# Patient Record
Sex: MALE | Race: WHITE
[De-identification: names, ages, dates, MRNs, and addresses within clinical notes are randomized per-mention and may not be internally consistent; named-entity substitution may affect disease eponyms.]

---

## 2019-03-12 ENCOUNTER — HOSPITAL ENCOUNTER (OUTPATIENT)
Age: 44
End: 2019-03-12
Payer: MEDICARE

## 2019-03-12 DIAGNOSIS — K74.60: ICD-10-CM

## 2019-03-12 DIAGNOSIS — R76.8: ICD-10-CM

## 2019-03-12 DIAGNOSIS — I10: Primary | ICD-10-CM

## 2019-03-12 DIAGNOSIS — E55.9: ICD-10-CM

## 2019-03-12 DIAGNOSIS — R53.83: ICD-10-CM

## 2019-03-12 DIAGNOSIS — R94.5: ICD-10-CM

## 2019-03-12 LAB
ALBUMIN LEVEL: 3.3 GM/DL (ref 3.4–5)
ALBUMIN/GLOB SERPL: 0.9 {RATIO} (ref 1.1–1.8)
ALP ISO SERPL-ACNC: 172 U/L (ref 46–116)
ALT SERPLBLD-CCNC: 36 U/L (ref 12–78)
ANION GAP SERPL CALC-SCNC: 14.1 MEQ/L (ref 5–15)
AST SERPL QL: 54 U/L (ref 15–37)
BILIRUBIN,TOTAL: 2 MG/DL (ref 0.2–1)
BUN SERPL-MCNC: 9 MG/DL (ref 7–18)
CALCIUM SPEC-MCNC: 8.5 MG/DL (ref 8.5–10.1)
CHLORIDE SPEC-SCNC: 104 MMOL/L (ref 98–107)
CHOLEST SPEC-SCNC: 131 MG/DL (ref 140–200)
CO2 SERPL-SCNC: 25 MMOL/L (ref 21–32)
CREAT BLD-SCNC: 0.67 MG/DL (ref 0.7–1.3)
ESTIMATED GLOMERULAR FILT RATE: 128 ML/MIN (ref 60–?)
GFR (AFRICAN AMERICAN): 155 ML/MIN (ref 60–?)
GLOBULIN SER CALC-MCNC: 3.7 GM/DL (ref 1.3–3.2)
GLUCOSE: 126 MG/DL (ref 74–106)
HCT VFR BLD CALC: 37.8 % (ref 42–52)
HDLC SERPL-MCNC: 89 MG/DL (ref 27–67)
HGB BLD-MCNC: 13.6 G/DL (ref 14.1–18)
MCHC RBC-ENTMCNC: 36.1 G/DL (ref 31.8–35.4)
MCV RBC: 94 FL (ref 80–94)
MEAN CORPUSCULAR HEMOGLOBIN: 33.9 PG (ref 27–31.2)
PLATELET # BLD: 66 K/MM3 (ref 142–424)
POTASSIUM: 3.1 MMOL/L (ref 3.5–5.1)
PROT SERPL-MCNC: 7 GM/DL (ref 6.4–8.2)
RBC # BLD AUTO: 4.02 M/MM3 (ref 4.6–6.2)
SODIUM SPEC-SCNC: 140 MMOL/L (ref 136–145)
T4 (THYROXINE): 6.6 UG/DL (ref 4.7–13.3)
T4 FREE SERPL-MCNC: 0.9 NG/DL (ref 0.76–1.46)
TRIGLYCERIDES: 26 MG/DL (ref 30–200)
TSH SERPL-ACNC: 7.12 UIU/ML (ref 0.36–3.74)
WBC # BLD AUTO: 3.4 K/MM3 (ref 4.8–10.8)

## 2019-03-12 PROCEDURE — 84439 ASSAY OF FREE THYROXINE: CPT

## 2019-03-12 PROCEDURE — 85025 COMPLETE CBC W/AUTO DIFF WBC: CPT

## 2019-03-12 PROCEDURE — 84443 ASSAY THYROID STIM HORMONE: CPT

## 2019-03-12 PROCEDURE — 82652 VIT D 1 25-DIHYDROXY: CPT

## 2019-03-12 PROCEDURE — 80053 COMPREHEN METABOLIC PANEL: CPT

## 2019-03-12 PROCEDURE — 80074 ACUTE HEPATITIS PANEL: CPT

## 2019-03-12 PROCEDURE — 84436 ASSAY OF TOTAL THYROXINE: CPT

## 2019-03-12 PROCEDURE — 87522 HEPATITIS C REVRS TRNSCRPJ: CPT

## 2019-03-12 PROCEDURE — 80061 LIPID PANEL: CPT

## 2019-03-13 LAB — HCV AB SER IA-ACNC: 5.2 S/CO RATIO (ref 0–0.9)

## 2019-03-18 LAB
1,25-DIHYDROXY, VITAMIN D-2: <10 PG/ML
1,25-DIHYDROXY, VITAMIN D-3: 37 PG/ML
VIT D25+D1,25 OH+D1,25 PNL SERPL-MCNC: 37 PG/ML

## 2020-06-05 ENCOUNTER — HOSPITAL ENCOUNTER (INPATIENT)
Dept: HOSPITAL 22 - ER | Age: 45
LOS: 4 days | Discharge: HOME | DRG: 603 | End: 2020-06-09
Payer: MEDICARE

## 2020-06-05 VITALS
BODY MASS INDEX: 39.4 KG/M2 | BODY MASS INDEX: 39.9 KG/M2 | BODY MASS INDEX: 40.1 KG/M2 | BODY MASS INDEX: 40 KG/M2 | BODY MASS INDEX: 41.3 KG/M2

## 2020-06-05 VITALS
DIASTOLIC BLOOD PRESSURE: 104 MMHG | OXYGEN SATURATION: 99 % | RESPIRATION RATE: 20 BRPM | HEART RATE: 93 BPM | TEMPERATURE: 98.24 F | SYSTOLIC BLOOD PRESSURE: 163 MMHG

## 2020-06-05 VITALS
RESPIRATION RATE: 18 BRPM | SYSTOLIC BLOOD PRESSURE: 144 MMHG | OXYGEN SATURATION: 98 % | HEART RATE: 100 BPM | DIASTOLIC BLOOD PRESSURE: 77 MMHG | TEMPERATURE: 100 F

## 2020-06-05 VITALS — DIASTOLIC BLOOD PRESSURE: 88 MMHG | SYSTOLIC BLOOD PRESSURE: 144 MMHG | HEART RATE: 88 BPM

## 2020-06-05 VITALS
SYSTOLIC BLOOD PRESSURE: 137 MMHG | OXYGEN SATURATION: 98 % | DIASTOLIC BLOOD PRESSURE: 64 MMHG | RESPIRATION RATE: 20 BRPM | HEART RATE: 79 BPM | TEMPERATURE: 99.14 F

## 2020-06-05 VITALS
HEART RATE: 85 BPM | DIASTOLIC BLOOD PRESSURE: 86 MMHG | OXYGEN SATURATION: 97 % | RESPIRATION RATE: 20 BRPM | SYSTOLIC BLOOD PRESSURE: 152 MMHG

## 2020-06-05 VITALS
RESPIRATION RATE: 16 BRPM | TEMPERATURE: 97.88 F | SYSTOLIC BLOOD PRESSURE: 155 MMHG | HEART RATE: 78 BPM | DIASTOLIC BLOOD PRESSURE: 74 MMHG | OXYGEN SATURATION: 98 %

## 2020-06-05 VITALS — HEART RATE: 89 BPM | SYSTOLIC BLOOD PRESSURE: 130 MMHG | DIASTOLIC BLOOD PRESSURE: 80 MMHG

## 2020-06-05 DIAGNOSIS — Z72.0: ICD-10-CM

## 2020-06-05 DIAGNOSIS — L03.115: Primary | ICD-10-CM

## 2020-06-05 DIAGNOSIS — E87.1: ICD-10-CM

## 2020-06-05 DIAGNOSIS — I10: ICD-10-CM

## 2020-06-05 DIAGNOSIS — Z88.8: ICD-10-CM

## 2020-06-05 DIAGNOSIS — D61.818: ICD-10-CM

## 2020-06-05 LAB
ALBUMIN LEVEL: 3.1 G/DL (ref 3.5–5)
ALBUMIN/GLOB SERPL: 0.6 {RATIO} (ref 1.1–1.8)
ALP ISO SERPL-ACNC: 225 U/L (ref 38–126)
ALT SERPLBLD-CCNC: 47 U/L (ref 12–78)
ANION GAP SERPL CALC-SCNC: -13.4 MEQ/L (ref 5–15)
AST SERPL QL: 103 U/L (ref 17–59)
BILIRUBIN,TOTAL: 2.6 MG/DL (ref 0.2–1.3)
BUN SERPL-MCNC: 7 MG/DL (ref 9–20)
CALCIUM SPEC-MCNC: 8 MG/DL (ref 8.4–10.2)
CHLORIDE SPEC-SCNC: 108 MMOL/L (ref 98–107)
CO2 SERPL-SCNC: 27 MMOL/L (ref 22–30)
CREAT BLD-SCNC: 0.6 MG/DL (ref 0.66–1.25)
CREATININE CLEARANCE ESTIMATED: 154 ML/MIN (ref 50–200)
CRP SERPL HS-MCNC: 40.9 MG/L (ref 0–4)
ESTIMATED GLOMERULAR FILT RATE: 145 ML/MIN (ref 60–?)
GFR (AFRICAN AMERICAN): 176 ML/MIN (ref 60–?)
GLOBULIN SER CALC-MCNC: 5.2 G/DL (ref 1.3–3.2)
GLUCOSE: 92 MG/DL (ref 74–100)
HCT VFR BLD CALC: 34.3 % (ref 42–52)
HGB BLD-MCNC: 11.2 G/DL (ref 14.1–18)
MCHC RBC-ENTMCNC: 32.6 G/DL (ref 31.8–35.4)
MCV RBC: 101.7 FL (ref 80–94)
MEAN CORPUSCULAR HEMOGLOBIN: 33.1 PG (ref 27–31.2)
PLATELET # BLD: 87 K/MM3 (ref 142–424)
POTASSIUM: 3.6 MMOL/L (ref 3.5–5.1)
PROT SERPL-MCNC: 8.3 G/DL (ref 6.3–8.2)
RBC # BLD AUTO: 3.37 M/MM3 (ref 4.6–6.2)
SODIUM SPEC-SCNC: 118 MMOL/L (ref 136–145)
WBC # BLD AUTO: 2.9 K/MM3 (ref 4.8–10.8)

## 2020-06-05 PROCEDURE — 83605 ASSAY OF LACTIC ACID: CPT

## 2020-06-05 PROCEDURE — 80202 ASSAY OF VANCOMYCIN: CPT

## 2020-06-05 PROCEDURE — 80076 HEPATIC FUNCTION PANEL: CPT

## 2020-06-05 PROCEDURE — 96367 TX/PROPH/DG ADDL SEQ IV INF: CPT

## 2020-06-05 PROCEDURE — 80048 BASIC METABOLIC PNL TOTAL CA: CPT

## 2020-06-05 PROCEDURE — 96375 TX/PRO/DX INJ NEW DRUG ADDON: CPT

## 2020-06-05 PROCEDURE — 99284 EMERGENCY DEPT VISIT MOD MDM: CPT

## 2020-06-05 PROCEDURE — 73590 X-RAY EXAM OF LOWER LEG: CPT

## 2020-06-05 PROCEDURE — 87040 BLOOD CULTURE FOR BACTERIA: CPT

## 2020-06-05 PROCEDURE — 96365 THER/PROPH/DIAG IV INF INIT: CPT

## 2020-06-05 PROCEDURE — 73701 CT LOWER EXTREMITY W/DYE: CPT

## 2020-06-05 PROCEDURE — 86140 C-REACTIVE PROTEIN: CPT

## 2020-06-05 PROCEDURE — 82962 GLUCOSE BLOOD TEST: CPT

## 2020-06-05 PROCEDURE — 85025 COMPLETE CBC W/AUTO DIFF WBC: CPT

## 2020-06-05 PROCEDURE — 80053 COMPREHEN METABOLIC PANEL: CPT

## 2020-06-05 PROCEDURE — 36415 COLL VENOUS BLD VENIPUNCTURE: CPT

## 2020-06-05 PROCEDURE — 93970 EXTREMITY STUDY: CPT

## 2020-06-06 VITALS
HEART RATE: 78 BPM | SYSTOLIC BLOOD PRESSURE: 146 MMHG | RESPIRATION RATE: 18 BRPM | DIASTOLIC BLOOD PRESSURE: 74 MMHG | TEMPERATURE: 98.3 F | OXYGEN SATURATION: 97 %

## 2020-06-06 VITALS
HEART RATE: 83 BPM | DIASTOLIC BLOOD PRESSURE: 84 MMHG | SYSTOLIC BLOOD PRESSURE: 155 MMHG | OXYGEN SATURATION: 95 % | TEMPERATURE: 98.42 F | RESPIRATION RATE: 20 BRPM

## 2020-06-06 VITALS
HEART RATE: 76 BPM | RESPIRATION RATE: 20 BRPM | OXYGEN SATURATION: 99 % | DIASTOLIC BLOOD PRESSURE: 79 MMHG | TEMPERATURE: 97.88 F | SYSTOLIC BLOOD PRESSURE: 112 MMHG

## 2020-06-06 VITALS
TEMPERATURE: 98.7 F | DIASTOLIC BLOOD PRESSURE: 73 MMHG | OXYGEN SATURATION: 96 % | SYSTOLIC BLOOD PRESSURE: 140 MMHG | RESPIRATION RATE: 20 BRPM | HEART RATE: 78 BPM

## 2020-06-06 VITALS — OXYGEN SATURATION: 99 %

## 2020-06-06 LAB
ANION GAP SERPL CALC-SCNC: 5.1 MEQ/L (ref 5–15)
BUN SERPL-MCNC: 8 MG/DL (ref 9–20)
CALCIUM SPEC-MCNC: 7.8 MG/DL (ref 8.4–10.2)
CHLORIDE SPEC-SCNC: 106 MMOL/L (ref 98–107)
CO2 SERPL-SCNC: 29 MMOL/L (ref 22–30)
CREAT BLD-SCNC: 0.7 MG/DL (ref 0.66–1.25)
CREATININE CLEARANCE ESTIMATED: 128 ML/MIN (ref 50–200)
ESTIMATED GLOMERULAR FILT RATE: 121 ML/MIN (ref 60–?)
GFR (AFRICAN AMERICAN): 147 ML/MIN (ref 60–?)
GLUCOSE: 150 MG/DL (ref 74–100)
HCT VFR BLD CALC: 31.9 % (ref 42–52)
HGB BLD-MCNC: 10.4 G/DL (ref 14.1–18)
MCHC RBC-ENTMCNC: 32.5 G/DL (ref 31.8–35.4)
MCV RBC: 104.7 FL (ref 80–94)
MEAN CORPUSCULAR HEMOGLOBIN: 34.1 PG (ref 27–31.2)
PLATELET # BLD: 93 K/MM3 (ref 142–424)
POTASSIUM: 4.1 MMOL/L (ref 3.5–5.1)
RBC # BLD AUTO: 3.05 M/MM3 (ref 4.6–6.2)
SODIUM SPEC-SCNC: 136 MMOL/L (ref 136–145)
VANCOMYCIN TROUGH SERPL-MCNC: 12.9 UG/ML (ref 5–10)
WBC # BLD AUTO: 2.4 K/MM3 (ref 4.8–10.8)

## 2020-06-07 VITALS
HEART RATE: 81 BPM | TEMPERATURE: 98.42 F | OXYGEN SATURATION: 95 % | DIASTOLIC BLOOD PRESSURE: 58 MMHG | SYSTOLIC BLOOD PRESSURE: 133 MMHG | RESPIRATION RATE: 17 BRPM

## 2020-06-07 VITALS
TEMPERATURE: 98.1 F | HEART RATE: 71 BPM | DIASTOLIC BLOOD PRESSURE: 62 MMHG | RESPIRATION RATE: 17 BRPM | SYSTOLIC BLOOD PRESSURE: 130 MMHG | OXYGEN SATURATION: 99 %

## 2020-06-07 VITALS
HEART RATE: 82 BPM | TEMPERATURE: 98 F | SYSTOLIC BLOOD PRESSURE: 163 MMHG | RESPIRATION RATE: 18 BRPM | OXYGEN SATURATION: 96 % | DIASTOLIC BLOOD PRESSURE: 85 MMHG

## 2020-06-07 VITALS
TEMPERATURE: 98.42 F | OXYGEN SATURATION: 99 % | DIASTOLIC BLOOD PRESSURE: 76 MMHG | HEART RATE: 78 BPM | SYSTOLIC BLOOD PRESSURE: 161 MMHG | RESPIRATION RATE: 17 BRPM

## 2020-06-07 VITALS — OXYGEN SATURATION: 95 %

## 2020-06-07 LAB
ALBUMIN LEVEL: 2.5 G/DL (ref 3.5–5)
ALP ISO SERPL-ACNC: 208 U/L (ref 38–126)
ALT SERPLBLD-CCNC: 35 U/L (ref 12–78)
AST SERPL QL: 75 U/L (ref 17–59)
BILIRUB DIRECT SERPL-MCNC: 0.5 MG/DL (ref 0–0.4)
BILIRUB INDIRECT SERPL-MCNC: 0.7 MG/DL (ref 0–0.9)
BILIRUB INDIRECT SERPL-MCNC: 0.7 MG/DL (ref 0–1.1)
BILIRUBIN,TOTAL: 1.2 MG/DL (ref 0.2–1.3)
HCT VFR BLD CALC: 31.6 % (ref 42–52)
HGB BLD-MCNC: 10.5 G/DL (ref 14.1–18)
MCHC RBC-ENTMCNC: 33.3 G/DL (ref 31.8–35.4)
MCV RBC: 102 FL (ref 80–94)
MEAN CORPUSCULAR HEMOGLOBIN: 34 PG (ref 27–31.2)
PLATELET # BLD: 98 K/MM3 (ref 142–424)
PROT SERPL-MCNC: 7.1 G/DL (ref 6.3–8.2)
RBC # BLD AUTO: 3.1 M/MM3 (ref 4.6–6.2)
WBC # BLD AUTO: 2.2 K/MM3 (ref 4.8–10.8)

## 2020-06-08 VITALS
TEMPERATURE: 98.3 F | OXYGEN SATURATION: 95 % | RESPIRATION RATE: 18 BRPM | SYSTOLIC BLOOD PRESSURE: 157 MMHG | HEART RATE: 81 BPM | DIASTOLIC BLOOD PRESSURE: 92 MMHG

## 2020-06-08 VITALS
TEMPERATURE: 98.42 F | HEART RATE: 78 BPM | RESPIRATION RATE: 14 BRPM | OXYGEN SATURATION: 96 % | SYSTOLIC BLOOD PRESSURE: 143 MMHG | DIASTOLIC BLOOD PRESSURE: 74 MMHG

## 2020-06-08 VITALS
TEMPERATURE: 97.8 F | HEART RATE: 103 BPM | OXYGEN SATURATION: 95 % | SYSTOLIC BLOOD PRESSURE: 153 MMHG | RESPIRATION RATE: 18 BRPM | DIASTOLIC BLOOD PRESSURE: 83 MMHG

## 2020-06-08 VITALS
OXYGEN SATURATION: 97 % | SYSTOLIC BLOOD PRESSURE: 163 MMHG | HEART RATE: 87 BPM | TEMPERATURE: 98.1 F | RESPIRATION RATE: 18 BRPM | DIASTOLIC BLOOD PRESSURE: 96 MMHG

## 2020-06-08 LAB
ANION GAP SERPL CALC-SCNC: 8.6 MEQ/L (ref 5–15)
BUN SERPL-MCNC: 7 MG/DL (ref 9–20)
CALCIUM SPEC-MCNC: 8.4 MG/DL (ref 8.4–10.2)
CHLORIDE SPEC-SCNC: 98 MMOL/L (ref 98–107)
CO2 SERPL-SCNC: 33 MMOL/L (ref 22–30)
CREAT BLD-SCNC: 0.6 MG/DL (ref 0.66–1.25)
CREATININE CLEARANCE ESTIMATED: 263 ML/MIN (ref 50–200)
ESTIMATED GLOMERULAR FILT RATE: 145 ML/MIN (ref 60–?)
GFR (AFRICAN AMERICAN): 176 ML/MIN (ref 60–?)
GLUCOSE BLDC GLUCOMTR-MCNC: 101 MG/DL (ref 70–110)
GLUCOSE: 190 MG/DL (ref 74–100)
HCT VFR BLD CALC: 32.7 % (ref 42–52)
HGB BLD-MCNC: 11.1 G/DL (ref 14.1–18)
MCHC RBC-ENTMCNC: 34 G/DL (ref 31.8–35.4)
MCV RBC: 101.7 FL (ref 80–94)
MEAN CORPUSCULAR HEMOGLOBIN: 34.6 PG (ref 27–31.2)
PLATELET # BLD: 124 K/MM3 (ref 142–424)
POTASSIUM: 3.6 MMOL/L (ref 3.5–5.1)
RBC # BLD AUTO: 3.22 M/MM3 (ref 4.6–6.2)
SODIUM SPEC-SCNC: 136 MMOL/L (ref 136–145)
WBC # BLD AUTO: 2.6 K/MM3 (ref 4.8–10.8)

## 2020-06-09 VITALS
SYSTOLIC BLOOD PRESSURE: 168 MMHG | DIASTOLIC BLOOD PRESSURE: 87 MMHG | OXYGEN SATURATION: 99 % | RESPIRATION RATE: 16 BRPM | HEART RATE: 100 BPM | TEMPERATURE: 98.06 F

## 2020-06-09 VITALS
RESPIRATION RATE: 19 BRPM | OXYGEN SATURATION: 96 % | SYSTOLIC BLOOD PRESSURE: 150 MMHG | TEMPERATURE: 97.8 F | DIASTOLIC BLOOD PRESSURE: 82 MMHG | HEART RATE: 98 BPM

## 2020-06-09 VITALS — RESPIRATION RATE: 18 BRPM

## 2020-06-09 VITALS — RESPIRATION RATE: 20 BRPM

## 2020-06-09 LAB
ANION GAP SERPL CALC-SCNC: 2.6 MEQ/L (ref 5–15)
BUN SERPL-MCNC: 8 MG/DL (ref 9–20)
CALCIUM SPEC-MCNC: 7.9 MG/DL (ref 8.4–10.2)
CHLORIDE SPEC-SCNC: 104 MMOL/L (ref 98–107)
CO2 SERPL-SCNC: 32 MMOL/L (ref 22–30)
CREAT BLD-SCNC: 0.6 MG/DL (ref 0.66–1.25)
CREATININE CLEARANCE ESTIMATED: 263 ML/MIN (ref 50–200)
ESTIMATED GLOMERULAR FILT RATE: 145 ML/MIN (ref 60–?)
GFR (AFRICAN AMERICAN): 176 ML/MIN (ref 60–?)
GLUCOSE: 194 MG/DL (ref 74–100)
HCT VFR BLD CALC: 32.6 % (ref 42–52)
HGB BLD-MCNC: 10.8 G/DL (ref 14.1–18)
MCHC RBC-ENTMCNC: 33.1 G/DL (ref 31.8–35.4)
MCV RBC: 101.7 FL (ref 80–94)
MEAN CORPUSCULAR HEMOGLOBIN: 33.6 PG (ref 27–31.2)
PLATELET # BLD: 109 K/MM3 (ref 142–424)
POTASSIUM: 3.6 MMOL/L (ref 3.5–5.1)
RBC # BLD AUTO: 3.2 M/MM3 (ref 4.6–6.2)
SODIUM SPEC-SCNC: 135 MMOL/L (ref 136–145)
WBC # BLD AUTO: 2.5 K/MM3 (ref 4.8–10.8)

## 2020-07-22 ENCOUNTER — HOSPITAL ENCOUNTER (INPATIENT)
Dept: HOSPITAL 22 - ER | Age: 45
LOS: 3 days | Discharge: HOME | DRG: 571 | End: 2020-07-25
Payer: MEDICARE

## 2020-07-22 VITALS
HEART RATE: 77 BPM | DIASTOLIC BLOOD PRESSURE: 61 MMHG | RESPIRATION RATE: 18 BRPM | OXYGEN SATURATION: 100 % | SYSTOLIC BLOOD PRESSURE: 136 MMHG

## 2020-07-22 VITALS
RESPIRATION RATE: 18 BRPM | SYSTOLIC BLOOD PRESSURE: 172 MMHG | OXYGEN SATURATION: 99 % | DIASTOLIC BLOOD PRESSURE: 90 MMHG | HEART RATE: 75 BPM

## 2020-07-22 VITALS
DIASTOLIC BLOOD PRESSURE: 64 MMHG | SYSTOLIC BLOOD PRESSURE: 123 MMHG | HEART RATE: 84 BPM | OXYGEN SATURATION: 99 % | RESPIRATION RATE: 16 BRPM

## 2020-07-22 VITALS — SYSTOLIC BLOOD PRESSURE: 133 MMHG | DIASTOLIC BLOOD PRESSURE: 59 MMHG | HEART RATE: 85 BPM | OXYGEN SATURATION: 98 %

## 2020-07-22 VITALS
RESPIRATION RATE: 16 BRPM | OXYGEN SATURATION: 98 % | DIASTOLIC BLOOD PRESSURE: 64 MMHG | HEART RATE: 84 BPM | SYSTOLIC BLOOD PRESSURE: 127 MMHG

## 2020-07-22 VITALS
OXYGEN SATURATION: 99 % | TEMPERATURE: 98.78 F | SYSTOLIC BLOOD PRESSURE: 159 MMHG | HEART RATE: 87 BPM | RESPIRATION RATE: 18 BRPM | DIASTOLIC BLOOD PRESSURE: 87 MMHG

## 2020-07-22 VITALS
HEART RATE: 84 BPM | DIASTOLIC BLOOD PRESSURE: 91 MMHG | RESPIRATION RATE: 18 BRPM | SYSTOLIC BLOOD PRESSURE: 131 MMHG | OXYGEN SATURATION: 98 %

## 2020-07-22 VITALS
RESPIRATION RATE: 20 BRPM | OXYGEN SATURATION: 100 % | SYSTOLIC BLOOD PRESSURE: 171 MMHG | TEMPERATURE: 98.06 F | HEART RATE: 79 BPM | DIASTOLIC BLOOD PRESSURE: 89 MMHG

## 2020-07-22 VITALS
DIASTOLIC BLOOD PRESSURE: 81 MMHG | OXYGEN SATURATION: 100 % | RESPIRATION RATE: 16 BRPM | SYSTOLIC BLOOD PRESSURE: 137 MMHG | TEMPERATURE: 98.7 F | HEART RATE: 72 BPM

## 2020-07-22 VITALS
BODY MASS INDEX: 40.1 KG/M2 | BODY MASS INDEX: 41.8 KG/M2 | BODY MASS INDEX: 40.7 KG/M2 | BODY MASS INDEX: 40.8 KG/M2 | BODY MASS INDEX: 38.4 KG/M2

## 2020-07-22 DIAGNOSIS — L03.115: Primary | ICD-10-CM

## 2020-07-22 DIAGNOSIS — Z72.0: ICD-10-CM

## 2020-07-22 DIAGNOSIS — Z88.8: ICD-10-CM

## 2020-07-22 DIAGNOSIS — L02.611: ICD-10-CM

## 2020-07-22 DIAGNOSIS — L03.116: ICD-10-CM

## 2020-07-22 DIAGNOSIS — R65.10: ICD-10-CM

## 2020-07-22 DIAGNOSIS — Z16.24: ICD-10-CM

## 2020-07-22 DIAGNOSIS — E66.01: ICD-10-CM

## 2020-07-22 DIAGNOSIS — I89.0: ICD-10-CM

## 2020-07-22 DIAGNOSIS — L60.3: ICD-10-CM

## 2020-07-22 DIAGNOSIS — L97.511: ICD-10-CM

## 2020-07-22 LAB
ALBUMIN LEVEL: 3.1 G/DL (ref 3.5–5)
ALBUMIN/GLOB SERPL: 0.7 {RATIO} (ref 1.1–1.8)
ALP ISO SERPL-ACNC: 186 U/L (ref 38–126)
ALT SERPLBLD-CCNC: 43 U/L (ref 12–78)
ANION GAP SERPL CALC-SCNC: 4.5 MEQ/L (ref 5–15)
AST SERPL QL: 75 U/L (ref 17–59)
BILIRUBIN,TOTAL: 2.7 MG/DL (ref 0.2–1.3)
BUN SERPL-MCNC: 9 MG/DL (ref 9–20)
CALCIUM SPEC-MCNC: 8.5 MG/DL (ref 8.4–10.2)
CHLORIDE SPEC-SCNC: 99 MMOL/L (ref 98–107)
CO2 SERPL-SCNC: 33 MMOL/L (ref 22–30)
CREAT BLD-SCNC: 0.5 MG/DL (ref 0.66–1.25)
CREATININE CLEARANCE ESTIMATED: 308 ML/MIN (ref 50–200)
CRP SERPL HS-MCNC: 53.6 MG/L (ref 0–4)
ESTIMATED GLOMERULAR FILT RATE: 179 ML/MIN (ref 60–?)
GFR (AFRICAN AMERICAN): 217 ML/MIN (ref 60–?)
GLOBULIN SER CALC-MCNC: 4.3 G/DL (ref 1.3–3.2)
GLUCOSE: 95 MG/DL (ref 74–100)
HCT VFR BLD CALC: 36.5 % (ref 42–52)
HGB BLD-MCNC: 12.3 G/DL (ref 14.1–18)
MCHC RBC-ENTMCNC: 33.8 G/DL (ref 31.8–35.4)
MCV RBC: 100.1 FL (ref 80–94)
MEAN CORPUSCULAR HEMOGLOBIN: 33.8 PG (ref 27–31.2)
PLATELET # BLD: 96 K/MM3 (ref 142–424)
POTASSIUM: 3.5 MMOL/L (ref 3.5–5.1)
PROT SERPL-MCNC: 7.4 G/DL (ref 6.3–8.2)
RBC # BLD AUTO: 3.64 M/MM3 (ref 4.6–6.2)
SODIUM SPEC-SCNC: 133 MMOL/L (ref 136–145)
WBC # BLD AUTO: 4.8 K/MM3 (ref 4.8–10.8)

## 2020-07-22 PROCEDURE — 80074 ACUTE HEPATITIS PANEL: CPT

## 2020-07-22 PROCEDURE — 87070 CULTURE OTHR SPECIMN AEROBIC: CPT

## 2020-07-22 PROCEDURE — 94640 AIRWAY INHALATION TREATMENT: CPT

## 2020-07-22 PROCEDURE — 97162 PT EVAL MOD COMPLEX 30 MIN: CPT

## 2020-07-22 PROCEDURE — 76705 ECHO EXAM OF ABDOMEN: CPT

## 2020-07-22 PROCEDURE — 87205 SMEAR GRAM STAIN: CPT

## 2020-07-22 PROCEDURE — 73590 X-RAY EXAM OF LOWER LEG: CPT

## 2020-07-22 PROCEDURE — 73630 X-RAY EXAM OF FOOT: CPT

## 2020-07-22 PROCEDURE — 93005 ELECTROCARDIOGRAM TRACING: CPT

## 2020-07-22 PROCEDURE — 86328 IA NFCT AB SARSCOV2 COVID19: CPT

## 2020-07-22 PROCEDURE — 87040 BLOOD CULTURE FOR BACTERIA: CPT

## 2020-07-22 PROCEDURE — 87186 SC STD MICRODIL/AGAR DIL: CPT

## 2020-07-22 PROCEDURE — 87077 CULTURE AEROBIC IDENTIFY: CPT

## 2020-07-22 PROCEDURE — 83605 ASSAY OF LACTIC ACID: CPT

## 2020-07-22 PROCEDURE — 85025 COMPLETE CBC W/AUTO DIFF WBC: CPT

## 2020-07-22 PROCEDURE — 96365 THER/PROPH/DIAG IV INF INIT: CPT

## 2020-07-22 PROCEDURE — 80076 HEPATIC FUNCTION PANEL: CPT

## 2020-07-22 PROCEDURE — 83036 HEMOGLOBIN GLYCOSYLATED A1C: CPT

## 2020-07-22 PROCEDURE — 80053 COMPREHEN METABOLIC PANEL: CPT

## 2020-07-22 PROCEDURE — 85651 RBC SED RATE NONAUTOMATED: CPT

## 2020-07-22 PROCEDURE — 94761 N-INVAS EAR/PLS OXIMETRY MLT: CPT

## 2020-07-22 PROCEDURE — 11042 DBRDMT SUBQ TIS 1ST 20SQCM/<: CPT

## 2020-07-22 PROCEDURE — 96367 TX/PROPH/DG ADDL SEQ IV INF: CPT

## 2020-07-22 PROCEDURE — 86140 C-REACTIVE PROTEIN: CPT

## 2020-07-22 PROCEDURE — 93306 TTE W/DOPPLER COMPLETE: CPT

## 2020-07-22 PROCEDURE — 71046 X-RAY EXAM CHEST 2 VIEWS: CPT

## 2020-07-22 PROCEDURE — 87075 CULTR BACTERIA EXCEPT BLOOD: CPT

## 2020-07-22 PROCEDURE — 36415 COLL VENOUS BLD VENIPUNCTURE: CPT

## 2020-07-22 PROCEDURE — 99284 EMERGENCY DEPT VISIT MOD MDM: CPT

## 2020-07-22 PROCEDURE — 93923 UPR/LXTR ART STDY 3+ LVLS: CPT

## 2020-07-22 PROCEDURE — 85007 BL SMEAR W/DIFF WBC COUNT: CPT

## 2020-07-23 VITALS
HEART RATE: 78 BPM | OXYGEN SATURATION: 96 % | SYSTOLIC BLOOD PRESSURE: 133 MMHG | RESPIRATION RATE: 18 BRPM | DIASTOLIC BLOOD PRESSURE: 81 MMHG | TEMPERATURE: 98.2 F

## 2020-07-23 VITALS
TEMPERATURE: 98.3 F | SYSTOLIC BLOOD PRESSURE: 132 MMHG | RESPIRATION RATE: 18 BRPM | OXYGEN SATURATION: 99 % | HEART RATE: 85 BPM | DIASTOLIC BLOOD PRESSURE: 71 MMHG

## 2020-07-23 VITALS
SYSTOLIC BLOOD PRESSURE: 128 MMHG | TEMPERATURE: 97.88 F | RESPIRATION RATE: 18 BRPM | DIASTOLIC BLOOD PRESSURE: 73 MMHG | HEART RATE: 79 BPM | OXYGEN SATURATION: 93 %

## 2020-07-23 VITALS
OXYGEN SATURATION: 96 % | HEART RATE: 84 BPM | SYSTOLIC BLOOD PRESSURE: 147 MMHG | TEMPERATURE: 97.88 F | DIASTOLIC BLOOD PRESSURE: 80 MMHG | RESPIRATION RATE: 16 BRPM

## 2020-07-23 VITALS
OXYGEN SATURATION: 92 % | DIASTOLIC BLOOD PRESSURE: 88 MMHG | SYSTOLIC BLOOD PRESSURE: 148 MMHG | RESPIRATION RATE: 16 BRPM | HEART RATE: 78 BPM

## 2020-07-23 VITALS
HEART RATE: 78 BPM | SYSTOLIC BLOOD PRESSURE: 146 MMHG | OXYGEN SATURATION: 97 % | TEMPERATURE: 98 F | RESPIRATION RATE: 16 BRPM | DIASTOLIC BLOOD PRESSURE: 77 MMHG

## 2020-07-23 VITALS
SYSTOLIC BLOOD PRESSURE: 162 MMHG | DIASTOLIC BLOOD PRESSURE: 89 MMHG | OXYGEN SATURATION: 97 % | TEMPERATURE: 98.06 F | HEART RATE: 96 BPM | RESPIRATION RATE: 16 BRPM

## 2020-07-23 VITALS
HEART RATE: 85 BPM | DIASTOLIC BLOOD PRESSURE: 82 MMHG | OXYGEN SATURATION: 95 % | SYSTOLIC BLOOD PRESSURE: 143 MMHG | RESPIRATION RATE: 16 BRPM | TEMPERATURE: 97.7 F

## 2020-07-23 VITALS
HEART RATE: 77 BPM | SYSTOLIC BLOOD PRESSURE: 146 MMHG | RESPIRATION RATE: 18 BRPM | DIASTOLIC BLOOD PRESSURE: 76 MMHG | OXYGEN SATURATION: 96 % | TEMPERATURE: 98.24 F

## 2020-07-23 VITALS
HEART RATE: 82 BPM | SYSTOLIC BLOOD PRESSURE: 155 MMHG | TEMPERATURE: 98.1 F | RESPIRATION RATE: 16 BRPM | OXYGEN SATURATION: 96 % | DIASTOLIC BLOOD PRESSURE: 76 MMHG

## 2020-07-23 VITALS — OXYGEN SATURATION: 97 % | HEART RATE: 83 BPM

## 2020-07-23 VITALS
DIASTOLIC BLOOD PRESSURE: 77 MMHG | TEMPERATURE: 97.88 F | SYSTOLIC BLOOD PRESSURE: 128 MMHG | RESPIRATION RATE: 16 BRPM | OXYGEN SATURATION: 96 % | HEART RATE: 83 BPM

## 2020-07-23 VITALS
DIASTOLIC BLOOD PRESSURE: 95 MMHG | OXYGEN SATURATION: 94 % | RESPIRATION RATE: 16 BRPM | HEART RATE: 76 BPM | SYSTOLIC BLOOD PRESSURE: 144 MMHG

## 2020-07-23 VITALS — RESPIRATION RATE: 16 BRPM | DIASTOLIC BLOOD PRESSURE: 82 MMHG | HEART RATE: 76 BPM | SYSTOLIC BLOOD PRESSURE: 156 MMHG

## 2020-07-23 VITALS
TEMPERATURE: 97.7 F | RESPIRATION RATE: 16 BRPM | DIASTOLIC BLOOD PRESSURE: 82 MMHG | HEART RATE: 85 BPM | SYSTOLIC BLOOD PRESSURE: 143 MMHG | OXYGEN SATURATION: 95 %

## 2020-07-23 VITALS
RESPIRATION RATE: 16 BRPM | OXYGEN SATURATION: 95 % | DIASTOLIC BLOOD PRESSURE: 83 MMHG | HEART RATE: 84 BPM | SYSTOLIC BLOOD PRESSURE: 154 MMHG | TEMPERATURE: 98.06 F

## 2020-07-23 VITALS
RESPIRATION RATE: 16 BRPM | HEART RATE: 77 BPM | TEMPERATURE: 97.88 F | SYSTOLIC BLOOD PRESSURE: 136 MMHG | OXYGEN SATURATION: 99 % | DIASTOLIC BLOOD PRESSURE: 68 MMHG

## 2020-07-23 VITALS
TEMPERATURE: 98.24 F | OXYGEN SATURATION: 97 % | DIASTOLIC BLOOD PRESSURE: 69 MMHG | HEART RATE: 77 BPM | SYSTOLIC BLOOD PRESSURE: 136 MMHG | RESPIRATION RATE: 16 BRPM

## 2020-07-23 VITALS
OXYGEN SATURATION: 96 % | SYSTOLIC BLOOD PRESSURE: 164 MMHG | DIASTOLIC BLOOD PRESSURE: 81 MMHG | HEART RATE: 94 BPM | TEMPERATURE: 98.2 F | RESPIRATION RATE: 16 BRPM

## 2020-07-23 VITALS — SYSTOLIC BLOOD PRESSURE: 148 MMHG | DIASTOLIC BLOOD PRESSURE: 88 MMHG | TEMPERATURE: 97.7 F | HEART RATE: 78 BPM

## 2020-07-23 VITALS — HEART RATE: 77 BPM

## 2020-07-23 VITALS
DIASTOLIC BLOOD PRESSURE: 88 MMHG | OXYGEN SATURATION: 97 % | HEART RATE: 81 BPM | RESPIRATION RATE: 16 BRPM | SYSTOLIC BLOOD PRESSURE: 127 MMHG | TEMPERATURE: 98.06 F

## 2020-07-23 LAB — HBA1C MFR BLD: 4.3 % (ref 4–6)

## 2020-07-23 PROCEDURE — 0JBQ0ZZ EXCISION OF RIGHT FOOT SUBCUTANEOUS TISSUE AND FASCIA, OPEN APPROACH: ICD-10-PCS | Performed by: PODIATRIST

## 2020-07-24 VITALS
SYSTOLIC BLOOD PRESSURE: 154 MMHG | TEMPERATURE: 98.6 F | RESPIRATION RATE: 18 BRPM | HEART RATE: 77 BPM | DIASTOLIC BLOOD PRESSURE: 77 MMHG | OXYGEN SATURATION: 96 %

## 2020-07-24 VITALS
RESPIRATION RATE: 18 BRPM | OXYGEN SATURATION: 95 % | SYSTOLIC BLOOD PRESSURE: 130 MMHG | HEART RATE: 92 BPM | TEMPERATURE: 99 F | DIASTOLIC BLOOD PRESSURE: 64 MMHG

## 2020-07-24 VITALS
RESPIRATION RATE: 14 BRPM | TEMPERATURE: 98.5 F | HEART RATE: 91 BPM | DIASTOLIC BLOOD PRESSURE: 95 MMHG | SYSTOLIC BLOOD PRESSURE: 157 MMHG | OXYGEN SATURATION: 98 %

## 2020-07-24 VITALS — OXYGEN SATURATION: 97 %

## 2020-07-24 VITALS
DIASTOLIC BLOOD PRESSURE: 86 MMHG | RESPIRATION RATE: 18 BRPM | HEART RATE: 92 BPM | SYSTOLIC BLOOD PRESSURE: 161 MMHG | TEMPERATURE: 98.5 F | OXYGEN SATURATION: 96 %

## 2020-07-24 VITALS — HEART RATE: 86 BPM | OXYGEN SATURATION: 96 %

## 2020-07-24 VITALS
OXYGEN SATURATION: 97 % | TEMPERATURE: 97.9 F | DIASTOLIC BLOOD PRESSURE: 70 MMHG | SYSTOLIC BLOOD PRESSURE: 118 MMHG | HEART RATE: 81 BPM | RESPIRATION RATE: 14 BRPM

## 2020-07-24 VITALS
HEART RATE: 93 BPM | OXYGEN SATURATION: 96 % | SYSTOLIC BLOOD PRESSURE: 144 MMHG | RESPIRATION RATE: 18 BRPM | DIASTOLIC BLOOD PRESSURE: 94 MMHG | TEMPERATURE: 98.6 F

## 2020-07-24 VITALS — HEART RATE: 101 BPM | OXYGEN SATURATION: 96 %

## 2020-07-24 LAB
ALBUMIN LEVEL: 2.8 G/DL (ref 3.5–5)
ALBUMIN LEVEL: 2.8 G/DL (ref 3.5–5)
ALBUMIN/GLOB SERPL: 0.7 {RATIO} (ref 1.1–1.8)
ALP ISO SERPL-ACNC: 147 U/L (ref 38–126)
ALP ISO SERPL-ACNC: 148 U/L (ref 38–126)
ALT SERPLBLD-CCNC: 38 U/L (ref 12–78)
ALT SERPLBLD-CCNC: 39 U/L (ref 12–78)
ANION GAP SERPL CALC-SCNC: 8.3 MEQ/L (ref 5–15)
AST SERPL QL: 67 U/L (ref 17–59)
AST SERPL QL: 68 U/L (ref 17–59)
BILIRUB DIRECT SERPL-MCNC: 0.4 MG/DL (ref 0–0.4)
BILIRUB INDIRECT SERPL-MCNC: 1.4 MG/DL (ref 0–0.9)
BILIRUB INDIRECT SERPL-MCNC: 1.5 MG/DL (ref 0–1.1)
BILIRUBIN,TOTAL: 1.8 MG/DL (ref 0.2–1.3)
BILIRUBIN,TOTAL: 1.9 MG/DL (ref 0.2–1.3)
BUN SERPL-MCNC: 10 MG/DL (ref 9–20)
CALCIUM SPEC-MCNC: 8.1 MG/DL (ref 8.4–10.2)
CELLS COUNTED: 100
CHLORIDE SPEC-SCNC: 99 MMOL/L (ref 98–107)
CO2 SERPL-SCNC: 32 MMOL/L (ref 22–30)
CREAT BLD-SCNC: 0.6 MG/DL (ref 0.66–1.25)
CREATININE CLEARANCE ESTIMATED: 149 ML/MIN (ref 50–200)
CRP SERPL HS-MCNC: 37.8 MG/L (ref 0–4)
ESTIMATED GLOMERULAR FILT RATE: 145 ML/MIN (ref 60–?)
GFR (AFRICAN AMERICAN): 176 ML/MIN (ref 60–?)
GLOBULIN SER CALC-MCNC: 3.9 G/DL (ref 1.3–3.2)
GLUCOSE: 124 MG/DL (ref 74–100)
HCT VFR BLD CALC: 32.7 % (ref 42–52)
HGB BLD-MCNC: 11.1 G/DL (ref 14.1–18)
MANUAL DIFFERENTIAL: (no result)
MCHC RBC-ENTMCNC: 33.9 G/DL (ref 31.8–35.4)
MCV RBC: 98.7 FL (ref 80–94)
MEAN CORPUSCULAR HEMOGLOBIN: 33.4 PG (ref 27–31.2)
PLATELET # BLD: 94 K/MM3 (ref 142–424)
POTASSIUM: 4.3 MMOL/L (ref 3.5–5.1)
PROT SERPL-MCNC: 6.7 G/DL (ref 6.3–8.2)
PROT SERPL-MCNC: 6.7 G/DL (ref 6.3–8.2)
RBC # BLD AUTO: 3.31 M/MM3 (ref 4.6–6.2)
SODIUM SPEC-SCNC: 135 MMOL/L (ref 136–145)
WBC # BLD AUTO: 7.2 K/MM3 (ref 4.8–10.8)

## 2020-07-25 VITALS — HEART RATE: 74 BPM | OXYGEN SATURATION: 97 %

## 2020-07-25 VITALS — HEART RATE: 76 BPM | OXYGEN SATURATION: 99 % | RESPIRATION RATE: 18 BRPM

## 2020-07-25 VITALS
DIASTOLIC BLOOD PRESSURE: 68 MMHG | RESPIRATION RATE: 18 BRPM | SYSTOLIC BLOOD PRESSURE: 98 MMHG | TEMPERATURE: 98.6 F | HEART RATE: 76 BPM | OXYGEN SATURATION: 99 %

## 2020-07-25 VITALS
SYSTOLIC BLOOD PRESSURE: 135 MMHG | DIASTOLIC BLOOD PRESSURE: 68 MMHG | HEART RATE: 71 BPM | OXYGEN SATURATION: 96 % | TEMPERATURE: 98.4 F | RESPIRATION RATE: 18 BRPM

## 2020-07-25 LAB — HCV AB SER IA-ACNC: >11 S/CO RATIO (ref 0–0.9)

## 2020-07-30 ENCOUNTER — HOSPITAL ENCOUNTER (INPATIENT)
Dept: HOSPITAL 22 - ER | Age: 45
LOS: 2 days | Discharge: ANOTHER HOSPITAL | DRG: 602 | End: 2020-08-01
Payer: MEDICARE

## 2020-07-30 VITALS
HEART RATE: 83 BPM | DIASTOLIC BLOOD PRESSURE: 116 MMHG | SYSTOLIC BLOOD PRESSURE: 202 MMHG | OXYGEN SATURATION: 98 % | RESPIRATION RATE: 18 BRPM | TEMPERATURE: 97.8 F

## 2020-07-30 VITALS
DIASTOLIC BLOOD PRESSURE: 114 MMHG | SYSTOLIC BLOOD PRESSURE: 186 MMHG | OXYGEN SATURATION: 98 % | HEART RATE: 81 BPM | RESPIRATION RATE: 18 BRPM

## 2020-07-30 VITALS
OXYGEN SATURATION: 100 % | TEMPERATURE: 98.78 F | RESPIRATION RATE: 20 BRPM | DIASTOLIC BLOOD PRESSURE: 99 MMHG | HEART RATE: 81 BPM | SYSTOLIC BLOOD PRESSURE: 165 MMHG

## 2020-07-30 VITALS
RESPIRATION RATE: 18 BRPM | SYSTOLIC BLOOD PRESSURE: 189 MMHG | DIASTOLIC BLOOD PRESSURE: 109 MMHG | OXYGEN SATURATION: 99 % | HEART RATE: 88 BPM

## 2020-07-30 VITALS
DIASTOLIC BLOOD PRESSURE: 111 MMHG | RESPIRATION RATE: 18 BRPM | TEMPERATURE: 97.8 F | OXYGEN SATURATION: 99 % | SYSTOLIC BLOOD PRESSURE: 202 MMHG | HEART RATE: 93 BPM

## 2020-07-30 VITALS — BODY MASS INDEX: 37.8 KG/M2 | BODY MASS INDEX: 38.4 KG/M2

## 2020-07-30 DIAGNOSIS — L03.116: ICD-10-CM

## 2020-07-30 DIAGNOSIS — M72.6: ICD-10-CM

## 2020-07-30 DIAGNOSIS — I89.0: ICD-10-CM

## 2020-07-30 DIAGNOSIS — B96.89: ICD-10-CM

## 2020-07-30 DIAGNOSIS — L03.115: ICD-10-CM

## 2020-07-30 DIAGNOSIS — B95.7: ICD-10-CM

## 2020-07-30 DIAGNOSIS — J44.9: ICD-10-CM

## 2020-07-30 DIAGNOSIS — Z16.24: ICD-10-CM

## 2020-07-30 DIAGNOSIS — L02.611: Primary | ICD-10-CM

## 2020-07-30 DIAGNOSIS — Z72.0: ICD-10-CM

## 2020-07-30 DIAGNOSIS — I10: ICD-10-CM

## 2020-07-30 LAB
ALBUMIN LEVEL: 2.8 G/DL (ref 3.5–5)
ALBUMIN/GLOB SERPL: 0.7 {RATIO} (ref 1.1–1.8)
ALP ISO SERPL-ACNC: 278 U/L (ref 38–126)
ALT SERPLBLD-CCNC: 36 U/L (ref 12–78)
ANION GAP SERPL CALC-SCNC: 9.2 MEQ/L (ref 5–15)
AST SERPL QL: 73 U/L (ref 17–59)
BILIRUBIN,TOTAL: 2 MG/DL (ref 0.2–1.3)
BUN SERPL-MCNC: 10 MG/DL (ref 9–20)
CALCIUM SPEC-MCNC: 8.4 MG/DL (ref 8.4–10.2)
CHLORIDE SPEC-SCNC: 104 MMOL/L (ref 98–107)
CO2 SERPL-SCNC: 28 MMOL/L (ref 22–30)
CREAT BLD-SCNC: 0.6 MG/DL (ref 0.66–1.25)
CREATININE CLEARANCE ESTIMATED: 257 ML/MIN (ref 50–200)
CRP SERPL HS-MCNC: 19.6 MG/L (ref 0–4)
ESTIMATED GLOMERULAR FILT RATE: 145 ML/MIN (ref 60–?)
GFR (AFRICAN AMERICAN): 176 ML/MIN (ref 60–?)
GLOBULIN SER CALC-MCNC: 4.1 G/DL (ref 1.3–3.2)
GLUCOSE: 108 MG/DL (ref 74–100)
HCT VFR BLD CALC: 33.5 % (ref 42–52)
HGB BLD-MCNC: 11.1 G/DL (ref 14.1–18)
MCHC RBC-ENTMCNC: 33.3 G/DL (ref 31.8–35.4)
MCV RBC: 100.2 FL (ref 80–94)
MEAN CORPUSCULAR HEMOGLOBIN: 33.3 PG (ref 27–31.2)
PLATELET # BLD: 116 K/MM3 (ref 142–424)
POTASSIUM: 3.2 MMOL/L (ref 3.5–5.1)
PROT SERPL-MCNC: 6.9 G/DL (ref 6.3–8.2)
RBC # BLD AUTO: 3.34 M/MM3 (ref 4.6–6.2)
SODIUM SPEC-SCNC: 138 MMOL/L (ref 136–145)
WBC # BLD AUTO: 3.6 K/MM3 (ref 4.8–10.8)

## 2020-07-30 PROCEDURE — 86140 C-REACTIVE PROTEIN: CPT

## 2020-07-30 PROCEDURE — 73630 X-RAY EXAM OF FOOT: CPT

## 2020-07-30 PROCEDURE — 85025 COMPLETE CBC W/AUTO DIFF WBC: CPT

## 2020-07-30 PROCEDURE — 94640 AIRWAY INHALATION TREATMENT: CPT

## 2020-07-30 PROCEDURE — 87186 SC STD MICRODIL/AGAR DIL: CPT

## 2020-07-30 PROCEDURE — 36415 COLL VENOUS BLD VENIPUNCTURE: CPT

## 2020-07-30 PROCEDURE — 80053 COMPREHEN METABOLIC PANEL: CPT

## 2020-07-30 PROCEDURE — 87077 CULTURE AEROBIC IDENTIFY: CPT

## 2020-07-30 PROCEDURE — 96365 THER/PROPH/DIAG IV INF INIT: CPT

## 2020-07-30 PROCEDURE — 87040 BLOOD CULTURE FOR BACTERIA: CPT

## 2020-07-30 PROCEDURE — 82746 ASSAY OF FOLIC ACID SERUM: CPT

## 2020-07-30 PROCEDURE — 99284 EMERGENCY DEPT VISIT MOD MDM: CPT

## 2020-07-30 PROCEDURE — 83605 ASSAY OF LACTIC ACID: CPT

## 2020-07-30 PROCEDURE — 96375 TX/PRO/DX INJ NEW DRUG ADDON: CPT

## 2020-07-30 PROCEDURE — 83735 ASSAY OF MAGNESIUM: CPT

## 2020-07-30 PROCEDURE — 96376 TX/PRO/DX INJ SAME DRUG ADON: CPT

## 2020-07-30 PROCEDURE — 80048 BASIC METABOLIC PNL TOTAL CA: CPT

## 2020-07-30 PROCEDURE — 82607 VITAMIN B-12: CPT

## 2020-07-31 VITALS
OXYGEN SATURATION: 100 % | SYSTOLIC BLOOD PRESSURE: 162 MMHG | HEART RATE: 88 BPM | RESPIRATION RATE: 20 BRPM | DIASTOLIC BLOOD PRESSURE: 98 MMHG | TEMPERATURE: 97.9 F

## 2020-07-31 VITALS
DIASTOLIC BLOOD PRESSURE: 72 MMHG | HEART RATE: 86 BPM | RESPIRATION RATE: 18 BRPM | OXYGEN SATURATION: 97 % | TEMPERATURE: 97.9 F | SYSTOLIC BLOOD PRESSURE: 145 MMHG

## 2020-07-31 VITALS
RESPIRATION RATE: 20 BRPM | TEMPERATURE: 97.7 F | SYSTOLIC BLOOD PRESSURE: 176 MMHG | DIASTOLIC BLOOD PRESSURE: 104 MMHG | HEART RATE: 90 BPM | OXYGEN SATURATION: 100 %

## 2020-07-31 VITALS
DIASTOLIC BLOOD PRESSURE: 72 MMHG | RESPIRATION RATE: 16 BRPM | OXYGEN SATURATION: 100 % | TEMPERATURE: 98.78 F | HEART RATE: 83 BPM | SYSTOLIC BLOOD PRESSURE: 141 MMHG

## 2020-07-31 VITALS — OXYGEN SATURATION: 100 %

## 2020-07-31 VITALS — DIASTOLIC BLOOD PRESSURE: 90 MMHG | SYSTOLIC BLOOD PRESSURE: 155 MMHG

## 2020-07-31 LAB
ANION GAP SERPL CALC-SCNC: 3.2 MEQ/L (ref 5–15)
BUN SERPL-MCNC: 9 MG/DL (ref 9–20)
CALCIUM SPEC-MCNC: 7.6 MG/DL (ref 8.4–10.2)
CHLORIDE SPEC-SCNC: 103 MMOL/L (ref 98–107)
CO2 SERPL-SCNC: 32 MMOL/L (ref 22–30)
CREAT BLD-SCNC: 0.6 MG/DL (ref 0.66–1.25)
CREATININE CLEARANCE ESTIMATED: 252 ML/MIN (ref 50–200)
ESTIMATED GLOMERULAR FILT RATE: 145 ML/MIN (ref 60–?)
GFR (AFRICAN AMERICAN): 176 ML/MIN (ref 60–?)
GLUCOSE: 109 MG/DL (ref 74–100)
HCT VFR BLD CALC: 31.6 % (ref 42–52)
HGB BLD-MCNC: 10.6 G/DL (ref 14.1–18)
MAGNESIUM: 1.6 MG/DL (ref 1.6–2.3)
MCHC RBC-ENTMCNC: 33.7 G/DL (ref 31.8–35.4)
MCV RBC: 103 FL (ref 80–94)
MEAN CORPUSCULAR HEMOGLOBIN: 34.7 PG (ref 27–31.2)
PLATELET # BLD: 96 K/MM3 (ref 142–424)
POTASSIUM: 3.2 MMOL/L (ref 3.5–5.1)
RBC # BLD AUTO: 3.06 M/MM3 (ref 4.6–6.2)
SODIUM SPEC-SCNC: 135 MMOL/L (ref 136–145)
WBC # BLD AUTO: 2.6 K/MM3 (ref 4.8–10.8)

## 2020-08-01 VITALS
RESPIRATION RATE: 19 BRPM | OXYGEN SATURATION: 99 % | HEART RATE: 82 BPM | SYSTOLIC BLOOD PRESSURE: 130 MMHG | DIASTOLIC BLOOD PRESSURE: 78 MMHG | TEMPERATURE: 98.42 F

## 2020-08-01 VITALS
DIASTOLIC BLOOD PRESSURE: 65 MMHG | RESPIRATION RATE: 17 BRPM | HEART RATE: 64 BPM | OXYGEN SATURATION: 98 % | TEMPERATURE: 98 F | SYSTOLIC BLOOD PRESSURE: 122 MMHG

## 2020-08-01 LAB
FOLATE: 12.5 NG/ML (ref 3–?)
VITAMIN B12: 634 PG/ML (ref 232–1245)

## 2020-08-11 ENCOUNTER — HOSPITAL ENCOUNTER (INPATIENT)
Dept: HOSPITAL 22 - ER | Age: 45
LOS: 7 days | Discharge: NURSING, REHAB OR LONG-TERM CARE | DRG: 571 | End: 2020-08-18
Payer: MEDICARE

## 2020-08-11 VITALS
OXYGEN SATURATION: 99 % | TEMPERATURE: 98.06 F | DIASTOLIC BLOOD PRESSURE: 116 MMHG | HEART RATE: 99 BPM | RESPIRATION RATE: 16 BRPM | SYSTOLIC BLOOD PRESSURE: 171 MMHG

## 2020-08-11 VITALS
DIASTOLIC BLOOD PRESSURE: 112 MMHG | SYSTOLIC BLOOD PRESSURE: 136 MMHG | OXYGEN SATURATION: 98 % | RESPIRATION RATE: 16 BRPM | HEART RATE: 75 BPM

## 2020-08-11 VITALS
OXYGEN SATURATION: 95 % | DIASTOLIC BLOOD PRESSURE: 69 MMHG | RESPIRATION RATE: 18 BRPM | SYSTOLIC BLOOD PRESSURE: 121 MMHG | HEART RATE: 79 BPM

## 2020-08-11 VITALS
RESPIRATION RATE: 16 BRPM | HEART RATE: 91 BPM | DIASTOLIC BLOOD PRESSURE: 127 MMHG | SYSTOLIC BLOOD PRESSURE: 163 MMHG | OXYGEN SATURATION: 97 %

## 2020-08-11 VITALS
SYSTOLIC BLOOD PRESSURE: 158 MMHG | DIASTOLIC BLOOD PRESSURE: 75 MMHG | RESPIRATION RATE: 17 BRPM | OXYGEN SATURATION: 97 % | HEART RATE: 90 BPM

## 2020-08-11 VITALS
TEMPERATURE: 98.06 F | HEART RATE: 77 BPM | SYSTOLIC BLOOD PRESSURE: 141 MMHG | RESPIRATION RATE: 15 BRPM | OXYGEN SATURATION: 90 % | DIASTOLIC BLOOD PRESSURE: 75 MMHG

## 2020-08-11 VITALS
BODY MASS INDEX: 40.4 KG/M2 | BODY MASS INDEX: 40.2 KG/M2 | BODY MASS INDEX: 41.1 KG/M2 | BODY MASS INDEX: 39.2 KG/M2 | BODY MASS INDEX: 40.1 KG/M2 | BODY MASS INDEX: 37.8 KG/M2 | BODY MASS INDEX: 36.9 KG/M2 | BODY MASS INDEX: 37.6 KG/M2

## 2020-08-11 VITALS
RESPIRATION RATE: 15 BRPM | SYSTOLIC BLOOD PRESSURE: 145 MMHG | HEART RATE: 85 BPM | OXYGEN SATURATION: 95 % | DIASTOLIC BLOOD PRESSURE: 126 MMHG

## 2020-08-11 VITALS
DIASTOLIC BLOOD PRESSURE: 116 MMHG | HEART RATE: 94 BPM | OXYGEN SATURATION: 97 % | RESPIRATION RATE: 16 BRPM | SYSTOLIC BLOOD PRESSURE: 150 MMHG

## 2020-08-11 DIAGNOSIS — Z88.8: ICD-10-CM

## 2020-08-11 DIAGNOSIS — L97.821: ICD-10-CM

## 2020-08-11 DIAGNOSIS — L03.115: ICD-10-CM

## 2020-08-11 DIAGNOSIS — L97.513: Primary | ICD-10-CM

## 2020-08-11 DIAGNOSIS — B96.20: ICD-10-CM

## 2020-08-11 DIAGNOSIS — E66.01: ICD-10-CM

## 2020-08-11 DIAGNOSIS — Z16.24: ICD-10-CM

## 2020-08-11 DIAGNOSIS — Z79.899: ICD-10-CM

## 2020-08-11 DIAGNOSIS — L03.116: ICD-10-CM

## 2020-08-11 LAB
ALBUMIN LEVEL: 2.8 G/DL (ref 3.5–5)
ALBUMIN/GLOB SERPL: 0.7 {RATIO} (ref 1.1–1.8)
ALP ISO SERPL-ACNC: 210 U/L (ref 38–126)
ALT SERPLBLD-CCNC: 36 U/L (ref 12–78)
ANION GAP SERPL CALC-SCNC: 6.6 MEQ/L (ref 5–15)
AST SERPL QL: 71 U/L (ref 17–59)
BILIRUBIN,TOTAL: 2.5 MG/DL (ref 0.2–1.3)
BUN SERPL-MCNC: 13 MG/DL (ref 9–20)
CALCIUM SPEC-MCNC: 8.1 MG/DL (ref 8.4–10.2)
CHLORIDE SPEC-SCNC: 100 MMOL/L (ref 98–107)
CO2 SERPL-SCNC: 33 MMOL/L (ref 22–30)
CREAT BLD-SCNC: 0.8 MG/DL (ref 0.66–1.25)
CREATININE CLEARANCE ESTIMATED: 185 ML/MIN (ref 50–200)
CRP SERPL HS-MCNC: 15.4 MG/L (ref 0–4)
ESTIMATED GLOMERULAR FILT RATE: 104 ML/MIN (ref 60–?)
GFR (AFRICAN AMERICAN): 126 ML/MIN (ref 60–?)
GLOBULIN SER CALC-MCNC: 4.1 G/DL (ref 1.3–3.2)
GLUCOSE: 97 MG/DL (ref 74–100)
HCT VFR BLD CALC: 24.6 % (ref 42–52)
HGB BLD-MCNC: 8.3 G/DL (ref 14.1–18)
MCHC RBC-ENTMCNC: 33.9 G/DL (ref 31.8–35.4)
MCV RBC: 98 FL (ref 80–94)
MEAN CORPUSCULAR HEMOGLOBIN: 33.3 PG (ref 27–31.2)
PLATELET # BLD: 102 K/MM3 (ref 142–424)
POTASSIUM: 2.6 MMOL/L (ref 3.5–5.1)
PROT SERPL-MCNC: 6.9 G/DL (ref 6.3–8.2)
RBC # BLD AUTO: 2.51 M/MM3 (ref 4.6–6.2)
SODIUM SPEC-SCNC: 137 MMOL/L (ref 136–145)
WBC # BLD AUTO: 4.1 K/MM3 (ref 4.8–10.8)

## 2020-08-11 PROCEDURE — 86850 RBC ANTIBODY SCREEN: CPT

## 2020-08-11 PROCEDURE — 96375 TX/PRO/DX INJ NEW DRUG ADDON: CPT

## 2020-08-11 PROCEDURE — 87040 BLOOD CULTURE FOR BACTERIA: CPT

## 2020-08-11 PROCEDURE — 73630 X-RAY EXAM OF FOOT: CPT

## 2020-08-11 PROCEDURE — 96365 THER/PROPH/DIAG IV INF INIT: CPT

## 2020-08-11 PROCEDURE — 87077 CULTURE AEROBIC IDENTIFY: CPT

## 2020-08-11 PROCEDURE — 87205 SMEAR GRAM STAIN: CPT

## 2020-08-11 PROCEDURE — 96376 TX/PRO/DX INJ SAME DRUG ADON: CPT

## 2020-08-11 PROCEDURE — 99285 EMERGENCY DEPT VISIT HI MDM: CPT

## 2020-08-11 PROCEDURE — 87581 M.PNEUMON DNA AMP PROBE: CPT

## 2020-08-11 PROCEDURE — C1751 CATH, INF, PER/CENT/MIDLINE: HCPCS

## 2020-08-11 PROCEDURE — 85014 HEMATOCRIT: CPT

## 2020-08-11 PROCEDURE — 97165 OT EVAL LOW COMPLEX 30 MIN: CPT

## 2020-08-11 PROCEDURE — 85025 COMPLETE CBC W/AUTO DIFF WBC: CPT

## 2020-08-11 PROCEDURE — 96366 THER/PROPH/DIAG IV INF ADDON: CPT

## 2020-08-11 PROCEDURE — 87070 CULTURE OTHR SPECIMN AEROBIC: CPT

## 2020-08-11 PROCEDURE — 85651 RBC SED RATE NONAUTOMATED: CPT

## 2020-08-11 PROCEDURE — 80048 BASIC METABOLIC PNL TOTAL CA: CPT

## 2020-08-11 PROCEDURE — 88311 DECALCIFY TISSUE: CPT

## 2020-08-11 PROCEDURE — 94640 AIRWAY INHALATION TREATMENT: CPT

## 2020-08-11 PROCEDURE — 87081 CULTURE SCREEN ONLY: CPT

## 2020-08-11 PROCEDURE — 87798 DETECT AGENT NOS DNA AMP: CPT

## 2020-08-11 PROCEDURE — 97535 SELF CARE MNGMENT TRAINING: CPT

## 2020-08-11 PROCEDURE — 80202 ASSAY OF VANCOMYCIN: CPT

## 2020-08-11 PROCEDURE — 73700 CT LOWER EXTREMITY W/O DYE: CPT

## 2020-08-11 PROCEDURE — 71045 X-RAY EXAM CHEST 1 VIEW: CPT

## 2020-08-11 PROCEDURE — P9016 RBC LEUKOCYTES REDUCED: HCPCS

## 2020-08-11 PROCEDURE — 80053 COMPREHEN METABOLIC PANEL: CPT

## 2020-08-11 PROCEDURE — 87186 SC STD MICRODIL/AGAR DIL: CPT

## 2020-08-11 PROCEDURE — 83605 ASSAY OF LACTIC ACID: CPT

## 2020-08-11 PROCEDURE — 36569 INSJ PICC 5 YR+ W/O IMAGING: CPT

## 2020-08-11 PROCEDURE — 11046 DBRDMT MUSC&/FSCA EA ADDL: CPT

## 2020-08-11 PROCEDURE — 88307 TISSUE EXAM BY PATHOLOGIST: CPT

## 2020-08-11 PROCEDURE — 97161 PT EVAL LOW COMPLEX 20 MIN: CPT

## 2020-08-11 PROCEDURE — 36415 COLL VENOUS BLD VENIPUNCTURE: CPT

## 2020-08-11 PROCEDURE — 86140 C-REACTIVE PROTEIN: CPT

## 2020-08-11 PROCEDURE — 85018 HEMOGLOBIN: CPT

## 2020-08-11 PROCEDURE — 93005 ELECTROCARDIOGRAM TRACING: CPT

## 2020-08-11 PROCEDURE — 97110 THERAPEUTIC EXERCISES: CPT

## 2020-08-11 PROCEDURE — 97530 THERAPEUTIC ACTIVITIES: CPT

## 2020-08-11 PROCEDURE — 87633 RESP VIRUS 12-25 TARGETS: CPT

## 2020-08-11 PROCEDURE — 11043 DBRDMT MUSC&/FSCA 1ST 20/<: CPT

## 2020-08-12 VITALS
SYSTOLIC BLOOD PRESSURE: 128 MMHG | OXYGEN SATURATION: 99 % | DIASTOLIC BLOOD PRESSURE: 72 MMHG | RESPIRATION RATE: 14 BRPM | TEMPERATURE: 97.6 F | HEART RATE: 72 BPM

## 2020-08-12 VITALS
SYSTOLIC BLOOD PRESSURE: 117 MMHG | RESPIRATION RATE: 18 BRPM | DIASTOLIC BLOOD PRESSURE: 76 MMHG | OXYGEN SATURATION: 98 % | HEART RATE: 73 BPM

## 2020-08-12 VITALS
SYSTOLIC BLOOD PRESSURE: 138 MMHG | TEMPERATURE: 97.88 F | OXYGEN SATURATION: 100 % | DIASTOLIC BLOOD PRESSURE: 92 MMHG | RESPIRATION RATE: 18 BRPM | HEART RATE: 71 BPM

## 2020-08-12 VITALS
OXYGEN SATURATION: 98 % | DIASTOLIC BLOOD PRESSURE: 65 MMHG | SYSTOLIC BLOOD PRESSURE: 116 MMHG | HEART RATE: 65 BPM | RESPIRATION RATE: 18 BRPM | TEMPERATURE: 97.3 F

## 2020-08-12 VITALS
DIASTOLIC BLOOD PRESSURE: 57 MMHG | RESPIRATION RATE: 16 BRPM | HEART RATE: 66 BPM | OXYGEN SATURATION: 99 % | TEMPERATURE: 97.88 F | SYSTOLIC BLOOD PRESSURE: 110 MMHG

## 2020-08-12 VITALS
HEART RATE: 63 BPM | TEMPERATURE: 97.5 F | DIASTOLIC BLOOD PRESSURE: 73 MMHG | SYSTOLIC BLOOD PRESSURE: 124 MMHG | RESPIRATION RATE: 16 BRPM | OXYGEN SATURATION: 98 %

## 2020-08-12 VITALS
RESPIRATION RATE: 18 BRPM | HEART RATE: 65 BPM | SYSTOLIC BLOOD PRESSURE: 136 MMHG | OXYGEN SATURATION: 97 % | DIASTOLIC BLOOD PRESSURE: 77 MMHG

## 2020-08-12 VITALS
SYSTOLIC BLOOD PRESSURE: 137 MMHG | RESPIRATION RATE: 18 BRPM | HEART RATE: 64 BPM | OXYGEN SATURATION: 98 % | DIASTOLIC BLOOD PRESSURE: 64 MMHG

## 2020-08-12 VITALS
HEART RATE: 67 BPM | RESPIRATION RATE: 18 BRPM | OXYGEN SATURATION: 99 % | SYSTOLIC BLOOD PRESSURE: 143 MMHG | DIASTOLIC BLOOD PRESSURE: 68 MMHG

## 2020-08-12 VITALS
RESPIRATION RATE: 16 BRPM | DIASTOLIC BLOOD PRESSURE: 71 MMHG | OXYGEN SATURATION: 100 % | HEART RATE: 73 BPM | SYSTOLIC BLOOD PRESSURE: 129 MMHG

## 2020-08-12 VITALS
RESPIRATION RATE: 18 BRPM | TEMPERATURE: 97.8 F | HEART RATE: 64 BPM | SYSTOLIC BLOOD PRESSURE: 129 MMHG | DIASTOLIC BLOOD PRESSURE: 71 MMHG | OXYGEN SATURATION: 96 %

## 2020-08-12 VITALS
HEART RATE: 76 BPM | SYSTOLIC BLOOD PRESSURE: 118 MMHG | DIASTOLIC BLOOD PRESSURE: 67 MMHG | OXYGEN SATURATION: 97 % | RESPIRATION RATE: 16 BRPM

## 2020-08-12 VITALS
OXYGEN SATURATION: 99 % | SYSTOLIC BLOOD PRESSURE: 132 MMHG | HEART RATE: 72 BPM | TEMPERATURE: 97.5 F | DIASTOLIC BLOOD PRESSURE: 78 MMHG | RESPIRATION RATE: 18 BRPM

## 2020-08-12 VITALS
RESPIRATION RATE: 20 BRPM | HEART RATE: 74 BPM | DIASTOLIC BLOOD PRESSURE: 54 MMHG | TEMPERATURE: 98.24 F | OXYGEN SATURATION: 98 % | SYSTOLIC BLOOD PRESSURE: 120 MMHG

## 2020-08-12 VITALS
DIASTOLIC BLOOD PRESSURE: 68 MMHG | HEART RATE: 70 BPM | OXYGEN SATURATION: 99 % | TEMPERATURE: 97.8 F | SYSTOLIC BLOOD PRESSURE: 141 MMHG | RESPIRATION RATE: 18 BRPM

## 2020-08-12 VITALS
HEART RATE: 61 BPM | SYSTOLIC BLOOD PRESSURE: 124 MMHG | OXYGEN SATURATION: 99 % | TEMPERATURE: 97.52 F | RESPIRATION RATE: 18 BRPM | DIASTOLIC BLOOD PRESSURE: 73 MMHG

## 2020-08-12 VITALS
OXYGEN SATURATION: 98 % | DIASTOLIC BLOOD PRESSURE: 112 MMHG | SYSTOLIC BLOOD PRESSURE: 136 MMHG | RESPIRATION RATE: 16 BRPM | TEMPERATURE: 98.06 F | HEART RATE: 75 BPM

## 2020-08-12 VITALS — OXYGEN SATURATION: 100 %

## 2020-08-12 VITALS — SYSTOLIC BLOOD PRESSURE: 129 MMHG | DIASTOLIC BLOOD PRESSURE: 71 MMHG | HEART RATE: 64 BPM | TEMPERATURE: 97.88 F

## 2020-08-12 VITALS
SYSTOLIC BLOOD PRESSURE: 124 MMHG | DIASTOLIC BLOOD PRESSURE: 76 MMHG | OXYGEN SATURATION: 100 % | RESPIRATION RATE: 16 BRPM | HEART RATE: 72 BPM

## 2020-08-12 VITALS — RESPIRATION RATE: 18 BRPM

## 2020-08-12 VITALS
RESPIRATION RATE: 16 BRPM | DIASTOLIC BLOOD PRESSURE: 88 MMHG | SYSTOLIC BLOOD PRESSURE: 129 MMHG | HEART RATE: 83 BPM | OXYGEN SATURATION: 98 %

## 2020-08-12 VITALS
HEART RATE: 65 BPM | RESPIRATION RATE: 18 BRPM | OXYGEN SATURATION: 99 % | SYSTOLIC BLOOD PRESSURE: 125 MMHG | DIASTOLIC BLOOD PRESSURE: 77 MMHG

## 2020-08-12 VITALS
RESPIRATION RATE: 16 BRPM | TEMPERATURE: 97.7 F | DIASTOLIC BLOOD PRESSURE: 57 MMHG | SYSTOLIC BLOOD PRESSURE: 116 MMHG | HEART RATE: 66 BPM | OXYGEN SATURATION: 97 %

## 2020-08-12 VITALS
OXYGEN SATURATION: 100 % | HEART RATE: 76 BPM | RESPIRATION RATE: 18 BRPM | DIASTOLIC BLOOD PRESSURE: 75 MMHG | SYSTOLIC BLOOD PRESSURE: 145 MMHG | TEMPERATURE: 97.88 F

## 2020-08-12 LAB
ALBUMIN LEVEL: 2.2 G/DL (ref 3.5–5)
ALBUMIN/GLOB SERPL: 0.6 {RATIO} (ref 1.1–1.8)
ALP ISO SERPL-ACNC: 145 U/L (ref 38–126)
ALT SERPLBLD-CCNC: 29 U/L (ref 12–78)
ANION GAP SERPL CALC-SCNC: 5.3 MEQ/L (ref 5–15)
AST SERPL QL: 57 U/L (ref 17–59)
BILIRUBIN,TOTAL: 2.4 MG/DL (ref 0.2–1.3)
BUN SERPL-MCNC: 11 MG/DL (ref 9–20)
CALCIUM SPEC-MCNC: 8 MG/DL (ref 8.4–10.2)
CHLORIDE SPEC-SCNC: 104 MMOL/L (ref 98–107)
CO2 SERPL-SCNC: 31 MMOL/L (ref 22–30)
CREAT BLD-SCNC: 0.7 MG/DL (ref 0.66–1.25)
CREATININE CLEARANCE ESTIMATED: 215 ML/MIN (ref 50–200)
ESTIMATED GLOMERULAR FILT RATE: 121 ML/MIN (ref 60–?)
GFR (AFRICAN AMERICAN): 147 ML/MIN (ref 60–?)
GLOBULIN SER CALC-MCNC: 3.7 G/DL (ref 1.3–3.2)
GLUCOSE: 98 MG/DL (ref 74–100)
HCT VFR BLD CALC: 25.1 % (ref 42–52)
HGB BLD-MCNC: 8.2 G/DL (ref 14.1–18)
MCHC RBC-ENTMCNC: 32.4 G/DL (ref 31.8–35.4)
MCV RBC: 100.3 FL (ref 80–94)
MEAN CORPUSCULAR HEMOGLOBIN: 32.6 PG (ref 27–31.2)
PLATELET # BLD: 79 K/MM3 (ref 142–424)
POTASSIUM: 3.3 MMOL/L (ref 3.5–5.1)
PROT SERPL-MCNC: 5.9 G/DL (ref 6.3–8.2)
RBC # BLD AUTO: 2.51 M/MM3 (ref 4.6–6.2)
SODIUM SPEC-SCNC: 137 MMOL/L (ref 136–145)
WBC # BLD AUTO: 2.8 K/MM3 (ref 4.8–10.8)

## 2020-08-12 PROCEDURE — 0JBQ0ZZ EXCISION OF RIGHT FOOT SUBCUTANEOUS TISSUE AND FASCIA, OPEN APPROACH: ICD-10-PCS | Performed by: PODIATRIST

## 2020-08-13 VITALS
TEMPERATURE: 98.5 F | HEART RATE: 66 BPM | RESPIRATION RATE: 16 BRPM | OXYGEN SATURATION: 99 % | SYSTOLIC BLOOD PRESSURE: 131 MMHG | DIASTOLIC BLOOD PRESSURE: 67 MMHG

## 2020-08-13 VITALS
RESPIRATION RATE: 18 BRPM | DIASTOLIC BLOOD PRESSURE: 61 MMHG | TEMPERATURE: 98.06 F | HEART RATE: 65 BPM | SYSTOLIC BLOOD PRESSURE: 134 MMHG | OXYGEN SATURATION: 98 %

## 2020-08-13 VITALS
HEART RATE: 68 BPM | DIASTOLIC BLOOD PRESSURE: 55 MMHG | RESPIRATION RATE: 17 BRPM | SYSTOLIC BLOOD PRESSURE: 130 MMHG | TEMPERATURE: 97.6 F | OXYGEN SATURATION: 99 %

## 2020-08-13 VITALS
HEART RATE: 91 BPM | SYSTOLIC BLOOD PRESSURE: 111 MMHG | DIASTOLIC BLOOD PRESSURE: 68 MMHG | OXYGEN SATURATION: 99 % | RESPIRATION RATE: 18 BRPM | TEMPERATURE: 98.06 F

## 2020-08-13 VITALS
OXYGEN SATURATION: 99 % | SYSTOLIC BLOOD PRESSURE: 143 MMHG | HEART RATE: 71 BPM | DIASTOLIC BLOOD PRESSURE: 80 MMHG | RESPIRATION RATE: 18 BRPM | TEMPERATURE: 98.3 F

## 2020-08-13 VITALS
HEART RATE: 68 BPM | TEMPERATURE: 97.6 F | OXYGEN SATURATION: 98 % | DIASTOLIC BLOOD PRESSURE: 55 MMHG | RESPIRATION RATE: 17 BRPM | SYSTOLIC BLOOD PRESSURE: 130 MMHG

## 2020-08-13 VITALS
DIASTOLIC BLOOD PRESSURE: 64 MMHG | TEMPERATURE: 98.4 F | HEART RATE: 67 BPM | RESPIRATION RATE: 20 BRPM | OXYGEN SATURATION: 97 % | SYSTOLIC BLOOD PRESSURE: 126 MMHG

## 2020-08-13 VITALS
SYSTOLIC BLOOD PRESSURE: 142 MMHG | RESPIRATION RATE: 20 BRPM | OXYGEN SATURATION: 99 % | HEART RATE: 71 BPM | TEMPERATURE: 97.5 F | DIASTOLIC BLOOD PRESSURE: 77 MMHG

## 2020-08-13 VITALS
SYSTOLIC BLOOD PRESSURE: 115 MMHG | DIASTOLIC BLOOD PRESSURE: 57 MMHG | OXYGEN SATURATION: 99 % | TEMPERATURE: 97.7 F | RESPIRATION RATE: 20 BRPM | HEART RATE: 69 BPM

## 2020-08-13 VITALS
RESPIRATION RATE: 18 BRPM | OXYGEN SATURATION: 100 % | TEMPERATURE: 97.7 F | SYSTOLIC BLOOD PRESSURE: 137 MMHG | DIASTOLIC BLOOD PRESSURE: 70 MMHG | HEART RATE: 67 BPM

## 2020-08-13 VITALS
RESPIRATION RATE: 18 BRPM | DIASTOLIC BLOOD PRESSURE: 58 MMHG | HEART RATE: 60 BPM | SYSTOLIC BLOOD PRESSURE: 128 MMHG | OXYGEN SATURATION: 98 % | TEMPERATURE: 97.7 F

## 2020-08-13 VITALS
TEMPERATURE: 97.6 F | RESPIRATION RATE: 20 BRPM | HEART RATE: 68 BPM | OXYGEN SATURATION: 99 % | SYSTOLIC BLOOD PRESSURE: 121 MMHG | DIASTOLIC BLOOD PRESSURE: 67 MMHG

## 2020-08-13 VITALS
TEMPERATURE: 98.06 F | OXYGEN SATURATION: 94 % | RESPIRATION RATE: 20 BRPM | HEART RATE: 67 BPM | SYSTOLIC BLOOD PRESSURE: 129 MMHG | DIASTOLIC BLOOD PRESSURE: 70 MMHG

## 2020-08-13 VITALS
SYSTOLIC BLOOD PRESSURE: 138 MMHG | RESPIRATION RATE: 20 BRPM | DIASTOLIC BLOOD PRESSURE: 48 MMHG | TEMPERATURE: 97.7 F | HEART RATE: 70 BPM | OXYGEN SATURATION: 99 %

## 2020-08-13 VITALS — RESPIRATION RATE: 20 BRPM

## 2020-08-13 VITALS
SYSTOLIC BLOOD PRESSURE: 101 MMHG | DIASTOLIC BLOOD PRESSURE: 61 MMHG | RESPIRATION RATE: 17 BRPM | TEMPERATURE: 98.06 F | HEART RATE: 62 BPM | OXYGEN SATURATION: 93 %

## 2020-08-13 VITALS
TEMPERATURE: 98.1 F | SYSTOLIC BLOOD PRESSURE: 132 MMHG | RESPIRATION RATE: 20 BRPM | OXYGEN SATURATION: 100 % | HEART RATE: 68 BPM | DIASTOLIC BLOOD PRESSURE: 75 MMHG

## 2020-08-13 VITALS — OXYGEN SATURATION: 100 %

## 2020-08-13 LAB
ALBUMIN LEVEL: 2.2 G/DL (ref 3.5–5)
ALBUMIN/GLOB SERPL: 0.6 {RATIO} (ref 1.1–1.8)
ALP ISO SERPL-ACNC: 169 U/L (ref 38–126)
ALT SERPLBLD-CCNC: 27 U/L (ref 12–78)
ANION GAP SERPL CALC-SCNC: 5.9 MEQ/L (ref 5–15)
AST SERPL QL: 49 U/L (ref 17–59)
BILIRUBIN,TOTAL: 1 MG/DL (ref 0.2–1.3)
BUN SERPL-MCNC: 16 MG/DL (ref 9–20)
CALCIUM SPEC-MCNC: 8.2 MG/DL (ref 8.4–10.2)
CHLORIDE SPEC-SCNC: 105 MMOL/L (ref 98–107)
CO2 SERPL-SCNC: 29 MMOL/L (ref 22–30)
CREAT BLD-SCNC: 0.8 MG/DL (ref 0.66–1.25)
CREATININE CLEARANCE ESTIMATED: 188 ML/MIN (ref 50–200)
ESTIMATED GLOMERULAR FILT RATE: 104 ML/MIN (ref 60–?)
GFR (AFRICAN AMERICAN): 126 ML/MIN (ref 60–?)
GLOBULIN SER CALC-MCNC: 3.5 G/DL (ref 1.3–3.2)
GLUCOSE: 113 MG/DL (ref 74–100)
HCT VFR BLD CALC: 22.9 % (ref 42–52)
HCT VFR BLD CALC: 26.3 % (ref 42–52)
HGB BLD-MCNC: 8 G/DL (ref 14.1–18)
HGB BLD-MCNC: 9 G/DL (ref 14.1–18)
MCHC RBC-ENTMCNC: 35.1 G/DL (ref 31.8–35.4)
MCV RBC: 96 FL (ref 80–94)
MEAN CORPUSCULAR HEMOGLOBIN: 33.7 PG (ref 27–31.2)
PLATELET # BLD: 92 K/MM3 (ref 142–424)
POTASSIUM: 3.9 MMOL/L (ref 3.5–5.1)
PROT SERPL-MCNC: 5.7 G/DL (ref 6.3–8.2)
RBC # BLD AUTO: 2.38 M/MM3 (ref 4.6–6.2)
SODIUM SPEC-SCNC: 136 MMOL/L (ref 136–145)
VANCOMYCIN TROUGH SERPL-MCNC: 23.9 UG/ML (ref 5–10)
WBC # BLD AUTO: 5.8 K/MM3 (ref 4.8–10.8)

## 2020-08-14 VITALS
DIASTOLIC BLOOD PRESSURE: 60 MMHG | TEMPERATURE: 98 F | OXYGEN SATURATION: 98 % | SYSTOLIC BLOOD PRESSURE: 116 MMHG | HEART RATE: 106 BPM | RESPIRATION RATE: 17 BRPM

## 2020-08-14 VITALS
HEART RATE: 70 BPM | RESPIRATION RATE: 17 BRPM | DIASTOLIC BLOOD PRESSURE: 69 MMHG | TEMPERATURE: 98.42 F | OXYGEN SATURATION: 98 % | SYSTOLIC BLOOD PRESSURE: 126 MMHG

## 2020-08-14 VITALS
RESPIRATION RATE: 18 BRPM | HEART RATE: 71 BPM | OXYGEN SATURATION: 99 % | TEMPERATURE: 97.9 F | SYSTOLIC BLOOD PRESSURE: 116 MMHG | DIASTOLIC BLOOD PRESSURE: 67 MMHG

## 2020-08-14 VITALS
RESPIRATION RATE: 18 BRPM | OXYGEN SATURATION: 99 % | SYSTOLIC BLOOD PRESSURE: 149 MMHG | DIASTOLIC BLOOD PRESSURE: 56 MMHG | TEMPERATURE: 98.1 F | HEART RATE: 65 BPM

## 2020-08-14 VITALS
DIASTOLIC BLOOD PRESSURE: 76 MMHG | SYSTOLIC BLOOD PRESSURE: 110 MMHG | TEMPERATURE: 98.42 F | OXYGEN SATURATION: 99 % | HEART RATE: 70 BPM | RESPIRATION RATE: 16 BRPM

## 2020-08-15 VITALS
HEART RATE: 67 BPM | DIASTOLIC BLOOD PRESSURE: 77 MMHG | SYSTOLIC BLOOD PRESSURE: 137 MMHG | RESPIRATION RATE: 16 BRPM | TEMPERATURE: 97.7 F | OXYGEN SATURATION: 99 %

## 2020-08-15 VITALS — OXYGEN SATURATION: 98 %

## 2020-08-15 VITALS
RESPIRATION RATE: 14 BRPM | OXYGEN SATURATION: 98 % | DIASTOLIC BLOOD PRESSURE: 67 MMHG | SYSTOLIC BLOOD PRESSURE: 135 MMHG | HEART RATE: 63 BPM

## 2020-08-15 VITALS
HEART RATE: 89 BPM | SYSTOLIC BLOOD PRESSURE: 120 MMHG | OXYGEN SATURATION: 98 % | RESPIRATION RATE: 18 BRPM | TEMPERATURE: 98.1 F | DIASTOLIC BLOOD PRESSURE: 67 MMHG

## 2020-08-15 VITALS
RESPIRATION RATE: 19 BRPM | OXYGEN SATURATION: 97 % | SYSTOLIC BLOOD PRESSURE: 113 MMHG | HEART RATE: 68 BPM | DIASTOLIC BLOOD PRESSURE: 62 MMHG | TEMPERATURE: 98 F

## 2020-08-15 VITALS
TEMPERATURE: 98.4 F | OXYGEN SATURATION: 97 % | DIASTOLIC BLOOD PRESSURE: 50 MMHG | RESPIRATION RATE: 14 BRPM | SYSTOLIC BLOOD PRESSURE: 128 MMHG | HEART RATE: 67 BPM

## 2020-08-15 VITALS
SYSTOLIC BLOOD PRESSURE: 118 MMHG | HEART RATE: 66 BPM | RESPIRATION RATE: 17 BRPM | DIASTOLIC BLOOD PRESSURE: 61 MMHG | OXYGEN SATURATION: 99 % | TEMPERATURE: 98.06 F

## 2020-08-15 VITALS — HEART RATE: 66 BPM | OXYGEN SATURATION: 96 %

## 2020-08-15 LAB
ANION GAP SERPL CALC-SCNC: 4.7 MEQ/L (ref 5–15)
BUN SERPL-MCNC: 15 MG/DL (ref 9–20)
CALCIUM SPEC-MCNC: 8.3 MG/DL (ref 8.4–10.2)
CHLORIDE SPEC-SCNC: 106 MMOL/L (ref 98–107)
CO2 SERPL-SCNC: 30 MMOL/L (ref 22–30)
CREAT BLD-SCNC: 0.7 MG/DL (ref 0.66–1.25)
CREATININE CLEARANCE ESTIMATED: 128 ML/MIN (ref 50–200)
ESTIMATED GLOMERULAR FILT RATE: 121 ML/MIN (ref 60–?)
GFR (AFRICAN AMERICAN): 147 ML/MIN (ref 60–?)
GLUCOSE: 126 MG/DL (ref 74–100)
HCT VFR BLD CALC: 29.1 % (ref 42–52)
HGB BLD-MCNC: 9.4 G/DL (ref 14.1–18)
MCHC RBC-ENTMCNC: 32.3 G/DL (ref 31.8–35.4)
MCV RBC: 100.9 FL (ref 80–94)
MEAN CORPUSCULAR HEMOGLOBIN: 32.6 PG (ref 27–31.2)
PLATELET # BLD: 130 K/MM3 (ref 142–424)
POTASSIUM: 3.7 MMOL/L (ref 3.5–5.1)
RBC # BLD AUTO: 2.88 M/MM3 (ref 4.6–6.2)
SODIUM SPEC-SCNC: 137 MMOL/L (ref 136–145)
VANCOMYCIN TROUGH SERPL-MCNC: 24.8 UG/ML (ref 5–10)
WBC # BLD AUTO: 4.7 K/MM3 (ref 4.8–10.8)

## 2020-08-16 VITALS
RESPIRATION RATE: 19 BRPM | OXYGEN SATURATION: 94 % | TEMPERATURE: 98.24 F | SYSTOLIC BLOOD PRESSURE: 130 MMHG | HEART RATE: 70 BPM | DIASTOLIC BLOOD PRESSURE: 70 MMHG

## 2020-08-16 VITALS
RESPIRATION RATE: 14 BRPM | HEART RATE: 78 BPM | SYSTOLIC BLOOD PRESSURE: 123 MMHG | DIASTOLIC BLOOD PRESSURE: 73 MMHG | OXYGEN SATURATION: 95 % | TEMPERATURE: 98.96 F

## 2020-08-16 VITALS
TEMPERATURE: 98.06 F | OXYGEN SATURATION: 96 % | HEART RATE: 75 BPM | RESPIRATION RATE: 17 BRPM | SYSTOLIC BLOOD PRESSURE: 127 MMHG | DIASTOLIC BLOOD PRESSURE: 65 MMHG

## 2020-08-16 VITALS
SYSTOLIC BLOOD PRESSURE: 114 MMHG | HEART RATE: 72 BPM | RESPIRATION RATE: 16 BRPM | TEMPERATURE: 98.24 F | DIASTOLIC BLOOD PRESSURE: 56 MMHG | OXYGEN SATURATION: 97 %

## 2020-08-16 VITALS
TEMPERATURE: 98.1 F | DIASTOLIC BLOOD PRESSURE: 68 MMHG | RESPIRATION RATE: 19 BRPM | HEART RATE: 65 BPM | OXYGEN SATURATION: 93 % | SYSTOLIC BLOOD PRESSURE: 124 MMHG

## 2020-08-17 VITALS
OXYGEN SATURATION: 95 % | TEMPERATURE: 98.2 F | RESPIRATION RATE: 18 BRPM | DIASTOLIC BLOOD PRESSURE: 77 MMHG | HEART RATE: 80 BPM | SYSTOLIC BLOOD PRESSURE: 142 MMHG

## 2020-08-17 VITALS
DIASTOLIC BLOOD PRESSURE: 75 MMHG | TEMPERATURE: 98.6 F | RESPIRATION RATE: 18 BRPM | SYSTOLIC BLOOD PRESSURE: 150 MMHG | HEART RATE: 60 BPM | OXYGEN SATURATION: 92 %

## 2020-08-17 VITALS
RESPIRATION RATE: 16 BRPM | DIASTOLIC BLOOD PRESSURE: 82 MMHG | TEMPERATURE: 98.7 F | HEART RATE: 75 BPM | SYSTOLIC BLOOD PRESSURE: 118 MMHG | OXYGEN SATURATION: 94 %

## 2020-08-17 VITALS
TEMPERATURE: 98.2 F | RESPIRATION RATE: 18 BRPM | HEART RATE: 76 BPM | DIASTOLIC BLOOD PRESSURE: 90 MMHG | OXYGEN SATURATION: 97 % | SYSTOLIC BLOOD PRESSURE: 152 MMHG

## 2020-08-17 VITALS
SYSTOLIC BLOOD PRESSURE: 121 MMHG | HEART RATE: 70 BPM | RESPIRATION RATE: 16 BRPM | TEMPERATURE: 98.42 F | OXYGEN SATURATION: 95 % | DIASTOLIC BLOOD PRESSURE: 86 MMHG

## 2020-08-17 VITALS
TEMPERATURE: 97.52 F | SYSTOLIC BLOOD PRESSURE: 139 MMHG | OXYGEN SATURATION: 96 % | HEART RATE: 76 BPM | DIASTOLIC BLOOD PRESSURE: 76 MMHG | RESPIRATION RATE: 18 BRPM

## 2020-08-17 VITALS — OXYGEN SATURATION: 95 % | HEART RATE: 83 BPM

## 2020-08-17 VITALS
DIASTOLIC BLOOD PRESSURE: 83 MMHG | RESPIRATION RATE: 18 BRPM | TEMPERATURE: 98.06 F | HEART RATE: 74 BPM | SYSTOLIC BLOOD PRESSURE: 157 MMHG | OXYGEN SATURATION: 96 %

## 2020-08-18 VITALS
OXYGEN SATURATION: 95 % | HEART RATE: 75 BPM | RESPIRATION RATE: 19 BRPM | DIASTOLIC BLOOD PRESSURE: 75 MMHG | TEMPERATURE: 97.7 F | SYSTOLIC BLOOD PRESSURE: 133 MMHG

## 2020-08-18 VITALS
DIASTOLIC BLOOD PRESSURE: 74 MMHG | OXYGEN SATURATION: 96 % | TEMPERATURE: 98.24 F | HEART RATE: 67 BPM | SYSTOLIC BLOOD PRESSURE: 135 MMHG | RESPIRATION RATE: 16 BRPM

## 2020-08-18 LAB
ANION GAP SERPL CALC-SCNC: 3.7 MEQ/L (ref 5–15)
BUN SERPL-MCNC: 13 MG/DL (ref 9–20)
CALCIUM SPEC-MCNC: 8.1 MG/DL (ref 8.4–10.2)
CHLORIDE SPEC-SCNC: 104 MMOL/L (ref 98–107)
CO2 SERPL-SCNC: 31 MMOL/L (ref 22–30)
CREAT BLD-SCNC: 0.7 MG/DL (ref 0.66–1.25)
CREATININE CLEARANCE ESTIMATED: 128 ML/MIN (ref 50–200)
ESTIMATED GLOMERULAR FILT RATE: 121 ML/MIN (ref 60–?)
GFR (AFRICAN AMERICAN): 147 ML/MIN (ref 60–?)
GLUCOSE: 161 MG/DL (ref 74–100)
HCT VFR BLD CALC: 25 % (ref 42–52)
HGB BLD-MCNC: 8.3 G/DL (ref 14.1–18)
MCHC RBC-ENTMCNC: 33.2 G/DL (ref 31.8–35.4)
MCV RBC: 96.6 FL (ref 80–94)
MEAN CORPUSCULAR HEMOGLOBIN: 32.1 PG (ref 27–31.2)
PLATELET # BLD: 65 K/MM3 (ref 142–424)
POTASSIUM: 3.7 MMOL/L (ref 3.5–5.1)
RBC # BLD AUTO: 2.58 M/MM3 (ref 4.6–6.2)
SODIUM SPEC-SCNC: 135 MMOL/L (ref 136–145)
WBC # BLD AUTO: 1.8 K/MM3 (ref 4.8–10.8)

## 2020-08-31 ENCOUNTER — HOSPITAL ENCOUNTER (OUTPATIENT)
Age: 45
End: 2020-08-31
Payer: MEDICARE

## 2020-08-31 DIAGNOSIS — Z51.89: Primary | ICD-10-CM

## 2020-08-31 DIAGNOSIS — L97.513: ICD-10-CM

## 2020-08-31 PROCEDURE — 87077 CULTURE AEROBIC IDENTIFY: CPT

## 2020-08-31 PROCEDURE — 87205 SMEAR GRAM STAIN: CPT

## 2020-08-31 PROCEDURE — 87186 SC STD MICRODIL/AGAR DIL: CPT

## 2020-08-31 PROCEDURE — 87070 CULTURE OTHR SPECIMN AEROBIC: CPT

## 2020-09-20 ENCOUNTER — HOSPITAL ENCOUNTER (INPATIENT)
Dept: HOSPITAL 22 - ER | Age: 45
LOS: 3 days | Discharge: HOME | DRG: 571 | End: 2020-09-23
Payer: MEDICARE

## 2020-09-20 VITALS
SYSTOLIC BLOOD PRESSURE: 159 MMHG | HEART RATE: 112 BPM | OXYGEN SATURATION: 99 % | TEMPERATURE: 100.4 F | DIASTOLIC BLOOD PRESSURE: 81 MMHG | RESPIRATION RATE: 20 BRPM

## 2020-09-20 VITALS
DIASTOLIC BLOOD PRESSURE: 72 MMHG | OXYGEN SATURATION: 100 % | SYSTOLIC BLOOD PRESSURE: 144 MMHG | RESPIRATION RATE: 20 BRPM | HEART RATE: 117 BPM

## 2020-09-20 VITALS
SYSTOLIC BLOOD PRESSURE: 134 MMHG | OXYGEN SATURATION: 98 % | DIASTOLIC BLOOD PRESSURE: 74 MMHG | RESPIRATION RATE: 18 BRPM | HEART RATE: 105 BPM

## 2020-09-20 VITALS
DIASTOLIC BLOOD PRESSURE: 85 MMHG | RESPIRATION RATE: 18 BRPM | SYSTOLIC BLOOD PRESSURE: 135 MMHG | OXYGEN SATURATION: 100 % | HEART RATE: 110 BPM

## 2020-09-20 VITALS
HEART RATE: 113 BPM | RESPIRATION RATE: 20 BRPM | TEMPERATURE: 100.4 F | OXYGEN SATURATION: 99 % | SYSTOLIC BLOOD PRESSURE: 159 MMHG | DIASTOLIC BLOOD PRESSURE: 81 MMHG

## 2020-09-20 VITALS — HEART RATE: 113 BPM | RESPIRATION RATE: 20 BRPM | OXYGEN SATURATION: 99 %

## 2020-09-20 VITALS — BODY MASS INDEX: 40.3 KG/M2 | BODY MASS INDEX: 36.9 KG/M2 | BODY MASS INDEX: 40 KG/M2 | BODY MASS INDEX: 38.8 KG/M2

## 2020-09-20 VITALS
DIASTOLIC BLOOD PRESSURE: 70 MMHG | SYSTOLIC BLOOD PRESSURE: 147 MMHG | HEART RATE: 106 BPM | OXYGEN SATURATION: 97 % | RESPIRATION RATE: 19 BRPM

## 2020-09-20 VITALS
SYSTOLIC BLOOD PRESSURE: 144 MMHG | TEMPERATURE: 102.7 F | OXYGEN SATURATION: 100 % | HEART RATE: 116 BPM | RESPIRATION RATE: 19 BRPM | DIASTOLIC BLOOD PRESSURE: 72 MMHG

## 2020-09-20 VITALS — OXYGEN SATURATION: 99 % | DIASTOLIC BLOOD PRESSURE: 74 MMHG | SYSTOLIC BLOOD PRESSURE: 137 MMHG | HEART RATE: 119 BPM

## 2020-09-20 VITALS
DIASTOLIC BLOOD PRESSURE: 85 MMHG | SYSTOLIC BLOOD PRESSURE: 134 MMHG | RESPIRATION RATE: 18 BRPM | OXYGEN SATURATION: 99 % | HEART RATE: 113 BPM

## 2020-09-20 DIAGNOSIS — L02.415: ICD-10-CM

## 2020-09-20 DIAGNOSIS — B96.5: ICD-10-CM

## 2020-09-20 DIAGNOSIS — Z79.899: ICD-10-CM

## 2020-09-20 DIAGNOSIS — Z79.52: ICD-10-CM

## 2020-09-20 DIAGNOSIS — L97.525: ICD-10-CM

## 2020-09-20 DIAGNOSIS — L02.416: ICD-10-CM

## 2020-09-20 DIAGNOSIS — L03.115: ICD-10-CM

## 2020-09-20 DIAGNOSIS — L03.116: ICD-10-CM

## 2020-09-20 DIAGNOSIS — B96.89: ICD-10-CM

## 2020-09-20 DIAGNOSIS — L97.515: Primary | ICD-10-CM

## 2020-09-20 DIAGNOSIS — L97.326: ICD-10-CM

## 2020-09-20 DIAGNOSIS — I83.023: ICD-10-CM

## 2020-09-20 DIAGNOSIS — B95.2: ICD-10-CM

## 2020-09-20 LAB
ALBUMIN LEVEL: 2.7 G/DL (ref 3.5–5)
ALBUMIN/GLOB SERPL: 0.7 {RATIO} (ref 1.1–1.8)
ALP ISO SERPL-ACNC: 193 U/L (ref 38–126)
ALT SERPLBLD-CCNC: 33 U/L (ref 12–78)
ANION GAP SERPL CALC-SCNC: 2.8 MEQ/L (ref 5–15)
AST SERPL QL: 49 U/L (ref 17–59)
BILIRUBIN,TOTAL: 2 MG/DL (ref 0.2–1.3)
BUN SERPL-MCNC: 9 MG/DL (ref 9–20)
CALCIUM SPEC-MCNC: 8.3 MG/DL (ref 8.4–10.2)
CELLS COUNTED: 100
CHLORIDE SPEC-SCNC: 104 MMOL/L (ref 98–107)
CO2 SERPL-SCNC: 29 MMOL/L (ref 22–30)
CREAT BLD-SCNC: 0.6 MG/DL (ref 0.66–1.25)
CREATININE CLEARANCE ESTIMATED: 244 ML/MIN (ref 50–200)
CRP SERPL HS-MCNC: 71.7 MG/L (ref 0–4)
ESTIMATED GLOMERULAR FILT RATE: 144 ML/MIN (ref 60–?)
GFR (AFRICAN AMERICAN): 175 ML/MIN (ref 60–?)
GLOBULIN SER CALC-MCNC: 3.9 G/DL (ref 1.3–3.2)
GLUCOSE: 140 MG/DL (ref 74–100)
HCT VFR BLD CALC: 28.8 % (ref 42–52)
HGB BLD-MCNC: 9.5 G/DL (ref 14.1–18)
LACTATE SERPL-MCNC: 1.1 MMOL/L (ref 0.7–2.1)
MANUAL DIFFERENTIAL: (no result)
MCHC RBC-ENTMCNC: 33.2 G/DL (ref 31.8–35.4)
MCV RBC: 94.9 FL (ref 80–94)
MEAN CORPUSCULAR HEMOGLOBIN: 31.5 PG (ref 27–31.2)
PLATELET # BLD: 87 K/MM3 (ref 142–424)
POTASSIUM: 3.8 MMOL/L (ref 3.5–5.1)
PROT SERPL-MCNC: 6.6 G/DL (ref 6.3–8.2)
RBC # BLD AUTO: 3.03 M/MM3 (ref 4.6–6.2)
REFLEX LACTIC: (no result)
SODIUM SPEC-SCNC: 132 MMOL/L (ref 136–145)
WBC # BLD AUTO: 7 K/MM3 (ref 4.8–10.8)

## 2020-09-20 PROCEDURE — 80202 ASSAY OF VANCOMYCIN: CPT

## 2020-09-20 PROCEDURE — 97162 PT EVAL MOD COMPLEX 30 MIN: CPT

## 2020-09-20 PROCEDURE — 87040 BLOOD CULTURE FOR BACTERIA: CPT

## 2020-09-20 PROCEDURE — 71045 X-RAY EXAM CHEST 1 VIEW: CPT

## 2020-09-20 PROCEDURE — 84443 ASSAY THYROID STIM HORMONE: CPT

## 2020-09-20 PROCEDURE — 73610 X-RAY EXAM OF ANKLE: CPT

## 2020-09-20 PROCEDURE — 11042 DBRDMT SUBQ TIS 1ST 20SQCM/<: CPT

## 2020-09-20 PROCEDURE — 87070 CULTURE OTHR SPECIMN AEROBIC: CPT

## 2020-09-20 PROCEDURE — 85651 RBC SED RATE NONAUTOMATED: CPT

## 2020-09-20 PROCEDURE — 87077 CULTURE AEROBIC IDENTIFY: CPT

## 2020-09-20 PROCEDURE — 87205 SMEAR GRAM STAIN: CPT

## 2020-09-20 PROCEDURE — 87075 CULTR BACTERIA EXCEPT BLOOD: CPT

## 2020-09-20 PROCEDURE — 86328 IA NFCT AB SARSCOV2 COVID19: CPT

## 2020-09-20 PROCEDURE — 20245 BONE BIOPSY OPEN DEEP: CPT

## 2020-09-20 PROCEDURE — 99284 EMERGENCY DEPT VISIT MOD MDM: CPT

## 2020-09-20 PROCEDURE — 97530 THERAPEUTIC ACTIVITIES: CPT

## 2020-09-20 PROCEDURE — 80053 COMPREHEN METABOLIC PANEL: CPT

## 2020-09-20 PROCEDURE — 83605 ASSAY OF LACTIC ACID: CPT

## 2020-09-20 PROCEDURE — 87081 CULTURE SCREEN ONLY: CPT

## 2020-09-20 PROCEDURE — 85025 COMPLETE CBC W/AUTO DIFF WBC: CPT

## 2020-09-20 PROCEDURE — 96367 TX/PROPH/DG ADDL SEQ IV INF: CPT

## 2020-09-20 PROCEDURE — 73630 X-RAY EXAM OF FOOT: CPT

## 2020-09-20 PROCEDURE — 96365 THER/PROPH/DIAG IV INF INIT: CPT

## 2020-09-20 PROCEDURE — 83036 HEMOGLOBIN GLYCOSYLATED A1C: CPT

## 2020-09-20 PROCEDURE — 93005 ELECTROCARDIOGRAM TRACING: CPT

## 2020-09-20 PROCEDURE — 87186 SC STD MICRODIL/AGAR DIL: CPT

## 2020-09-20 PROCEDURE — 85007 BL SMEAR W/DIFF WBC COUNT: CPT

## 2020-09-20 PROCEDURE — 11719 TRIM NAIL(S) ANY NUMBER: CPT

## 2020-09-20 PROCEDURE — 97165 OT EVAL LOW COMPLEX 30 MIN: CPT

## 2020-09-20 PROCEDURE — 86140 C-REACTIVE PROTEIN: CPT

## 2020-09-20 PROCEDURE — 88311 DECALCIFY TISSUE: CPT

## 2020-09-20 PROCEDURE — 88307 TISSUE EXAM BY PATHOLOGIST: CPT

## 2020-09-20 PROCEDURE — 36415 COLL VENOUS BLD VENIPUNCTURE: CPT

## 2020-09-20 PROCEDURE — 11045 DBRDMT SUBQ TISS EACH ADDL: CPT

## 2020-09-20 PROCEDURE — 96375 TX/PRO/DX INJ NEW DRUG ADDON: CPT

## 2020-09-21 VITALS
HEART RATE: 92 BPM | TEMPERATURE: 98 F | RESPIRATION RATE: 18 BRPM | SYSTOLIC BLOOD PRESSURE: 143 MMHG | OXYGEN SATURATION: 99 % | DIASTOLIC BLOOD PRESSURE: 47 MMHG

## 2020-09-21 VITALS
OXYGEN SATURATION: 98 % | HEART RATE: 80 BPM | TEMPERATURE: 98.3 F | SYSTOLIC BLOOD PRESSURE: 134 MMHG | RESPIRATION RATE: 20 BRPM | DIASTOLIC BLOOD PRESSURE: 74 MMHG

## 2020-09-21 VITALS
DIASTOLIC BLOOD PRESSURE: 86 MMHG | RESPIRATION RATE: 18 BRPM | SYSTOLIC BLOOD PRESSURE: 158 MMHG | OXYGEN SATURATION: 99 % | HEART RATE: 79 BPM

## 2020-09-21 VITALS
SYSTOLIC BLOOD PRESSURE: 146 MMHG | HEART RATE: 83 BPM | DIASTOLIC BLOOD PRESSURE: 83 MMHG | RESPIRATION RATE: 18 BRPM | TEMPERATURE: 98.24 F | OXYGEN SATURATION: 99 %

## 2020-09-21 VITALS
DIASTOLIC BLOOD PRESSURE: 65 MMHG | RESPIRATION RATE: 20 BRPM | HEART RATE: 82 BPM | OXYGEN SATURATION: 98 % | SYSTOLIC BLOOD PRESSURE: 140 MMHG | TEMPERATURE: 98.1 F

## 2020-09-21 VITALS
HEART RATE: 81 BPM | DIASTOLIC BLOOD PRESSURE: 85 MMHG | OXYGEN SATURATION: 100 % | SYSTOLIC BLOOD PRESSURE: 149 MMHG | RESPIRATION RATE: 18 BRPM

## 2020-09-21 VITALS — DIASTOLIC BLOOD PRESSURE: 83 MMHG | HEART RATE: 83 BPM | TEMPERATURE: 98.24 F | SYSTOLIC BLOOD PRESSURE: 146 MMHG

## 2020-09-21 VITALS
HEART RATE: 93 BPM | OXYGEN SATURATION: 98 % | TEMPERATURE: 98.3 F | RESPIRATION RATE: 16 BRPM | DIASTOLIC BLOOD PRESSURE: 83 MMHG | SYSTOLIC BLOOD PRESSURE: 153 MMHG

## 2020-09-21 VITALS
RESPIRATION RATE: 18 BRPM | HEART RATE: 81 BPM | TEMPERATURE: 98.24 F | OXYGEN SATURATION: 99 % | DIASTOLIC BLOOD PRESSURE: 80 MMHG | SYSTOLIC BLOOD PRESSURE: 144 MMHG

## 2020-09-21 VITALS
HEART RATE: 84 BPM | RESPIRATION RATE: 18 BRPM | DIASTOLIC BLOOD PRESSURE: 72 MMHG | SYSTOLIC BLOOD PRESSURE: 142 MMHG | OXYGEN SATURATION: 99 %

## 2020-09-21 VITALS
HEART RATE: 70 BPM | SYSTOLIC BLOOD PRESSURE: 122 MMHG | RESPIRATION RATE: 18 BRPM | TEMPERATURE: 98.7 F | OXYGEN SATURATION: 98 % | DIASTOLIC BLOOD PRESSURE: 62 MMHG

## 2020-09-21 VITALS
SYSTOLIC BLOOD PRESSURE: 143 MMHG | TEMPERATURE: 98.06 F | OXYGEN SATURATION: 98 % | DIASTOLIC BLOOD PRESSURE: 47 MMHG | HEART RATE: 87 BPM | RESPIRATION RATE: 20 BRPM

## 2020-09-21 VITALS
HEART RATE: 75 BPM | SYSTOLIC BLOOD PRESSURE: 130 MMHG | TEMPERATURE: 97.9 F | RESPIRATION RATE: 18 BRPM | DIASTOLIC BLOOD PRESSURE: 60 MMHG | OXYGEN SATURATION: 99 %

## 2020-09-21 VITALS — RESPIRATION RATE: 18 BRPM

## 2020-09-21 VITALS
OXYGEN SATURATION: 98 % | TEMPERATURE: 98.3 F | DIASTOLIC BLOOD PRESSURE: 81 MMHG | RESPIRATION RATE: 18 BRPM | SYSTOLIC BLOOD PRESSURE: 140 MMHG | HEART RATE: 101 BPM

## 2020-09-21 VITALS
RESPIRATION RATE: 16 BRPM | DIASTOLIC BLOOD PRESSURE: 67 MMHG | SYSTOLIC BLOOD PRESSURE: 120 MMHG | HEART RATE: 76 BPM | TEMPERATURE: 97.7 F | OXYGEN SATURATION: 98 %

## 2020-09-21 VITALS
HEART RATE: 77 BPM | DIASTOLIC BLOOD PRESSURE: 74 MMHG | SYSTOLIC BLOOD PRESSURE: 131 MMHG | OXYGEN SATURATION: 99 % | RESPIRATION RATE: 18 BRPM | TEMPERATURE: 97.9 F

## 2020-09-21 VITALS
OXYGEN SATURATION: 97 % | DIASTOLIC BLOOD PRESSURE: 69 MMHG | RESPIRATION RATE: 18 BRPM | HEART RATE: 74 BPM | SYSTOLIC BLOOD PRESSURE: 128 MMHG | TEMPERATURE: 98.2 F

## 2020-09-21 VITALS
TEMPERATURE: 98.24 F | HEART RATE: 79 BPM | RESPIRATION RATE: 16 BRPM | SYSTOLIC BLOOD PRESSURE: 140 MMHG | DIASTOLIC BLOOD PRESSURE: 82 MMHG | OXYGEN SATURATION: 98 %

## 2020-09-21 VITALS
RESPIRATION RATE: 20 BRPM | OXYGEN SATURATION: 98 % | HEART RATE: 78 BPM | SYSTOLIC BLOOD PRESSURE: 136 MMHG | TEMPERATURE: 98.2 F | DIASTOLIC BLOOD PRESSURE: 70 MMHG

## 2020-09-21 VITALS
SYSTOLIC BLOOD PRESSURE: 146 MMHG | TEMPERATURE: 98.1 F | DIASTOLIC BLOOD PRESSURE: 80 MMHG | RESPIRATION RATE: 20 BRPM | OXYGEN SATURATION: 97 % | HEART RATE: 83 BPM

## 2020-09-21 LAB
HBA1C MFR BLD: 4.6 % (ref 4–6)
TSH SERPL-ACNC: 0.63 UIU/ML (ref 0.47–4.68)
VANCOMYCIN TROUGH SERPL-MCNC: 17.7 UG/ML (ref 5–10)

## 2020-09-21 PROCEDURE — 0JBQ0ZZ EXCISION OF RIGHT FOOT SUBCUTANEOUS TISSUE AND FASCIA, OPEN APPROACH: ICD-10-PCS | Performed by: PODIATRIST

## 2020-09-22 VITALS
SYSTOLIC BLOOD PRESSURE: 137 MMHG | OXYGEN SATURATION: 99 % | DIASTOLIC BLOOD PRESSURE: 78 MMHG | HEART RATE: 74 BPM | TEMPERATURE: 97.8 F | RESPIRATION RATE: 18 BRPM

## 2020-09-22 VITALS
HEART RATE: 75 BPM | OXYGEN SATURATION: 99 % | SYSTOLIC BLOOD PRESSURE: 136 MMHG | RESPIRATION RATE: 18 BRPM | DIASTOLIC BLOOD PRESSURE: 76 MMHG | TEMPERATURE: 98 F

## 2020-09-22 VITALS
DIASTOLIC BLOOD PRESSURE: 58 MMHG | RESPIRATION RATE: 20 BRPM | TEMPERATURE: 97.88 F | HEART RATE: 72 BPM | SYSTOLIC BLOOD PRESSURE: 130 MMHG | OXYGEN SATURATION: 99 %

## 2020-09-22 VITALS
HEART RATE: 70 BPM | RESPIRATION RATE: 17 BRPM | DIASTOLIC BLOOD PRESSURE: 68 MMHG | TEMPERATURE: 98.1 F | OXYGEN SATURATION: 99 % | SYSTOLIC BLOOD PRESSURE: 134 MMHG

## 2020-09-22 VITALS
TEMPERATURE: 97.88 F | HEART RATE: 69 BPM | DIASTOLIC BLOOD PRESSURE: 62 MMHG | RESPIRATION RATE: 19 BRPM | OXYGEN SATURATION: 99 % | SYSTOLIC BLOOD PRESSURE: 127 MMHG

## 2020-09-22 VITALS
DIASTOLIC BLOOD PRESSURE: 73 MMHG | TEMPERATURE: 98 F | RESPIRATION RATE: 20 BRPM | SYSTOLIC BLOOD PRESSURE: 124 MMHG | HEART RATE: 80 BPM | OXYGEN SATURATION: 98 %

## 2020-09-22 LAB
ALBUMIN LEVEL: 2.1 G/DL (ref 3.5–5)
ALBUMIN/GLOB SERPL: 0.6 {RATIO} (ref 1.1–1.8)
ALP ISO SERPL-ACNC: 137 U/L (ref 38–126)
ALT SERPLBLD-CCNC: 22 U/L (ref 12–78)
ANION GAP SERPL CALC-SCNC: 6.4 MEQ/L (ref 5–15)
AST SERPL QL: 28 U/L (ref 17–59)
BILIRUBIN,TOTAL: 0.9 MG/DL (ref 0.2–1.3)
BUN SERPL-MCNC: 14 MG/DL (ref 9–20)
CALCIUM SPEC-MCNC: 7.6 MG/DL (ref 8.4–10.2)
CELLS COUNTED: 100
CHLORIDE SPEC-SCNC: 105 MMOL/L (ref 98–107)
CO2 SERPL-SCNC: 26 MMOL/L (ref 22–30)
CREAT BLD-SCNC: 0.6 MG/DL (ref 0.66–1.25)
CREATININE CLEARANCE ESTIMATED: 147 ML/MIN (ref 50–200)
CRP SERPL HS-MCNC: 100.9 MG/L (ref 0–4)
ESTIMATED GLOMERULAR FILT RATE: 144 ML/MIN (ref 60–?)
GFR (AFRICAN AMERICAN): 175 ML/MIN (ref 60–?)
GLOBULIN SER CALC-MCNC: 3.5 G/DL (ref 1.3–3.2)
GLUCOSE: 160 MG/DL (ref 74–100)
HCT VFR BLD CALC: 27.1 % (ref 42–52)
HGB BLD-MCNC: 9.2 G/DL (ref 14.1–18)
MANUAL DIFFERENTIAL: (no result)
MCHC RBC-ENTMCNC: 33.8 G/DL (ref 31.8–35.4)
MCV RBC: 93.6 FL (ref 80–94)
MEAN CORPUSCULAR HEMOGLOBIN: 31.6 PG (ref 27–31.2)
PLATELET # BLD: 97 K/MM3 (ref 142–424)
POTASSIUM: 4.4 MMOL/L (ref 3.5–5.1)
PROT SERPL-MCNC: 5.6 G/DL (ref 6.3–8.2)
RBC # BLD AUTO: 2.9 M/MM3 (ref 4.6–6.2)
SODIUM SPEC-SCNC: 133 MMOL/L (ref 136–145)
WBC # BLD AUTO: 6.9 K/MM3 (ref 4.8–10.8)

## 2020-09-23 VITALS
OXYGEN SATURATION: 100 % | RESPIRATION RATE: 18 BRPM | DIASTOLIC BLOOD PRESSURE: 73 MMHG | HEART RATE: 74 BPM | SYSTOLIC BLOOD PRESSURE: 155 MMHG | TEMPERATURE: 97.8 F

## 2020-09-23 VITALS
HEART RATE: 69 BPM | OXYGEN SATURATION: 99 % | RESPIRATION RATE: 18 BRPM | SYSTOLIC BLOOD PRESSURE: 139 MMHG | TEMPERATURE: 98.06 F | DIASTOLIC BLOOD PRESSURE: 83 MMHG

## 2020-09-23 LAB
ALBUMIN LEVEL: 2.4 G/DL (ref 3.5–5)
ALBUMIN/GLOB SERPL: 0.7 {RATIO} (ref 1.1–1.8)
ALP ISO SERPL-ACNC: 159 U/L (ref 38–126)
ALT SERPLBLD-CCNC: 22 U/L (ref 12–78)
ANION GAP SERPL CALC-SCNC: 6.8 MEQ/L (ref 5–15)
AST SERPL QL: 29 U/L (ref 17–59)
BILIRUBIN,TOTAL: 0.7 MG/DL (ref 0.2–1.3)
BUN SERPL-MCNC: 14 MG/DL (ref 9–20)
CALCIUM SPEC-MCNC: 7.9 MG/DL (ref 8.4–10.2)
CHLORIDE SPEC-SCNC: 107 MMOL/L (ref 98–107)
CO2 SERPL-SCNC: 26 MMOL/L (ref 22–30)
CREAT BLD-SCNC: 0.6 MG/DL (ref 0.66–1.25)
CREATININE CLEARANCE ESTIMATED: 264 ML/MIN (ref 50–200)
ESTIMATED GLOMERULAR FILT RATE: 144 ML/MIN (ref 60–?)
GFR (AFRICAN AMERICAN): 175 ML/MIN (ref 60–?)
GLOBULIN SER CALC-MCNC: 3.5 G/DL (ref 1.3–3.2)
GLUCOSE: 123 MG/DL (ref 74–100)
HCT VFR BLD CALC: 30.3 % (ref 42–52)
HGB BLD-MCNC: 9.8 G/DL (ref 14.1–18)
MCHC RBC-ENTMCNC: 32.2 G/DL (ref 31.8–35.4)
MCV RBC: 96.9 FL (ref 80–94)
MEAN CORPUSCULAR HEMOGLOBIN: 31.2 PG (ref 27–31.2)
PLATELET # BLD: 142 K/MM3 (ref 142–424)
POTASSIUM: 3.8 MMOL/L (ref 3.5–5.1)
PROT SERPL-MCNC: 5.9 G/DL (ref 6.3–8.2)
RBC # BLD AUTO: 3.13 M/MM3 (ref 4.6–6.2)
SODIUM SPEC-SCNC: 136 MMOL/L (ref 136–145)
VANCOMYCIN TROUGH SERPL-MCNC: 24 UG/ML (ref 5–10)
WBC # BLD AUTO: 7.5 K/MM3 (ref 4.8–10.8)

## 2020-10-26 ENCOUNTER — HOSPITAL ENCOUNTER (OUTPATIENT)
Age: 45
End: 2020-10-26
Payer: MEDICARE

## 2020-10-26 DIAGNOSIS — Z16.24: ICD-10-CM

## 2020-10-26 DIAGNOSIS — L02.611: ICD-10-CM

## 2020-10-26 DIAGNOSIS — A49.8: Primary | ICD-10-CM

## 2020-10-26 PROCEDURE — 87070 CULTURE OTHR SPECIMN AEROBIC: CPT

## 2020-10-26 PROCEDURE — 87077 CULTURE AEROBIC IDENTIFY: CPT

## 2020-10-26 PROCEDURE — 87186 SC STD MICRODIL/AGAR DIL: CPT

## 2020-10-26 PROCEDURE — 87205 SMEAR GRAM STAIN: CPT

## 2020-10-29 ENCOUNTER — HOSPITAL ENCOUNTER (EMERGENCY)
Age: 45
Discharge: HOME | End: 2020-10-29
Payer: MEDICARE

## 2020-10-29 VITALS
OXYGEN SATURATION: 98 % | SYSTOLIC BLOOD PRESSURE: 154 MMHG | RESPIRATION RATE: 16 BRPM | HEART RATE: 74 BPM | TEMPERATURE: 98 F | DIASTOLIC BLOOD PRESSURE: 74 MMHG

## 2020-10-29 VITALS — BODY MASS INDEX: 34 KG/M2

## 2020-10-29 VITALS
TEMPERATURE: 98.2 F | OXYGEN SATURATION: 98 % | HEART RATE: 101 BPM | DIASTOLIC BLOOD PRESSURE: 92 MMHG | SYSTOLIC BLOOD PRESSURE: 151 MMHG | RESPIRATION RATE: 16 BRPM

## 2020-10-29 DIAGNOSIS — L97.901: Primary | ICD-10-CM

## 2020-10-29 DIAGNOSIS — J44.9: ICD-10-CM

## 2020-10-29 DIAGNOSIS — F17.210: ICD-10-CM

## 2020-10-29 DIAGNOSIS — I87.2: ICD-10-CM

## 2020-10-29 DIAGNOSIS — I10: ICD-10-CM

## 2020-10-29 DIAGNOSIS — G89.4: ICD-10-CM

## 2020-10-29 LAB
ANION GAP SERPL CALC-SCNC: 8.5 MEQ/L (ref 5–15)
BUN SERPL-MCNC: 10 MG/DL (ref 9–20)
CALCIUM SPEC-MCNC: 8.4 MG/DL (ref 8.4–10.2)
CHLORIDE SPEC-SCNC: 107 MMOL/L (ref 98–107)
CO2 SERPL-SCNC: 26 MMOL/L (ref 22–30)
CREAT BLD-SCNC: 0.6 MG/DL (ref 0.66–1.25)
CREATININE CLEARANCE ESTIMATED: 225 ML/MIN (ref 50–200)
ESTIMATED GLOMERULAR FILT RATE: 144 ML/MIN (ref 60–?)
GFR (AFRICAN AMERICAN): 175 ML/MIN (ref 60–?)
GLUCOSE: 124 MG/DL (ref 74–100)
HCT VFR BLD CALC: 31 % (ref 42–52)
HGB BLD-MCNC: 10.3 G/DL (ref 14.1–18)
MCHC RBC-ENTMCNC: 33.2 G/DL (ref 31.8–35.4)
MCV RBC: 92.9 FL (ref 80–94)
MEAN CORPUSCULAR HEMOGLOBIN: 30.9 PG (ref 27–31.2)
PLATELET # BLD: 87 K/MM3 (ref 142–424)
POTASSIUM: 3.5 MMOL/L (ref 3.5–5.1)
RBC # BLD AUTO: 3.34 M/MM3 (ref 4.6–6.2)
SODIUM SPEC-SCNC: 138 MMOL/L (ref 136–145)
WBC # BLD AUTO: 3 K/MM3 (ref 4.8–10.8)

## 2020-10-29 PROCEDURE — 85025 COMPLETE CBC W/AUTO DIFF WBC: CPT

## 2020-10-29 PROCEDURE — 99282 EMERGENCY DEPT VISIT SF MDM: CPT

## 2020-10-29 PROCEDURE — 80048 BASIC METABOLIC PNL TOTAL CA: CPT

## 2020-11-09 ENCOUNTER — HOSPITAL ENCOUNTER (OUTPATIENT)
Age: 45
End: 2020-11-09
Payer: MEDICARE

## 2020-11-09 VITALS
OXYGEN SATURATION: 100 % | SYSTOLIC BLOOD PRESSURE: 158 MMHG | DIASTOLIC BLOOD PRESSURE: 94 MMHG | RESPIRATION RATE: 20 BRPM | TEMPERATURE: 98.06 F | HEART RATE: 97 BPM

## 2020-11-09 VITALS — BODY MASS INDEX: 36.9 KG/M2

## 2020-11-09 DIAGNOSIS — G89.4: Primary | ICD-10-CM

## 2020-11-09 PROCEDURE — 99202 OFFICE O/P NEW SF 15 MIN: CPT

## 2020-11-17 ENCOUNTER — HOSPITAL ENCOUNTER (OUTPATIENT)
Age: 45
End: 2020-11-17
Payer: MEDICARE

## 2020-11-17 DIAGNOSIS — R89.5: Primary | ICD-10-CM

## 2020-11-17 PROCEDURE — 87205 SMEAR GRAM STAIN: CPT

## 2020-11-17 PROCEDURE — 87077 CULTURE AEROBIC IDENTIFY: CPT

## 2020-11-17 PROCEDURE — 87070 CULTURE OTHR SPECIMN AEROBIC: CPT

## 2020-11-17 PROCEDURE — 87186 SC STD MICRODIL/AGAR DIL: CPT

## 2021-01-26 ENCOUNTER — HOSPITAL ENCOUNTER (OUTPATIENT)
Age: 46
End: 2021-01-26
Payer: MEDICARE

## 2021-01-26 DIAGNOSIS — L97.922: ICD-10-CM

## 2021-01-26 DIAGNOSIS — L97.513: Primary | ICD-10-CM

## 2021-01-26 LAB
ALBUMIN LEVEL: 3.3 G/DL (ref 3.5–5)
ALBUMIN/GLOB SERPL: 0.8 {RATIO} (ref 1.1–1.8)
ALP ISO SERPL-ACNC: 270 U/L (ref 38–126)
ALT SERPLBLD-CCNC: 43 U/L (ref 12–78)
ANION GAP SERPL CALC-SCNC: 8.8 MEQ/L (ref 5–15)
AST SERPL QL: 69 U/L (ref 17–59)
BILIRUBIN,TOTAL: 1.4 MG/DL (ref 0.2–1.3)
BUN SERPL-MCNC: 16 MG/DL (ref 9–20)
CALCIUM SPEC-MCNC: 9.2 MG/DL (ref 8.4–10.2)
CHLORIDE SPEC-SCNC: 109 MMOL/L (ref 98–107)
CO2 SERPL-SCNC: 25 MMOL/L (ref 22–30)
CREAT BLD-SCNC: 0.7 MG/DL (ref 0.66–1.25)
CRP SERPL HS-MCNC: 2.6 MG/L (ref 0–4)
ESTIMATED GLOMERULAR FILT RATE: 121 ML/MIN (ref 60–?)
GFR (AFRICAN AMERICAN): 146 ML/MIN (ref 60–?)
GLOBULIN SER CALC-MCNC: 3.9 G/DL (ref 1.3–3.2)
GLUCOSE: 89 MG/DL (ref 74–100)
HCT VFR BLD CALC: 36.8 % (ref 42–52)
HGB BLD-MCNC: 12.2 G/DL (ref 14.1–18)
MCHC RBC-ENTMCNC: 33.2 G/DL (ref 31.8–35.4)
MCV RBC: 94.8 FL (ref 80–94)
MEAN CORPUSCULAR HEMOGLOBIN: 31.5 PG (ref 27–31.2)
PLATELET # BLD: 94 K/MM3 (ref 142–424)
POTASSIUM: 3.8 MMOL/L (ref 3.5–5.1)
PROT SERPL-MCNC: 7.2 G/DL (ref 6.3–8.2)
RBC # BLD AUTO: 3.88 M/MM3 (ref 4.6–6.2)
SODIUM SPEC-SCNC: 139 MMOL/L (ref 136–145)
WBC # BLD AUTO: 4.3 K/MM3 (ref 4.8–10.8)

## 2021-01-26 PROCEDURE — 85651 RBC SED RATE NONAUTOMATED: CPT

## 2021-01-26 PROCEDURE — 36415 COLL VENOUS BLD VENIPUNCTURE: CPT

## 2021-01-26 PROCEDURE — 80053 COMPREHEN METABOLIC PANEL: CPT

## 2021-01-26 PROCEDURE — 85025 COMPLETE CBC W/AUTO DIFF WBC: CPT

## 2021-01-26 PROCEDURE — 86140 C-REACTIVE PROTEIN: CPT

## 2021-02-09 ENCOUNTER — HOSPITAL ENCOUNTER (OUTPATIENT)
Age: 46
End: 2021-02-09
Payer: MEDICARE

## 2021-02-09 DIAGNOSIS — L97.929: Primary | ICD-10-CM

## 2021-02-09 PROCEDURE — 87070 CULTURE OTHR SPECIMN AEROBIC: CPT

## 2021-02-09 PROCEDURE — 87205 SMEAR GRAM STAIN: CPT

## 2021-02-09 PROCEDURE — 87186 SC STD MICRODIL/AGAR DIL: CPT

## 2021-02-09 PROCEDURE — 87077 CULTURE AEROBIC IDENTIFY: CPT

## 2021-03-01 ENCOUNTER — HOSPITAL ENCOUNTER (OUTPATIENT)
Age: 46
End: 2021-03-01
Payer: MEDICARE

## 2021-03-01 DIAGNOSIS — Z22.322: ICD-10-CM

## 2021-03-01 DIAGNOSIS — L97.925: ICD-10-CM

## 2021-03-01 DIAGNOSIS — Z51.89: Primary | ICD-10-CM

## 2021-03-01 DIAGNOSIS — L97.513: ICD-10-CM

## 2021-03-01 PROCEDURE — 87077 CULTURE AEROBIC IDENTIFY: CPT

## 2021-03-01 PROCEDURE — 87186 SC STD MICRODIL/AGAR DIL: CPT

## 2021-03-01 PROCEDURE — 87205 SMEAR GRAM STAIN: CPT

## 2021-03-01 PROCEDURE — 87070 CULTURE OTHR SPECIMN AEROBIC: CPT

## 2021-03-05 ENCOUNTER — HOSPITAL ENCOUNTER (INPATIENT)
Dept: HOSPITAL 22 - ER | Age: 46
LOS: 4 days | Discharge: HOME HEALTH | DRG: 300 | End: 2021-03-09
Payer: MEDICARE

## 2021-03-05 VITALS
OXYGEN SATURATION: 99 % | SYSTOLIC BLOOD PRESSURE: 144 MMHG | DIASTOLIC BLOOD PRESSURE: 82 MMHG | TEMPERATURE: 97.7 F | RESPIRATION RATE: 18 BRPM | HEART RATE: 82 BPM

## 2021-03-05 VITALS
OXYGEN SATURATION: 99 % | HEART RATE: 96 BPM | DIASTOLIC BLOOD PRESSURE: 102 MMHG | TEMPERATURE: 98.42 F | SYSTOLIC BLOOD PRESSURE: 160 MMHG | RESPIRATION RATE: 17 BRPM

## 2021-03-05 VITALS
HEART RATE: 99 BPM | TEMPERATURE: 97.7 F | RESPIRATION RATE: 16 BRPM | OXYGEN SATURATION: 98 % | DIASTOLIC BLOOD PRESSURE: 95 MMHG | SYSTOLIC BLOOD PRESSURE: 162 MMHG

## 2021-03-05 VITALS — BODY MASS INDEX: 39.9 KG/M2 | BODY MASS INDEX: 41.4 KG/M2

## 2021-03-05 VITALS
RESPIRATION RATE: 20 BRPM | OXYGEN SATURATION: 100 % | SYSTOLIC BLOOD PRESSURE: 147 MMHG | DIASTOLIC BLOOD PRESSURE: 81 MMHG | HEART RATE: 87 BPM

## 2021-03-05 DIAGNOSIS — J44.9: ICD-10-CM

## 2021-03-05 DIAGNOSIS — L02.416: ICD-10-CM

## 2021-03-05 DIAGNOSIS — Z88.8: ICD-10-CM

## 2021-03-05 DIAGNOSIS — E66.9: ICD-10-CM

## 2021-03-05 DIAGNOSIS — I83.029: Primary | ICD-10-CM

## 2021-03-05 DIAGNOSIS — I10: ICD-10-CM

## 2021-03-05 DIAGNOSIS — L97.515: ICD-10-CM

## 2021-03-05 DIAGNOSIS — L02.611: ICD-10-CM

## 2021-03-05 DIAGNOSIS — Z88.5: ICD-10-CM

## 2021-03-05 DIAGNOSIS — L03.116: ICD-10-CM

## 2021-03-05 DIAGNOSIS — Z79.899: ICD-10-CM

## 2021-03-05 DIAGNOSIS — I87.8: ICD-10-CM

## 2021-03-05 LAB
ALBUMIN LEVEL: 3.1 G/DL (ref 3.5–5)
ALBUMIN/GLOB SERPL: 0.8 {RATIO} (ref 1.1–1.8)
ALP ISO SERPL-ACNC: 212 U/L (ref 38–126)
ALT SERPLBLD-CCNC: 62 U/L (ref 12–78)
ANION GAP SERPL CALC-SCNC: 5.6 MEQ/L (ref 5–15)
APTT PPP: 24.7 SECONDS (ref 23.6–34)
AST SERPL QL: 95 U/L (ref 17–59)
BILIRUBIN,TOTAL: 1.5 MG/DL (ref 0.2–1.3)
BUN SERPL-MCNC: 9 MG/DL (ref 9–20)
CALCIUM SPEC-MCNC: 8.8 MG/DL (ref 8.4–10.2)
CHLORIDE SPEC-SCNC: 109 MMOL/L (ref 98–107)
CO2 SERPL-SCNC: 29 MMOL/L (ref 22–30)
CREAT BLD-SCNC: 0.6 MG/DL (ref 0.66–1.25)
CREATININE CLEARANCE ESTIMATED: 264 ML/MIN (ref 50–200)
ESTIMATED GLOMERULAR FILT RATE: 144 ML/MIN (ref 60–?)
GFR (AFRICAN AMERICAN): 175 ML/MIN (ref 60–?)
GLOBULIN SER CALC-MCNC: 3.7 G/DL (ref 1.3–3.2)
GLUCOSE: 75 MG/DL (ref 74–100)
HCT VFR BLD CALC: 34.8 % (ref 42–52)
HGB BLD-MCNC: 11.4 G/DL (ref 14.1–18)
INR PPP: 1.11 (ref 0.9–1.1)
LACTATE SERPL-MCNC: 1.7 MMOL/L (ref 0.7–2.1)
MCHC RBC-ENTMCNC: 32.9 G/DL (ref 31.8–35.4)
MCV RBC: 95.4 FL (ref 80–94)
MEAN CORPUSCULAR HEMOGLOBIN: 31.3 PG (ref 27–31.2)
NT PRO BRAIN NATRIURETIC PEP.: 79.9 PG/ML (ref 0–125)
PLATELET # BLD: 67 K/MM3 (ref 142–424)
POTASSIUM: 3.6 MMOL/L (ref 3.5–5.1)
PROT SERPL-MCNC: 6.8 G/DL (ref 6.3–8.2)
PT BLD: 13 SECONDS (ref 9.4–11.8)
RBC # BLD AUTO: 3.64 M/MM3 (ref 4.6–6.2)
REFLEX LACTIC: (no result)
SODIUM SPEC-SCNC: 140 MMOL/L (ref 136–145)
WBC # BLD AUTO: 2.3 K/MM3 (ref 4.8–10.8)

## 2021-03-05 PROCEDURE — 83605 ASSAY OF LACTIC ACID: CPT

## 2021-03-05 PROCEDURE — 85730 THROMBOPLASTIN TIME PARTIAL: CPT

## 2021-03-05 PROCEDURE — 73630 X-RAY EXAM OF FOOT: CPT

## 2021-03-05 PROCEDURE — 87081 CULTURE SCREEN ONLY: CPT

## 2021-03-05 PROCEDURE — 96376 TX/PRO/DX INJ SAME DRUG ADON: CPT

## 2021-03-05 PROCEDURE — 86140 C-REACTIVE PROTEIN: CPT

## 2021-03-05 PROCEDURE — 80048 BASIC METABOLIC PNL TOTAL CA: CPT

## 2021-03-05 PROCEDURE — 96365 THER/PROPH/DIAG IV INF INIT: CPT

## 2021-03-05 PROCEDURE — 85025 COMPLETE CBC W/AUTO DIFF WBC: CPT

## 2021-03-05 PROCEDURE — 96375 TX/PRO/DX INJ NEW DRUG ADDON: CPT

## 2021-03-05 PROCEDURE — 85610 PROTHROMBIN TIME: CPT

## 2021-03-05 PROCEDURE — 94640 AIRWAY INHALATION TREATMENT: CPT

## 2021-03-05 PROCEDURE — 85651 RBC SED RATE NONAUTOMATED: CPT

## 2021-03-05 PROCEDURE — 99284 EMERGENCY DEPT VISIT MOD MDM: CPT

## 2021-03-05 PROCEDURE — 80053 COMPREHEN METABOLIC PANEL: CPT

## 2021-03-05 PROCEDURE — 87581 M.PNEUMON DNA AMP PROBE: CPT

## 2021-03-05 PROCEDURE — 87798 DETECT AGENT NOS DNA AMP: CPT

## 2021-03-05 PROCEDURE — 73590 X-RAY EXAM OF LOWER LEG: CPT

## 2021-03-05 PROCEDURE — 36415 COLL VENOUS BLD VENIPUNCTURE: CPT

## 2021-03-05 PROCEDURE — 87633 RESP VIRUS 12-25 TARGETS: CPT

## 2021-03-05 PROCEDURE — 80202 ASSAY OF VANCOMYCIN: CPT

## 2021-03-05 PROCEDURE — 87040 BLOOD CULTURE FOR BACTERIA: CPT

## 2021-03-05 PROCEDURE — 83880 ASSAY OF NATRIURETIC PEPTIDE: CPT

## 2021-03-06 VITALS
TEMPERATURE: 98.24 F | DIASTOLIC BLOOD PRESSURE: 81 MMHG | OXYGEN SATURATION: 97 % | HEART RATE: 100 BPM | SYSTOLIC BLOOD PRESSURE: 149 MMHG | RESPIRATION RATE: 17 BRPM

## 2021-03-06 VITALS
DIASTOLIC BLOOD PRESSURE: 88 MMHG | SYSTOLIC BLOOD PRESSURE: 136 MMHG | TEMPERATURE: 98 F | OXYGEN SATURATION: 94 % | HEART RATE: 87 BPM | RESPIRATION RATE: 16 BRPM

## 2021-03-06 VITALS
RESPIRATION RATE: 18 BRPM | DIASTOLIC BLOOD PRESSURE: 84 MMHG | SYSTOLIC BLOOD PRESSURE: 152 MMHG | HEART RATE: 99 BPM | OXYGEN SATURATION: 98 % | TEMPERATURE: 98.4 F

## 2021-03-06 VITALS
DIASTOLIC BLOOD PRESSURE: 60 MMHG | SYSTOLIC BLOOD PRESSURE: 174 MMHG | RESPIRATION RATE: 19 BRPM | TEMPERATURE: 98.8 F | HEART RATE: 101 BPM | OXYGEN SATURATION: 100 %

## 2021-03-06 VITALS — HEART RATE: 99 BPM | OXYGEN SATURATION: 98 %

## 2021-03-06 VITALS
OXYGEN SATURATION: 99 % | SYSTOLIC BLOOD PRESSURE: 174 MMHG | RESPIRATION RATE: 18 BRPM | DIASTOLIC BLOOD PRESSURE: 100 MMHG | HEART RATE: 103 BPM | TEMPERATURE: 98.42 F

## 2021-03-06 VITALS — SYSTOLIC BLOOD PRESSURE: 143 MMHG | DIASTOLIC BLOOD PRESSURE: 77 MMHG

## 2021-03-06 LAB
ALBUMIN LEVEL: 2.7 G/DL (ref 3.5–5)
ALBUMIN/GLOB SERPL: 0.8 {RATIO} (ref 1.1–1.8)
ALP ISO SERPL-ACNC: 192 U/L (ref 38–126)
ALT SERPLBLD-CCNC: 53 U/L (ref 12–78)
ANION GAP SERPL CALC-SCNC: 3.9 MEQ/L (ref 5–15)
AST SERPL QL: 83 U/L (ref 17–59)
BILIRUBIN,TOTAL: 1.5 MG/DL (ref 0.2–1.3)
BUN SERPL-MCNC: 7 MG/DL (ref 9–20)
CALCIUM SPEC-MCNC: 8.1 MG/DL (ref 8.4–10.2)
CHLORIDE SPEC-SCNC: 108 MMOL/L (ref 98–107)
CO2 SERPL-SCNC: 28 MMOL/L (ref 22–30)
CREAT BLD-SCNC: 0.6 MG/DL (ref 0.66–1.25)
CREATININE CLEARANCE ESTIMATED: 264 ML/MIN (ref 50–200)
ESTIMATED GLOMERULAR FILT RATE: 144 ML/MIN (ref 60–?)
GFR (AFRICAN AMERICAN): 175 ML/MIN (ref 60–?)
GLOBULIN SER CALC-MCNC: 3.6 G/DL (ref 1.3–3.2)
GLUCOSE: 110 MG/DL (ref 74–100)
HCT VFR BLD CALC: 34.9 % (ref 42–52)
HGB BLD-MCNC: 11.4 G/DL (ref 14.1–18)
MCHC RBC-ENTMCNC: 32.8 G/DL (ref 31.8–35.4)
MCV RBC: 96.2 FL (ref 80–94)
MEAN CORPUSCULAR HEMOGLOBIN: 31.6 PG (ref 27–31.2)
PLATELET # BLD: 71 K/MM3 (ref 142–424)
POTASSIUM: 3.9 MMOL/L (ref 3.5–5.1)
PROT SERPL-MCNC: 6.3 G/DL (ref 6.3–8.2)
RBC # BLD AUTO: 3.62 M/MM3 (ref 4.6–6.2)
SODIUM SPEC-SCNC: 136 MMOL/L (ref 136–145)
WBC # BLD AUTO: 2.7 K/MM3 (ref 4.8–10.8)

## 2021-03-07 VITALS
TEMPERATURE: 99.2 F | RESPIRATION RATE: 18 BRPM | HEART RATE: 105 BPM | OXYGEN SATURATION: 99 % | DIASTOLIC BLOOD PRESSURE: 84 MMHG | SYSTOLIC BLOOD PRESSURE: 156 MMHG

## 2021-03-07 VITALS
RESPIRATION RATE: 16 BRPM | TEMPERATURE: 98.9 F | DIASTOLIC BLOOD PRESSURE: 86 MMHG | SYSTOLIC BLOOD PRESSURE: 150 MMHG | HEART RATE: 90 BPM | OXYGEN SATURATION: 99 %

## 2021-03-07 VITALS
DIASTOLIC BLOOD PRESSURE: 78 MMHG | SYSTOLIC BLOOD PRESSURE: 138 MMHG | RESPIRATION RATE: 18 BRPM | HEART RATE: 80 BPM | OXYGEN SATURATION: 98 % | TEMPERATURE: 98.6 F

## 2021-03-07 VITALS
DIASTOLIC BLOOD PRESSURE: 85 MMHG | TEMPERATURE: 98.06 F | RESPIRATION RATE: 16 BRPM | OXYGEN SATURATION: 99 % | SYSTOLIC BLOOD PRESSURE: 163 MMHG | HEART RATE: 76 BPM

## 2021-03-07 LAB — VANCOMYCIN TROUGH SERPL-MCNC: 16.1 UG/ML (ref 5–10)

## 2021-03-08 VITALS
OXYGEN SATURATION: 98 % | SYSTOLIC BLOOD PRESSURE: 137 MMHG | DIASTOLIC BLOOD PRESSURE: 81 MMHG | RESPIRATION RATE: 16 BRPM | HEART RATE: 74 BPM | TEMPERATURE: 98.3 F

## 2021-03-08 VITALS — OXYGEN SATURATION: 98 % | HEART RATE: 65 BPM

## 2021-03-08 VITALS
DIASTOLIC BLOOD PRESSURE: 62 MMHG | RESPIRATION RATE: 18 BRPM | SYSTOLIC BLOOD PRESSURE: 148 MMHG | HEART RATE: 72 BPM | OXYGEN SATURATION: 97 % | TEMPERATURE: 98.42 F

## 2021-03-08 VITALS
RESPIRATION RATE: 17 BRPM | OXYGEN SATURATION: 97 % | SYSTOLIC BLOOD PRESSURE: 143 MMHG | DIASTOLIC BLOOD PRESSURE: 77 MMHG | HEART RATE: 72 BPM | TEMPERATURE: 98.06 F

## 2021-03-08 VITALS — OXYGEN SATURATION: 97 % | HEART RATE: 81 BPM

## 2021-03-08 VITALS
HEART RATE: 76 BPM | SYSTOLIC BLOOD PRESSURE: 151 MMHG | TEMPERATURE: 98.6 F | DIASTOLIC BLOOD PRESSURE: 76 MMHG | OXYGEN SATURATION: 96 % | RESPIRATION RATE: 16 BRPM

## 2021-03-08 LAB
HCT VFR BLD CALC: 36 % (ref 42–52)
HGB BLD-MCNC: 11.8 G/DL (ref 14.1–18)
MCHC RBC-ENTMCNC: 32.7 G/DL (ref 31.8–35.4)
MCV RBC: 96.6 FL (ref 80–94)
MEAN CORPUSCULAR HEMOGLOBIN: 31.6 PG (ref 27–31.2)
PLATELET # BLD: 69 K/MM3 (ref 142–424)
RBC # BLD AUTO: 3.72 M/MM3 (ref 4.6–6.2)
WBC # BLD AUTO: 3.5 K/MM3 (ref 4.8–10.8)

## 2021-03-09 VITALS
HEART RATE: 71 BPM | SYSTOLIC BLOOD PRESSURE: 156 MMHG | OXYGEN SATURATION: 97 % | DIASTOLIC BLOOD PRESSURE: 99 MMHG | TEMPERATURE: 97.8 F | RESPIRATION RATE: 17 BRPM

## 2021-03-09 VITALS
OXYGEN SATURATION: 99 % | DIASTOLIC BLOOD PRESSURE: 84 MMHG | SYSTOLIC BLOOD PRESSURE: 147 MMHG | HEART RATE: 79 BPM | RESPIRATION RATE: 18 BRPM | TEMPERATURE: 97.88 F

## 2021-03-09 VITALS — OXYGEN SATURATION: 97 %

## 2021-03-09 LAB
ANION GAP SERPL CALC-SCNC: 5.1 MEQ/L (ref 5–15)
BUN SERPL-MCNC: 10 MG/DL (ref 9–20)
CALCIUM SPEC-MCNC: 8.1 MG/DL (ref 8.4–10.2)
CHLORIDE SPEC-SCNC: 110 MMOL/L (ref 98–107)
CO2 SERPL-SCNC: 28 MMOL/L (ref 22–30)
CREAT BLD-SCNC: 0.7 MG/DL (ref 0.66–1.25)
CREATININE CLEARANCE ESTIMATED: 126 ML/MIN (ref 50–200)
CRP SERPL HS-MCNC: 3.2 MG/L (ref 0–4)
ESTIMATED GLOMERULAR FILT RATE: 121 ML/MIN (ref 60–?)
GFR (AFRICAN AMERICAN): 146 ML/MIN (ref 60–?)
GLUCOSE: 178 MG/DL (ref 74–100)
HCT VFR BLD CALC: 34 % (ref 42–52)
HGB BLD-MCNC: 11 G/DL (ref 14.1–18)
MCHC RBC-ENTMCNC: 32.5 G/DL (ref 31.8–35.4)
MCV RBC: 96.7 FL (ref 80–94)
MEAN CORPUSCULAR HEMOGLOBIN: 31.4 PG (ref 27–31.2)
PLATELET # BLD: 59 K/MM3 (ref 142–424)
POTASSIUM: 4.1 MMOL/L (ref 3.5–5.1)
RBC # BLD AUTO: 3.52 M/MM3 (ref 4.6–6.2)
SODIUM SPEC-SCNC: 139 MMOL/L (ref 136–145)
VANCOMYCIN TROUGH SERPL-MCNC: 18.6 UG/ML (ref 5–10)
WBC # BLD AUTO: 2.6 K/MM3 (ref 4.8–10.8)

## 2021-05-18 ENCOUNTER — HOSPITAL ENCOUNTER (OUTPATIENT)
Dept: HOSPITAL 22 - ER | Age: 46
Setting detail: OBSERVATION
LOS: 1 days | Discharge: LEFT BEFORE DISCHARGE | End: 2021-05-19
Payer: MEDICARE

## 2021-05-18 VITALS — BODY MASS INDEX: 40.9 KG/M2 | BODY MASS INDEX: 39.5 KG/M2 | BODY MASS INDEX: 41.4 KG/M2 | BODY MASS INDEX: 41.3 KG/M2

## 2021-05-18 VITALS
SYSTOLIC BLOOD PRESSURE: 142 MMHG | RESPIRATION RATE: 20 BRPM | HEART RATE: 83 BPM | OXYGEN SATURATION: 100 % | DIASTOLIC BLOOD PRESSURE: 82 MMHG | TEMPERATURE: 98.9 F

## 2021-05-18 VITALS
HEART RATE: 85 BPM | SYSTOLIC BLOOD PRESSURE: 143 MMHG | OXYGEN SATURATION: 98 % | DIASTOLIC BLOOD PRESSURE: 72 MMHG | RESPIRATION RATE: 18 BRPM

## 2021-05-18 VITALS — HEART RATE: 83 BPM | DIASTOLIC BLOOD PRESSURE: 61 MMHG | OXYGEN SATURATION: 97 % | SYSTOLIC BLOOD PRESSURE: 132 MMHG

## 2021-05-18 VITALS — OXYGEN SATURATION: 100 % | HEART RATE: 82 BPM | DIASTOLIC BLOOD PRESSURE: 68 MMHG | SYSTOLIC BLOOD PRESSURE: 134 MMHG

## 2021-05-18 VITALS — HEART RATE: 76 BPM | DIASTOLIC BLOOD PRESSURE: 73 MMHG | SYSTOLIC BLOOD PRESSURE: 156 MMHG | OXYGEN SATURATION: 99 %

## 2021-05-18 VITALS — SYSTOLIC BLOOD PRESSURE: 132 MMHG | HEART RATE: 77 BPM | OXYGEN SATURATION: 97 % | DIASTOLIC BLOOD PRESSURE: 51 MMHG

## 2021-05-18 VITALS
OXYGEN SATURATION: 100 % | RESPIRATION RATE: 14 BRPM | HEART RATE: 86 BPM | DIASTOLIC BLOOD PRESSURE: 73 MMHG | SYSTOLIC BLOOD PRESSURE: 151 MMHG | TEMPERATURE: 98.4 F

## 2021-05-18 VITALS
RESPIRATION RATE: 16 BRPM | DIASTOLIC BLOOD PRESSURE: 61 MMHG | OXYGEN SATURATION: 97 % | SYSTOLIC BLOOD PRESSURE: 132 MMHG | HEART RATE: 83 BPM | TEMPERATURE: 98.4 F

## 2021-05-18 VITALS
TEMPERATURE: 100.22 F | HEART RATE: 103 BPM | SYSTOLIC BLOOD PRESSURE: 128 MMHG | RESPIRATION RATE: 16 BRPM | DIASTOLIC BLOOD PRESSURE: 77 MMHG | OXYGEN SATURATION: 98 %

## 2021-05-18 VITALS
RESPIRATION RATE: 18 BRPM | OXYGEN SATURATION: 100 % | HEART RATE: 83 BPM | SYSTOLIC BLOOD PRESSURE: 131 MMHG | DIASTOLIC BLOOD PRESSURE: 68 MMHG

## 2021-05-18 VITALS
OXYGEN SATURATION: 98 % | TEMPERATURE: 98.2 F | RESPIRATION RATE: 16 BRPM | SYSTOLIC BLOOD PRESSURE: 138 MMHG | HEART RATE: 94 BPM | DIASTOLIC BLOOD PRESSURE: 78 MMHG

## 2021-05-18 VITALS
HEART RATE: 92 BPM | RESPIRATION RATE: 18 BRPM | OXYGEN SATURATION: 99 % | DIASTOLIC BLOOD PRESSURE: 72 MMHG | SYSTOLIC BLOOD PRESSURE: 143 MMHG

## 2021-05-18 VITALS — DIASTOLIC BLOOD PRESSURE: 73 MMHG | SYSTOLIC BLOOD PRESSURE: 151 MMHG

## 2021-05-18 VITALS — HEART RATE: 82 BPM | OXYGEN SATURATION: 99 % | DIASTOLIC BLOOD PRESSURE: 62 MMHG | SYSTOLIC BLOOD PRESSURE: 139 MMHG

## 2021-05-18 VITALS — SYSTOLIC BLOOD PRESSURE: 136 MMHG | DIASTOLIC BLOOD PRESSURE: 74 MMHG | OXYGEN SATURATION: 98 % | HEART RATE: 91 BPM

## 2021-05-18 VITALS — HEART RATE: 80 BPM | OXYGEN SATURATION: 100 %

## 2021-05-18 VITALS — DIASTOLIC BLOOD PRESSURE: 69 MMHG | SYSTOLIC BLOOD PRESSURE: 131 MMHG

## 2021-05-18 DIAGNOSIS — J44.9: ICD-10-CM

## 2021-05-18 DIAGNOSIS — J45.909: ICD-10-CM

## 2021-05-18 DIAGNOSIS — F17.200: ICD-10-CM

## 2021-05-18 DIAGNOSIS — I86.8: ICD-10-CM

## 2021-05-18 DIAGNOSIS — L97.519: ICD-10-CM

## 2021-05-18 DIAGNOSIS — L97.513: ICD-10-CM

## 2021-05-18 DIAGNOSIS — A41.9: Primary | ICD-10-CM

## 2021-05-18 DIAGNOSIS — G89.29: ICD-10-CM

## 2021-05-18 DIAGNOSIS — L97.922: ICD-10-CM

## 2021-05-18 DIAGNOSIS — I10: ICD-10-CM

## 2021-05-18 DIAGNOSIS — L03.116: ICD-10-CM

## 2021-05-18 DIAGNOSIS — M19.90: ICD-10-CM

## 2021-05-18 DIAGNOSIS — L02.611: ICD-10-CM

## 2021-05-18 DIAGNOSIS — I25.2: ICD-10-CM

## 2021-05-18 DIAGNOSIS — Z88.5: ICD-10-CM

## 2021-05-18 DIAGNOSIS — E66.01: ICD-10-CM

## 2021-05-18 DIAGNOSIS — I87.8: ICD-10-CM

## 2021-05-18 DIAGNOSIS — L03.115: ICD-10-CM

## 2021-05-18 DIAGNOSIS — Z91.19: ICD-10-CM

## 2021-05-18 LAB
ALBUMIN LEVEL: 2.8 G/DL (ref 3.5–5)
ALBUMIN/GLOB SERPL: 0.8 {RATIO} (ref 1.1–1.8)
ALP ISO SERPL-ACNC: 158 U/L (ref 38–126)
ALT SERPLBLD-CCNC: 38 U/L (ref 12–78)
ANION GAP SERPL CALC-SCNC: 9 MEQ/L (ref 5–15)
AST SERPL QL: 48 U/L (ref 17–59)
BILIRUBIN,TOTAL: 2.5 MG/DL (ref 0.2–1.3)
BUN SERPL-MCNC: 9 MG/DL (ref 9–20)
CALCIUM SPEC-MCNC: 8.1 MG/DL (ref 8.4–10.2)
CELLS COUNTED: 100
CHLORIDE SPEC-SCNC: 103 MMOL/L (ref 98–107)
CO2 SERPL-SCNC: 24 MMOL/L (ref 22–30)
COLOR UR: YELLOW
CREAT BLD-SCNC: 0.7 MG/DL (ref 0.66–1.25)
CREATININE CLEARANCE ESTIMATED: 218 ML/MIN (ref 50–200)
CRP SERPL HS-MCNC: 41.5 MG/L (ref 0–4)
ESTIMATED GLOMERULAR FILT RATE: 121 ML/MIN (ref 60–?)
GFR (AFRICAN AMERICAN): 146 ML/MIN (ref 60–?)
GLOBULIN SER CALC-MCNC: 3.3 G/DL (ref 1.3–3.2)
GLUCOSE: 146 MG/DL (ref 74–100)
HCT VFR BLD CALC: 36.8 % (ref 42–52)
HGB BLD-MCNC: 12.4 G/DL (ref 14.1–18)
MACROCYTES BLD QL: (no result)
MANUAL DIFFERENTIAL: (no result)
MCHC RBC-ENTMCNC: 33.7 G/DL (ref 31.8–35.4)
MCV RBC: 92.4 FL (ref 80–94)
MEAN CORPUSCULAR HEMOGLOBIN: 31.1 PG (ref 27–31.2)
MICRO URNS: (no result)
NT PRO BRAIN NATRIURETIC PEP.: 203 PG/ML (ref 0–125)
PH UR: 6.5 [PH] (ref 5–8.5)
PLATELET # BLD: 64 K/MM3 (ref 142–424)
POTASSIUM: 3 MMOL/L (ref 3.5–5.1)
PROT SERPL-MCNC: 6.1 G/DL (ref 6.3–8.2)
RBC # BLD AUTO: 3.99 M/MM3 (ref 4.6–6.2)
SODIUM SPEC-SCNC: 133 MMOL/L (ref 136–145)
SP GR UR: 1.01 (ref 1–1.03)
TROPONIN I: 0.03 NG/ML (ref 0–0.03)
TROPONIN I: 0.04 NG/ML (ref 0–0.03)
UROBILINOGEN UR QL: 1 EU/DL
WBC # BLD AUTO: 12.2 K/MM3 (ref 4.8–10.8)

## 2021-05-18 PROCEDURE — 80202 ASSAY OF VANCOMYCIN: CPT

## 2021-05-18 PROCEDURE — 85007 BL SMEAR W/DIFF WBC COUNT: CPT

## 2021-05-18 PROCEDURE — U0003 INFECTIOUS AGENT DETECTION BY NUCLEIC ACID (DNA OR RNA); SEVERE ACUTE RESPIRATORY SYNDROME CORONAVIRUS 2 (SARS-COV-2) (CORONAVIRUS DISEASE [COVID-19]), AMPLIFIED PROBE TECHNIQUE, MAKING USE OF HIGH THROUGHPUT TECHNOLOGIES AS DESCRIBED BY CMS-2020-01-R: HCPCS

## 2021-05-18 PROCEDURE — 80048 BASIC METABOLIC PNL TOTAL CA: CPT

## 2021-05-18 PROCEDURE — 83605 ASSAY OF LACTIC ACID: CPT

## 2021-05-18 PROCEDURE — G0378 HOSPITAL OBSERVATION PER HR: HCPCS

## 2021-05-18 PROCEDURE — 87040 BLOOD CULTURE FOR BACTERIA: CPT

## 2021-05-18 PROCEDURE — 36415 COLL VENOUS BLD VENIPUNCTURE: CPT

## 2021-05-18 PROCEDURE — 85651 RBC SED RATE NONAUTOMATED: CPT

## 2021-05-18 PROCEDURE — 83036 HEMOGLOBIN GLYCOSYLATED A1C: CPT

## 2021-05-18 PROCEDURE — 71045 X-RAY EXAM CHEST 1 VIEW: CPT

## 2021-05-18 PROCEDURE — 73700 CT LOWER EXTREMITY W/O DYE: CPT

## 2021-05-18 PROCEDURE — 85025 COMPLETE CBC W/AUTO DIFF WBC: CPT

## 2021-05-18 PROCEDURE — 87081 CULTURE SCREEN ONLY: CPT

## 2021-05-18 PROCEDURE — 96375 TX/PRO/DX INJ NEW DRUG ADDON: CPT

## 2021-05-18 PROCEDURE — 96366 THER/PROPH/DIAG IV INF ADDON: CPT

## 2021-05-18 PROCEDURE — 84484 ASSAY OF TROPONIN QUANT: CPT

## 2021-05-18 PROCEDURE — 86140 C-REACTIVE PROTEIN: CPT

## 2021-05-18 PROCEDURE — 83880 ASSAY OF NATRIURETIC PEPTIDE: CPT

## 2021-05-18 PROCEDURE — 81001 URINALYSIS AUTO W/SCOPE: CPT

## 2021-05-18 PROCEDURE — G0463 HOSPITAL OUTPT CLINIC VISIT: HCPCS

## 2021-05-18 PROCEDURE — 96365 THER/PROPH/DIAG IV INF INIT: CPT

## 2021-05-18 PROCEDURE — 99203 OFFICE O/P NEW LOW 30 MIN: CPT

## 2021-05-18 PROCEDURE — 80053 COMPREHEN METABOLIC PANEL: CPT

## 2021-05-19 VITALS
SYSTOLIC BLOOD PRESSURE: 171 MMHG | HEART RATE: 108 BPM | TEMPERATURE: 98.6 F | RESPIRATION RATE: 18 BRPM | OXYGEN SATURATION: 95 % | DIASTOLIC BLOOD PRESSURE: 85 MMHG

## 2021-05-19 VITALS
SYSTOLIC BLOOD PRESSURE: 152 MMHG | RESPIRATION RATE: 16 BRPM | HEART RATE: 81 BPM | OXYGEN SATURATION: 98 % | DIASTOLIC BLOOD PRESSURE: 76 MMHG | TEMPERATURE: 98.24 F

## 2021-05-19 VITALS
SYSTOLIC BLOOD PRESSURE: 147 MMHG | HEART RATE: 88 BPM | TEMPERATURE: 97.88 F | DIASTOLIC BLOOD PRESSURE: 102 MMHG | RESPIRATION RATE: 20 BRPM | OXYGEN SATURATION: 98 %

## 2021-05-19 VITALS
RESPIRATION RATE: 20 BRPM | SYSTOLIC BLOOD PRESSURE: 129 MMHG | HEART RATE: 93 BPM | OXYGEN SATURATION: 95 % | TEMPERATURE: 97.88 F | DIASTOLIC BLOOD PRESSURE: 72 MMHG

## 2021-05-19 LAB
ANION GAP SERPL CALC-SCNC: 3.6 MEQ/L (ref 5–15)
BUN SERPL-MCNC: 14 MG/DL (ref 9–20)
CALCIUM SPEC-MCNC: 7.7 MG/DL (ref 8.4–10.2)
CHLORIDE SPEC-SCNC: 110 MMOL/L (ref 98–107)
CO2 SERPL-SCNC: 26 MMOL/L (ref 22–30)
CREAT BLD-SCNC: 0.7 MG/DL (ref 0.66–1.25)
CREATININE CLEARANCE ESTIMATED: 126 ML/MIN (ref 50–200)
ESTIMATED GLOMERULAR FILT RATE: 121 ML/MIN (ref 60–?)
GFR (AFRICAN AMERICAN): 146 ML/MIN (ref 60–?)
GLUCOSE: 95 MG/DL (ref 74–100)
HBA1C MFR BLD: 4.5 % (ref 4–6)
HCT VFR BLD CALC: 36.8 % (ref 42–52)
HGB BLD-MCNC: 12 G/DL (ref 14.1–18)
MCHC RBC-ENTMCNC: 32.7 G/DL (ref 31.8–35.4)
MCV RBC: 95.5 FL (ref 80–94)
MEAN CORPUSCULAR HEMOGLOBIN: 31.2 PG (ref 27–31.2)
PLATELET # BLD: 53 K/MM3 (ref 142–424)
POTASSIUM: 3.6 MMOL/L (ref 3.5–5.1)
RBC # BLD AUTO: 3.85 M/MM3 (ref 4.6–6.2)
SODIUM SPEC-SCNC: 136 MMOL/L (ref 136–145)
VANCOMYCIN TROUGH SERPL-MCNC: 18.5 UG/ML (ref 5–10)
WBC # BLD AUTO: 5.6 K/MM3 (ref 4.8–10.8)

## 2021-05-27 ENCOUNTER — HOSPITAL ENCOUNTER (OUTPATIENT)
Dept: HOSPITAL 22 - ER | Age: 46
Setting detail: OBSERVATION
LOS: 4 days | Discharge: HOME | End: 2021-05-31
Payer: MEDICARE

## 2021-05-27 VITALS
SYSTOLIC BLOOD PRESSURE: 139 MMHG | HEART RATE: 89 BPM | DIASTOLIC BLOOD PRESSURE: 71 MMHG | RESPIRATION RATE: 16 BRPM | OXYGEN SATURATION: 98 % | TEMPERATURE: 97.88 F

## 2021-05-27 VITALS
SYSTOLIC BLOOD PRESSURE: 153 MMHG | RESPIRATION RATE: 17 BRPM | DIASTOLIC BLOOD PRESSURE: 73 MMHG | TEMPERATURE: 98.4 F | OXYGEN SATURATION: 96 %

## 2021-05-27 VITALS — HEART RATE: 86 BPM | DIASTOLIC BLOOD PRESSURE: 71 MMHG | OXYGEN SATURATION: 100 % | SYSTOLIC BLOOD PRESSURE: 139 MMHG

## 2021-05-27 VITALS
RESPIRATION RATE: 16 BRPM | OXYGEN SATURATION: 97 % | HEART RATE: 87 BPM | DIASTOLIC BLOOD PRESSURE: 89 MMHG | SYSTOLIC BLOOD PRESSURE: 155 MMHG

## 2021-05-27 VITALS
BODY MASS INDEX: 39.9 KG/M2 | BODY MASS INDEX: 42 KG/M2 | BODY MASS INDEX: 43 KG/M2 | BODY MASS INDEX: 42.2 KG/M2 | BODY MASS INDEX: 41.3 KG/M2 | BODY MASS INDEX: 42.7 KG/M2

## 2021-05-27 VITALS — HEART RATE: 88 BPM | OXYGEN SATURATION: 100 % | DIASTOLIC BLOOD PRESSURE: 68 MMHG | SYSTOLIC BLOOD PRESSURE: 137 MMHG

## 2021-05-27 VITALS
TEMPERATURE: 98.96 F | HEART RATE: 92 BPM | DIASTOLIC BLOOD PRESSURE: 75 MMHG | OXYGEN SATURATION: 94 % | RESPIRATION RATE: 18 BRPM | SYSTOLIC BLOOD PRESSURE: 141 MMHG

## 2021-05-27 VITALS
DIASTOLIC BLOOD PRESSURE: 89 MMHG | HEART RATE: 85 BPM | SYSTOLIC BLOOD PRESSURE: 156 MMHG | OXYGEN SATURATION: 99 % | RESPIRATION RATE: 20 BRPM | TEMPERATURE: 98.42 F

## 2021-05-27 VITALS
DIASTOLIC BLOOD PRESSURE: 85 MMHG | TEMPERATURE: 98.1 F | SYSTOLIC BLOOD PRESSURE: 170 MMHG | OXYGEN SATURATION: 96 % | HEART RATE: 74 BPM | RESPIRATION RATE: 20 BRPM

## 2021-05-27 VITALS — OXYGEN SATURATION: 100 % | HEART RATE: 74 BPM

## 2021-05-27 VITALS — OXYGEN SATURATION: 94 %

## 2021-05-27 DIAGNOSIS — Z22.322: ICD-10-CM

## 2021-05-27 DIAGNOSIS — Z20.822: ICD-10-CM

## 2021-05-27 DIAGNOSIS — M19.90: ICD-10-CM

## 2021-05-27 DIAGNOSIS — I83.029: ICD-10-CM

## 2021-05-27 DIAGNOSIS — Z16.39: ICD-10-CM

## 2021-05-27 DIAGNOSIS — I10: ICD-10-CM

## 2021-05-27 DIAGNOSIS — E66.9: ICD-10-CM

## 2021-05-27 DIAGNOSIS — E11.621: ICD-10-CM

## 2021-05-27 DIAGNOSIS — L97.921: ICD-10-CM

## 2021-05-27 DIAGNOSIS — L03.115: ICD-10-CM

## 2021-05-27 DIAGNOSIS — L03.116: Primary | ICD-10-CM

## 2021-05-27 DIAGNOSIS — B96.5: ICD-10-CM

## 2021-05-27 DIAGNOSIS — E03.9: ICD-10-CM

## 2021-05-27 DIAGNOSIS — I87.2: ICD-10-CM

## 2021-05-27 DIAGNOSIS — D70.9: ICD-10-CM

## 2021-05-27 DIAGNOSIS — J44.9: ICD-10-CM

## 2021-05-27 LAB
ALBUMIN LEVEL: 2.6 G/DL (ref 3.5–5)
ALBUMIN/GLOB SERPL: 0.8 {RATIO} (ref 1.1–1.8)
ALP ISO SERPL-ACNC: 114 U/L (ref 38–126)
ALT SERPLBLD-CCNC: 26 U/L (ref 12–78)
ANION GAP SERPL CALC-SCNC: 5.2 MEQ/L (ref 5–15)
AST SERPL QL: 43 U/L (ref 17–59)
BILIRUBIN,TOTAL: 2.7 MG/DL (ref 0.2–1.3)
BUN SERPL-MCNC: 9 MG/DL (ref 9–20)
CALCIUM SPEC-MCNC: 7.6 MG/DL (ref 8.4–10.2)
CHLORIDE SPEC-SCNC: 106 MMOL/L (ref 98–107)
CO2 SERPL-SCNC: 27 MMOL/L (ref 22–30)
CREAT BLD-SCNC: 0.7 MG/DL (ref 0.66–1.25)
CREATININE CLEARANCE ESTIMATED: 226 ML/MIN (ref 50–200)
CRP SERPL HS-MCNC: 58.6 MG/L (ref 0–4)
ESTIMATED GLOMERULAR FILT RATE: 121 ML/MIN (ref 60–?)
GFR (AFRICAN AMERICAN): 146 ML/MIN (ref 60–?)
GLOBULIN SER CALC-MCNC: 3.4 G/DL (ref 1.3–3.2)
GLUCOSE: 197 MG/DL (ref 74–100)
HCT VFR BLD CALC: 33.3 % (ref 42–52)
HGB BLD-MCNC: 11.3 G/DL (ref 14.1–18)
MCHC RBC-ENTMCNC: 34 G/DL (ref 31.8–35.4)
MCV RBC: 93.9 FL (ref 80–94)
MEAN CORPUSCULAR HEMOGLOBIN: 31.9 PG (ref 27–31.2)
PLATELET # BLD: 87 K/MM3 (ref 142–424)
POTASSIUM: 3.2 MMOL/L (ref 3.5–5.1)
PROCALCITONIN: 0.39 NG/ML (ref 0–2)
PROT SERPL-MCNC: 6 G/DL (ref 6.3–8.2)
RBC # BLD AUTO: 3.55 M/MM3 (ref 4.6–6.2)
SODIUM SPEC-SCNC: 135 MMOL/L (ref 136–145)
WBC # BLD AUTO: 4.9 K/MM3 (ref 4.8–10.8)

## 2021-05-27 PROCEDURE — 87040 BLOOD CULTURE FOR BACTERIA: CPT

## 2021-05-27 PROCEDURE — 73590 X-RAY EXAM OF LOWER LEG: CPT

## 2021-05-27 PROCEDURE — 80048 BASIC METABOLIC PNL TOTAL CA: CPT

## 2021-05-27 PROCEDURE — 87081 CULTURE SCREEN ONLY: CPT

## 2021-05-27 PROCEDURE — 96367 TX/PROPH/DG ADDL SEQ IV INF: CPT

## 2021-05-27 PROCEDURE — 87798 DETECT AGENT NOS DNA AMP: CPT

## 2021-05-27 PROCEDURE — 71045 X-RAY EXAM CHEST 1 VIEW: CPT

## 2021-05-27 PROCEDURE — 96365 THER/PROPH/DIAG IV INF INIT: CPT

## 2021-05-27 PROCEDURE — 86140 C-REACTIVE PROTEIN: CPT

## 2021-05-27 PROCEDURE — 87205 SMEAR GRAM STAIN: CPT

## 2021-05-27 PROCEDURE — 80053 COMPREHEN METABOLIC PANEL: CPT

## 2021-05-27 PROCEDURE — 87581 M.PNEUMON DNA AMP PROBE: CPT

## 2021-05-27 PROCEDURE — 36415 COLL VENOUS BLD VENIPUNCTURE: CPT

## 2021-05-27 PROCEDURE — 87070 CULTURE OTHR SPECIMN AEROBIC: CPT

## 2021-05-27 PROCEDURE — 96376 TX/PRO/DX INJ SAME DRUG ADON: CPT

## 2021-05-27 PROCEDURE — 87633 RESP VIRUS 12-25 TARGETS: CPT

## 2021-05-27 PROCEDURE — 11042 DBRDMT SUBQ TIS 1ST 20SQCM/<: CPT

## 2021-05-27 PROCEDURE — 87186 SC STD MICRODIL/AGAR DIL: CPT

## 2021-05-27 PROCEDURE — G0378 HOSPITAL OBSERVATION PER HR: HCPCS

## 2021-05-27 PROCEDURE — 85025 COMPLETE CBC W/AUTO DIFF WBC: CPT

## 2021-05-27 PROCEDURE — 84145 PROCALCITONIN (PCT): CPT

## 2021-05-27 PROCEDURE — 80202 ASSAY OF VANCOMYCIN: CPT

## 2021-05-27 PROCEDURE — 96375 TX/PRO/DX INJ NEW DRUG ADDON: CPT

## 2021-05-27 PROCEDURE — 85651 RBC SED RATE NONAUTOMATED: CPT

## 2021-05-27 PROCEDURE — 87077 CULTURE AEROBIC IDENTIFY: CPT

## 2021-05-27 PROCEDURE — 83605 ASSAY OF LACTIC ACID: CPT

## 2021-05-27 PROCEDURE — 99284 EMERGENCY DEPT VISIT MOD MDM: CPT

## 2021-05-28 VITALS — SYSTOLIC BLOOD PRESSURE: 160 MMHG | DIASTOLIC BLOOD PRESSURE: 85 MMHG

## 2021-05-28 VITALS
HEART RATE: 85 BPM | OXYGEN SATURATION: 97 % | TEMPERATURE: 98.24 F | RESPIRATION RATE: 18 BRPM | SYSTOLIC BLOOD PRESSURE: 160 MMHG | DIASTOLIC BLOOD PRESSURE: 80 MMHG

## 2021-05-28 VITALS
SYSTOLIC BLOOD PRESSURE: 154 MMHG | HEART RATE: 89 BPM | TEMPERATURE: 98.06 F | DIASTOLIC BLOOD PRESSURE: 82 MMHG | RESPIRATION RATE: 17 BRPM | OXYGEN SATURATION: 97 %

## 2021-05-28 VITALS
TEMPERATURE: 97.88 F | HEART RATE: 81 BPM | SYSTOLIC BLOOD PRESSURE: 180 MMHG | OXYGEN SATURATION: 100 % | DIASTOLIC BLOOD PRESSURE: 100 MMHG | RESPIRATION RATE: 16 BRPM

## 2021-05-28 VITALS
SYSTOLIC BLOOD PRESSURE: 156 MMHG | OXYGEN SATURATION: 99 % | DIASTOLIC BLOOD PRESSURE: 87 MMHG | HEART RATE: 81 BPM | TEMPERATURE: 98.06 F | RESPIRATION RATE: 20 BRPM

## 2021-05-28 VITALS
RESPIRATION RATE: 18 BRPM | DIASTOLIC BLOOD PRESSURE: 83 MMHG | SYSTOLIC BLOOD PRESSURE: 145 MMHG | TEMPERATURE: 98.06 F | HEART RATE: 84 BPM | OXYGEN SATURATION: 99 %

## 2021-05-28 VITALS — RESPIRATION RATE: 16 BRPM

## 2021-05-28 LAB
ALBUMIN LEVEL: 2.7 G/DL (ref 3.5–5)
ALBUMIN/GLOB SERPL: 0.8 {RATIO} (ref 1.1–1.8)
ALP ISO SERPL-ACNC: 150 U/L (ref 38–126)
ALT SERPLBLD-CCNC: 25 U/L (ref 12–78)
ANION GAP SERPL CALC-SCNC: 5.5 MEQ/L (ref 5–15)
AST SERPL QL: 46 U/L (ref 17–59)
BILIRUBIN,TOTAL: 1.3 MG/DL (ref 0.2–1.3)
BUN SERPL-MCNC: 9 MG/DL (ref 9–20)
CALCIUM SPEC-MCNC: 7.5 MG/DL (ref 8.4–10.2)
CHLORIDE SPEC-SCNC: 107 MMOL/L (ref 98–107)
CO2 SERPL-SCNC: 27 MMOL/L (ref 22–30)
CREAT BLD-SCNC: 0.6 MG/DL (ref 0.66–1.25)
CREATININE CLEARANCE ESTIMATED: 147 ML/MIN (ref 50–200)
ESTIMATED GLOMERULAR FILT RATE: 144 ML/MIN (ref 60–?)
GFR (AFRICAN AMERICAN): 175 ML/MIN (ref 60–?)
GLOBULIN SER CALC-MCNC: 3.6 G/DL (ref 1.3–3.2)
GLUCOSE: 92 MG/DL (ref 74–100)
HCT VFR BLD CALC: 33.1 % (ref 42–52)
HGB BLD-MCNC: 10.9 G/DL (ref 14.1–18)
MCHC RBC-ENTMCNC: 32.9 G/DL (ref 31.8–35.4)
MCV RBC: 95.1 FL (ref 80–94)
MEAN CORPUSCULAR HEMOGLOBIN: 31.3 PG (ref 27–31.2)
PLATELET # BLD: 104 K/MM3 (ref 142–424)
POTASSIUM: 3.5 MMOL/L (ref 3.5–5.1)
PROT SERPL-MCNC: 6.3 G/DL (ref 6.3–8.2)
RBC # BLD AUTO: 3.48 M/MM3 (ref 4.6–6.2)
SODIUM SPEC-SCNC: 136 MMOL/L (ref 136–145)
VANCOMYCIN TROUGH SERPL-MCNC: 22.1 UG/ML (ref 5–10)
WBC # BLD AUTO: 3.6 K/MM3 (ref 4.8–10.8)

## 2021-05-29 VITALS
RESPIRATION RATE: 18 BRPM | HEART RATE: 86 BPM | TEMPERATURE: 98.42 F | OXYGEN SATURATION: 95 % | SYSTOLIC BLOOD PRESSURE: 163 MMHG | DIASTOLIC BLOOD PRESSURE: 80 MMHG

## 2021-05-29 VITALS
DIASTOLIC BLOOD PRESSURE: 68 MMHG | RESPIRATION RATE: 17 BRPM | SYSTOLIC BLOOD PRESSURE: 131 MMHG | OXYGEN SATURATION: 96 % | HEART RATE: 91 BPM | TEMPERATURE: 97.9 F

## 2021-05-29 VITALS
SYSTOLIC BLOOD PRESSURE: 173 MMHG | RESPIRATION RATE: 20 BRPM | HEART RATE: 83 BPM | TEMPERATURE: 98.5 F | OXYGEN SATURATION: 100 % | DIASTOLIC BLOOD PRESSURE: 86 MMHG

## 2021-05-29 VITALS
HEART RATE: 86 BPM | OXYGEN SATURATION: 92 % | TEMPERATURE: 98.06 F | RESPIRATION RATE: 17 BRPM | SYSTOLIC BLOOD PRESSURE: 143 MMHG | DIASTOLIC BLOOD PRESSURE: 79 MMHG

## 2021-05-29 VITALS
HEART RATE: 80 BPM | DIASTOLIC BLOOD PRESSURE: 72 MMHG | RESPIRATION RATE: 16 BRPM | SYSTOLIC BLOOD PRESSURE: 139 MMHG | OXYGEN SATURATION: 97 % | TEMPERATURE: 98.24 F

## 2021-05-30 VITALS
TEMPERATURE: 97.7 F | HEART RATE: 79 BPM | RESPIRATION RATE: 18 BRPM | DIASTOLIC BLOOD PRESSURE: 78 MMHG | OXYGEN SATURATION: 94 % | SYSTOLIC BLOOD PRESSURE: 138 MMHG

## 2021-05-30 VITALS
OXYGEN SATURATION: 93 % | DIASTOLIC BLOOD PRESSURE: 84 MMHG | HEART RATE: 84 BPM | RESPIRATION RATE: 16 BRPM | SYSTOLIC BLOOD PRESSURE: 142 MMHG | TEMPERATURE: 98.5 F

## 2021-05-30 VITALS
SYSTOLIC BLOOD PRESSURE: 163 MMHG | HEART RATE: 81 BPM | RESPIRATION RATE: 16 BRPM | TEMPERATURE: 98.1 F | DIASTOLIC BLOOD PRESSURE: 79 MMHG | OXYGEN SATURATION: 96 %

## 2021-05-30 VITALS
RESPIRATION RATE: 18 BRPM | TEMPERATURE: 98.4 F | SYSTOLIC BLOOD PRESSURE: 163 MMHG | OXYGEN SATURATION: 97 % | HEART RATE: 82 BPM | DIASTOLIC BLOOD PRESSURE: 75 MMHG

## 2021-05-30 LAB
ANION GAP SERPL CALC-SCNC: 3.7 MEQ/L (ref 5–15)
BUN SERPL-MCNC: 8 MG/DL (ref 9–20)
CALCIUM SPEC-MCNC: 7.6 MG/DL (ref 8.4–10.2)
CHLORIDE SPEC-SCNC: 108 MMOL/L (ref 98–107)
CO2 SERPL-SCNC: 29 MMOL/L (ref 22–30)
CREAT BLD-SCNC: 0.7 MG/DL (ref 0.66–1.25)
CREATININE CLEARANCE ESTIMATED: 126 ML/MIN (ref 50–200)
ESTIMATED GLOMERULAR FILT RATE: 121 ML/MIN (ref 60–?)
GFR (AFRICAN AMERICAN): 146 ML/MIN (ref 60–?)
GLUCOSE: 178 MG/DL (ref 74–100)
HCT VFR BLD CALC: 33.9 % (ref 42–52)
HGB BLD-MCNC: 11 G/DL (ref 14.1–18)
MCHC RBC-ENTMCNC: 32.3 G/DL (ref 31.8–35.4)
MCV RBC: 96.8 FL (ref 80–94)
MEAN CORPUSCULAR HEMOGLOBIN: 31.3 PG (ref 27–31.2)
PLATELET # BLD: 111 K/MM3 (ref 142–424)
POTASSIUM: 3.7 MMOL/L (ref 3.5–5.1)
RBC # BLD AUTO: 3.5 M/MM3 (ref 4.6–6.2)
SODIUM SPEC-SCNC: 137 MMOL/L (ref 136–145)
VANCOMYCIN TROUGH SERPL-MCNC: 16.5 UG/ML (ref 5–10)
WBC # BLD AUTO: 1.9 K/MM3 (ref 4.8–10.8)

## 2021-05-31 VITALS
SYSTOLIC BLOOD PRESSURE: 167 MMHG | HEART RATE: 89 BPM | OXYGEN SATURATION: 97 % | TEMPERATURE: 97.88 F | RESPIRATION RATE: 20 BRPM | DIASTOLIC BLOOD PRESSURE: 96 MMHG

## 2021-05-31 VITALS
SYSTOLIC BLOOD PRESSURE: 164 MMHG | TEMPERATURE: 98.6 F | RESPIRATION RATE: 17 BRPM | OXYGEN SATURATION: 92 % | DIASTOLIC BLOOD PRESSURE: 98 MMHG | HEART RATE: 83 BPM

## 2021-06-25 ENCOUNTER — HOSPITAL ENCOUNTER (OUTPATIENT)
Dept: HOSPITAL 22 - PT | Age: 46
Discharge: HOME | End: 2021-06-25
Payer: MEDICARE

## 2021-06-25 DIAGNOSIS — I83.019: Primary | ICD-10-CM

## 2021-06-25 DIAGNOSIS — L97.921: ICD-10-CM

## 2021-06-25 DIAGNOSIS — L97.911: ICD-10-CM

## 2021-06-25 DIAGNOSIS — I83.029: ICD-10-CM

## 2021-06-25 PROCEDURE — 97162 PT EVAL MOD COMPLEX 30 MIN: CPT

## 2021-09-17 ENCOUNTER — HOSPITAL ENCOUNTER (INPATIENT)
Dept: HOSPITAL 22 - ER | Age: 46
LOS: 3 days | Discharge: HOME | DRG: 603 | End: 2021-09-20
Payer: MEDICARE

## 2021-09-17 VITALS — OXYGEN SATURATION: 100 %

## 2021-09-17 VITALS — DIASTOLIC BLOOD PRESSURE: 73 MMHG | HEART RATE: 97 BPM | SYSTOLIC BLOOD PRESSURE: 123 MMHG | OXYGEN SATURATION: 95 %

## 2021-09-17 VITALS
SYSTOLIC BLOOD PRESSURE: 147 MMHG | TEMPERATURE: 98.4 F | DIASTOLIC BLOOD PRESSURE: 80 MMHG | RESPIRATION RATE: 18 BRPM | OXYGEN SATURATION: 99 % | HEART RATE: 106 BPM

## 2021-09-17 VITALS
SYSTOLIC BLOOD PRESSURE: 128 MMHG | HEART RATE: 81 BPM | DIASTOLIC BLOOD PRESSURE: 79 MMHG | TEMPERATURE: 98.42 F | RESPIRATION RATE: 20 BRPM | OXYGEN SATURATION: 98 %

## 2021-09-17 VITALS
RESPIRATION RATE: 24 BRPM | SYSTOLIC BLOOD PRESSURE: 160 MMHG | TEMPERATURE: 98.78 F | HEART RATE: 81 BPM | OXYGEN SATURATION: 100 % | DIASTOLIC BLOOD PRESSURE: 91 MMHG

## 2021-09-17 VITALS
TEMPERATURE: 98.24 F | DIASTOLIC BLOOD PRESSURE: 81 MMHG | HEART RATE: 89 BPM | RESPIRATION RATE: 15 BRPM | SYSTOLIC BLOOD PRESSURE: 149 MMHG | OXYGEN SATURATION: 99 %

## 2021-09-17 VITALS
RESPIRATION RATE: 17 BRPM | HEART RATE: 97 BPM | OXYGEN SATURATION: 95 % | SYSTOLIC BLOOD PRESSURE: 119 MMHG | DIASTOLIC BLOOD PRESSURE: 76 MMHG

## 2021-09-17 VITALS — BODY MASS INDEX: 39.9 KG/M2 | BODY MASS INDEX: 42.9 KG/M2 | BODY MASS INDEX: 43.1 KG/M2 | BODY MASS INDEX: 40.4 KG/M2

## 2021-09-17 VITALS
DIASTOLIC BLOOD PRESSURE: 63 MMHG | HEART RATE: 102 BPM | RESPIRATION RATE: 18 BRPM | OXYGEN SATURATION: 96 % | SYSTOLIC BLOOD PRESSURE: 80 MMHG

## 2021-09-17 VITALS
HEART RATE: 95 BPM | SYSTOLIC BLOOD PRESSURE: 134 MMHG | OXYGEN SATURATION: 95 % | RESPIRATION RATE: 20 BRPM | DIASTOLIC BLOOD PRESSURE: 79 MMHG

## 2021-09-17 VITALS — SYSTOLIC BLOOD PRESSURE: 125 MMHG | DIASTOLIC BLOOD PRESSURE: 76 MMHG | OXYGEN SATURATION: 96 % | HEART RATE: 98 BPM

## 2021-09-17 VITALS
OXYGEN SATURATION: 96 % | SYSTOLIC BLOOD PRESSURE: 125 MMHG | DIASTOLIC BLOOD PRESSURE: 54 MMHG | RESPIRATION RATE: 18 BRPM | HEART RATE: 91 BPM

## 2021-09-17 VITALS — OXYGEN SATURATION: 99 %

## 2021-09-17 DIAGNOSIS — I73.9: ICD-10-CM

## 2021-09-17 DIAGNOSIS — Z20.822: ICD-10-CM

## 2021-09-17 DIAGNOSIS — Z91.19: ICD-10-CM

## 2021-09-17 DIAGNOSIS — A49.02: ICD-10-CM

## 2021-09-17 DIAGNOSIS — K74.60: ICD-10-CM

## 2021-09-17 DIAGNOSIS — K72.90: ICD-10-CM

## 2021-09-17 DIAGNOSIS — F17.220: ICD-10-CM

## 2021-09-17 DIAGNOSIS — M19.90: ICD-10-CM

## 2021-09-17 DIAGNOSIS — L97.529: ICD-10-CM

## 2021-09-17 DIAGNOSIS — L03.116: Primary | ICD-10-CM

## 2021-09-17 DIAGNOSIS — G89.29: ICD-10-CM

## 2021-09-17 DIAGNOSIS — I83.029: ICD-10-CM

## 2021-09-17 DIAGNOSIS — L03.115: ICD-10-CM

## 2021-09-17 DIAGNOSIS — I89.0: ICD-10-CM

## 2021-09-17 DIAGNOSIS — J44.9: ICD-10-CM

## 2021-09-17 DIAGNOSIS — E87.6: ICD-10-CM

## 2021-09-17 DIAGNOSIS — L97.229: ICD-10-CM

## 2021-09-17 DIAGNOSIS — E66.01: ICD-10-CM

## 2021-09-17 DIAGNOSIS — E03.9: ICD-10-CM

## 2021-09-17 DIAGNOSIS — D70.9: ICD-10-CM

## 2021-09-17 DIAGNOSIS — I10: ICD-10-CM

## 2021-09-17 LAB
ALBUMIN LEVEL: 2.8 G/DL (ref 3.5–5)
ALBUMIN LEVEL: 2.9 G/DL (ref 3.5–5)
ALBUMIN/GLOB SERPL: 0.9 {RATIO} (ref 1.1–1.8)
ALBUMIN/GLOB SERPL: 1 {RATIO} (ref 1.1–1.8)
ALP ISO SERPL-ACNC: 145 U/L (ref 38–126)
ALP ISO SERPL-ACNC: 157 U/L (ref 38–126)
ALT SERPLBLD-CCNC: 34 U/L (ref 12–78)
ALT SERPLBLD-CCNC: 34 U/L (ref 12–78)
AMMONIA: 78 UMOL/L (ref 9–30)
ANION GAP SERPL CALC-SCNC: 8.7 MEQ/L (ref 5–15)
ANION GAP SERPL CALC-SCNC: 9.6 MEQ/L (ref 5–15)
APTT PPP: 22.9 SECONDS (ref 22.8–30.6)
AST SERPL QL: 56 U/L (ref 17–59)
AST SERPL QL: 57 U/L (ref 17–59)
BILIRUBIN,TOTAL: 3.7 MG/DL (ref 0.2–1.3)
BILIRUBIN,TOTAL: 4.4 MG/DL (ref 0.2–1.3)
BUN SERPL-MCNC: 11 MG/DL (ref 9–20)
BUN SERPL-MCNC: 12 MG/DL (ref 9–20)
CALCIUM SPEC-MCNC: 8.2 MG/DL (ref 8.4–10.2)
CALCIUM SPEC-MCNC: 8.2 MG/DL (ref 8.4–10.2)
CHLORIDE SPEC-SCNC: 101 MMOL/L (ref 98–107)
CHLORIDE SPEC-SCNC: 104 MMOL/L (ref 98–107)
CO2 SERPL-SCNC: 25 MMOL/L (ref 22–30)
CO2 SERPL-SCNC: 30 MMOL/L (ref 22–30)
CREAT BLD-SCNC: 0.6 MG/DL (ref 0.66–1.25)
CREAT BLD-SCNC: 0.6 MG/DL (ref 0.66–1.25)
CREATININE CLEARANCE ESTIMATED: 146 ML/MIN (ref 50–200)
CREATININE CLEARANCE ESTIMATED: 261 ML/MIN (ref 50–200)
ESTIMATED GLOMERULAR FILT RATE: 144 ML/MIN (ref 60–?)
ESTIMATED GLOMERULAR FILT RATE: 144 ML/MIN (ref 60–?)
GFR (AFRICAN AMERICAN): 174 ML/MIN (ref 60–?)
GFR (AFRICAN AMERICAN): 174 ML/MIN (ref 60–?)
GLOBULIN SER CALC-MCNC: 2.9 G/DL (ref 1.3–3.2)
GLOBULIN SER CALC-MCNC: 3.2 G/DL (ref 1.3–3.2)
GLUCOSE: 101 MG/DL (ref 74–100)
GLUCOSE: 175 MG/DL (ref 74–100)
HCT VFR BLD CALC: 31 % (ref 42–52)
HGB BLD-MCNC: 10.5 G/DL (ref 14.1–18)
INR PPP: 1.2 (ref 0.9–1.1)
MCHC RBC-ENTMCNC: 33.7 G/DL (ref 31.8–35.4)
MCV RBC: 103.7 FL (ref 80–94)
MEAN CORPUSCULAR HEMOGLOBIN: 35 PG (ref 27–31.2)
NT PRO BRAIN NATRIURETIC PEP.: 180 PG/ML (ref 0–125)
PLATELET # BLD: 62 K/MM3 (ref 142–424)
POTASSIUM: 2.6 MMOL/L (ref 3.5–5.1)
POTASSIUM: 2.7 MMOL/L (ref 3.5–5.1)
PROT SERPL-MCNC: 5.7 G/DL (ref 6.3–8.2)
PROT SERPL-MCNC: 6.1 G/DL (ref 6.3–8.2)
PT BLD: 14 SECONDS (ref 10.1–12.5)
RBC # BLD AUTO: 2.99 M/MM3 (ref 4.6–6.2)
SODIUM SPEC-SCNC: 136 MMOL/L (ref 136–145)
SODIUM SPEC-SCNC: 137 MMOL/L (ref 136–145)
WBC # BLD AUTO: 3.4 K/MM3 (ref 4.8–10.8)

## 2021-09-17 PROCEDURE — 80048 BASIC METABOLIC PNL TOTAL CA: CPT

## 2021-09-17 PROCEDURE — 71045 X-RAY EXAM CHEST 1 VIEW: CPT

## 2021-09-17 PROCEDURE — 36415 COLL VENOUS BLD VENIPUNCTURE: CPT

## 2021-09-17 PROCEDURE — 96365 THER/PROPH/DIAG IV INF INIT: CPT

## 2021-09-17 PROCEDURE — 87205 SMEAR GRAM STAIN: CPT

## 2021-09-17 PROCEDURE — 96375 TX/PRO/DX INJ NEW DRUG ADDON: CPT

## 2021-09-17 PROCEDURE — 99284 EMERGENCY DEPT VISIT MOD MDM: CPT

## 2021-09-17 PROCEDURE — 87077 CULTURE AEROBIC IDENTIFY: CPT

## 2021-09-17 PROCEDURE — 93970 EXTREMITY STUDY: CPT

## 2021-09-17 PROCEDURE — C9803 HOPD COVID-19 SPEC COLLECT: HCPCS

## 2021-09-17 PROCEDURE — 87186 SC STD MICRODIL/AGAR DIL: CPT

## 2021-09-17 PROCEDURE — 85025 COMPLETE CBC W/AUTO DIFF WBC: CPT

## 2021-09-17 PROCEDURE — 85730 THROMBOPLASTIN TIME PARTIAL: CPT

## 2021-09-17 PROCEDURE — 80053 COMPREHEN METABOLIC PANEL: CPT

## 2021-09-17 PROCEDURE — 87070 CULTURE OTHR SPECIMN AEROBIC: CPT

## 2021-09-17 PROCEDURE — U0005 INFEC AGEN DETEC AMPLI PROBE: HCPCS

## 2021-09-17 PROCEDURE — 85610 PROTHROMBIN TIME: CPT

## 2021-09-17 PROCEDURE — 80202 ASSAY OF VANCOMYCIN: CPT

## 2021-09-17 PROCEDURE — 83880 ASSAY OF NATRIURETIC PEPTIDE: CPT

## 2021-09-17 PROCEDURE — U0003 INFECTIOUS AGENT DETECTION BY NUCLEIC ACID (DNA OR RNA); SEVERE ACUTE RESPIRATORY SYNDROME CORONAVIRUS 2 (SARS-COV-2) (CORONAVIRUS DISEASE [COVID-19]), AMPLIFIED PROBE TECHNIQUE, MAKING USE OF HIGH THROUGHPUT TECHNOLOGIES AS DESCRIBED BY CMS-2020-01-R: HCPCS

## 2021-09-17 PROCEDURE — 83605 ASSAY OF LACTIC ACID: CPT

## 2021-09-17 PROCEDURE — 82140 ASSAY OF AMMONIA: CPT

## 2021-09-17 PROCEDURE — 87040 BLOOD CULTURE FOR BACTERIA: CPT

## 2021-09-18 VITALS
SYSTOLIC BLOOD PRESSURE: 160 MMHG | TEMPERATURE: 98.06 F | HEART RATE: 91 BPM | RESPIRATION RATE: 18 BRPM | DIASTOLIC BLOOD PRESSURE: 89 MMHG | OXYGEN SATURATION: 100 %

## 2021-09-18 VITALS
RESPIRATION RATE: 18 BRPM | TEMPERATURE: 98.1 F | DIASTOLIC BLOOD PRESSURE: 81 MMHG | SYSTOLIC BLOOD PRESSURE: 166 MMHG | HEART RATE: 87 BPM | OXYGEN SATURATION: 97 %

## 2021-09-18 VITALS
TEMPERATURE: 97.9 F | OXYGEN SATURATION: 100 % | HEART RATE: 94 BPM | RESPIRATION RATE: 12 BRPM | SYSTOLIC BLOOD PRESSURE: 142 MMHG | DIASTOLIC BLOOD PRESSURE: 87 MMHG

## 2021-09-18 VITALS
TEMPERATURE: 98.5 F | HEART RATE: 90 BPM | DIASTOLIC BLOOD PRESSURE: 73 MMHG | SYSTOLIC BLOOD PRESSURE: 148 MMHG | OXYGEN SATURATION: 95 % | RESPIRATION RATE: 14 BRPM

## 2021-09-18 VITALS
OXYGEN SATURATION: 96 % | SYSTOLIC BLOOD PRESSURE: 154 MMHG | RESPIRATION RATE: 19 BRPM | DIASTOLIC BLOOD PRESSURE: 78 MMHG | HEART RATE: 73 BPM | TEMPERATURE: 97.88 F

## 2021-09-18 VITALS
TEMPERATURE: 98.24 F | DIASTOLIC BLOOD PRESSURE: 77 MMHG | RESPIRATION RATE: 18 BRPM | SYSTOLIC BLOOD PRESSURE: 167 MMHG | HEART RATE: 95 BPM | OXYGEN SATURATION: 100 %

## 2021-09-18 LAB
ALBUMIN LEVEL: 2.6 G/DL (ref 3.5–5)
ALBUMIN/GLOB SERPL: 0.9 {RATIO} (ref 1.1–1.8)
ALP ISO SERPL-ACNC: 136 U/L (ref 38–126)
ALT SERPLBLD-CCNC: 32 U/L (ref 12–78)
ANION GAP SERPL CALC-SCNC: 6.8 MEQ/L (ref 5–15)
AST SERPL QL: 49 U/L (ref 17–59)
BILIRUBIN,TOTAL: 3.6 MG/DL (ref 0.2–1.3)
BUN SERPL-MCNC: 9 MG/DL (ref 9–20)
CALCIUM SPEC-MCNC: 8.1 MG/DL (ref 8.4–10.2)
CHLORIDE SPEC-SCNC: 100 MMOL/L (ref 98–107)
CO2 SERPL-SCNC: 32 MMOL/L (ref 22–30)
CREAT BLD-SCNC: 0.5 MG/DL (ref 0.66–1.25)
CREATININE CLEARANCE ESTIMATED: 175 ML/MIN (ref 50–200)
ESTIMATED GLOMERULAR FILT RATE: 177 ML/MIN (ref 60–?)
GFR (AFRICAN AMERICAN): 215 ML/MIN (ref 60–?)
GLOBULIN SER CALC-MCNC: 3 G/DL (ref 1.3–3.2)
GLUCOSE: 116 MG/DL (ref 74–100)
HCT VFR BLD CALC: 32.1 % (ref 42–52)
HGB BLD-MCNC: 10.7 G/DL (ref 14.1–18)
MCHC RBC-ENTMCNC: 33.3 G/DL (ref 31.8–35.4)
MCV RBC: 104.9 FL (ref 80–94)
MEAN CORPUSCULAR HEMOGLOBIN: 34.9 PG (ref 27–31.2)
PLATELET # BLD: 56 K/MM3 (ref 142–424)
POTASSIUM: 2.8 MMOL/L (ref 3.5–5.1)
PROT SERPL-MCNC: 5.6 G/DL (ref 6.3–8.2)
RBC # BLD AUTO: 3.06 M/MM3 (ref 4.6–6.2)
SODIUM SPEC-SCNC: 136 MMOL/L (ref 136–145)
VANCOMYCIN PEAK SERPL-MCNC: 14.4 UG/ML (ref 11–39)
VANCOMYCIN TROUGH SERPL-MCNC: 18.8 UG/ML (ref 5–10)
WBC # BLD AUTO: 2.4 K/MM3 (ref 4.8–10.8)

## 2021-09-19 VITALS
SYSTOLIC BLOOD PRESSURE: 152 MMHG | DIASTOLIC BLOOD PRESSURE: 78 MMHG | OXYGEN SATURATION: 96 % | HEART RATE: 104 BPM | RESPIRATION RATE: 18 BRPM | TEMPERATURE: 97.9 F

## 2021-09-19 VITALS
OXYGEN SATURATION: 98 % | RESPIRATION RATE: 20 BRPM | TEMPERATURE: 98.7 F | DIASTOLIC BLOOD PRESSURE: 94 MMHG | SYSTOLIC BLOOD PRESSURE: 164 MMHG | HEART RATE: 103 BPM

## 2021-09-19 VITALS
DIASTOLIC BLOOD PRESSURE: 80 MMHG | TEMPERATURE: 98.3 F | OXYGEN SATURATION: 98 % | HEART RATE: 98 BPM | SYSTOLIC BLOOD PRESSURE: 145 MMHG | RESPIRATION RATE: 17 BRPM

## 2021-09-19 VITALS
SYSTOLIC BLOOD PRESSURE: 135 MMHG | DIASTOLIC BLOOD PRESSURE: 61 MMHG | RESPIRATION RATE: 17 BRPM | TEMPERATURE: 98.1 F | OXYGEN SATURATION: 98 % | HEART RATE: 106 BPM

## 2021-09-19 VITALS
DIASTOLIC BLOOD PRESSURE: 84 MMHG | SYSTOLIC BLOOD PRESSURE: 152 MMHG | RESPIRATION RATE: 18 BRPM | OXYGEN SATURATION: 99 % | HEART RATE: 90 BPM | TEMPERATURE: 98.06 F

## 2021-09-19 LAB
ANION GAP SERPL CALC-SCNC: 8.6 MEQ/L (ref 5–15)
BUN SERPL-MCNC: 9 MG/DL (ref 9–20)
CALCIUM SPEC-MCNC: 8.1 MG/DL (ref 8.4–10.2)
CHLORIDE SPEC-SCNC: 100 MMOL/L (ref 98–107)
CO2 SERPL-SCNC: 29 MMOL/L (ref 22–30)
CREAT BLD-SCNC: 0.6 MG/DL (ref 0.66–1.25)
CREATININE CLEARANCE ESTIMATED: 146 ML/MIN (ref 50–200)
ESTIMATED GLOMERULAR FILT RATE: 144 ML/MIN (ref 60–?)
GFR (AFRICAN AMERICAN): 174 ML/MIN (ref 60–?)
GLUCOSE: 100 MG/DL (ref 74–100)
HCT VFR BLD CALC: 32.7 % (ref 42–52)
HGB BLD-MCNC: 10.6 G/DL (ref 14.1–18)
MCHC RBC-ENTMCNC: 32.4 G/DL (ref 31.8–35.4)
MCV RBC: 104.9 FL (ref 80–94)
MEAN CORPUSCULAR HEMOGLOBIN: 34 PG (ref 27–31.2)
PLATELET # BLD: 60 K/MM3 (ref 142–424)
POTASSIUM: 2.6 MMOL/L (ref 3.5–5.1)
RBC # BLD AUTO: 3.12 M/MM3 (ref 4.6–6.2)
SODIUM SPEC-SCNC: 135 MMOL/L (ref 136–145)
WBC # BLD AUTO: 2.2 K/MM3 (ref 4.8–10.8)

## 2021-09-20 VITALS
HEART RATE: 95 BPM | TEMPERATURE: 98.4 F | SYSTOLIC BLOOD PRESSURE: 150 MMHG | RESPIRATION RATE: 18 BRPM | DIASTOLIC BLOOD PRESSURE: 73 MMHG | OXYGEN SATURATION: 98 %

## 2021-09-20 VITALS
SYSTOLIC BLOOD PRESSURE: 148 MMHG | OXYGEN SATURATION: 100 % | HEART RATE: 78 BPM | TEMPERATURE: 98.24 F | DIASTOLIC BLOOD PRESSURE: 82 MMHG | RESPIRATION RATE: 20 BRPM

## 2021-09-20 VITALS
DIASTOLIC BLOOD PRESSURE: 98 MMHG | TEMPERATURE: 98.06 F | OXYGEN SATURATION: 100 % | HEART RATE: 95 BPM | SYSTOLIC BLOOD PRESSURE: 154 MMHG | RESPIRATION RATE: 18 BRPM

## 2021-09-20 LAB
ANION GAP SERPL CALC-SCNC: 8.3 MEQ/L (ref 5–15)
BUN SERPL-MCNC: 11 MG/DL (ref 9–20)
CALCIUM SPEC-MCNC: 7.8 MG/DL (ref 8.4–10.2)
CHLORIDE SPEC-SCNC: 101 MMOL/L (ref 98–107)
CO2 SERPL-SCNC: 30 MMOL/L (ref 22–30)
CREAT BLD-SCNC: 0.7 MG/DL (ref 0.66–1.25)
CREATININE CLEARANCE ESTIMATED: 125 ML/MIN (ref 50–200)
ESTIMATED GLOMERULAR FILT RATE: 120 ML/MIN (ref 60–?)
GFR (AFRICAN AMERICAN): 146 ML/MIN (ref 60–?)
GLUCOSE: 117 MG/DL (ref 74–100)
HCT VFR BLD CALC: 32.5 % (ref 42–52)
HGB BLD-MCNC: 10.7 G/DL (ref 14.1–18)
MCHC RBC-ENTMCNC: 32.9 G/DL (ref 31.8–35.4)
MCV RBC: 105.7 FL (ref 80–94)
MEAN CORPUSCULAR HEMOGLOBIN: 34.8 PG (ref 27–31.2)
PLATELET # BLD: 69 K/MM3 (ref 142–424)
POTASSIUM: 3.3 MMOL/L (ref 3.5–5.1)
RBC # BLD AUTO: 3.07 M/MM3 (ref 4.6–6.2)
SODIUM SPEC-SCNC: 136 MMOL/L (ref 136–145)
VANCOMYCIN TROUGH SERPL-MCNC: 15.4 UG/ML (ref 5–10)
WBC # BLD AUTO: 2.8 K/MM3 (ref 4.8–10.8)

## 2021-09-23 ENCOUNTER — HOSPITAL ENCOUNTER (OUTPATIENT)
Age: 46
End: 2021-09-23
Payer: MEDICARE

## 2021-09-23 DIAGNOSIS — L03.116: ICD-10-CM

## 2021-09-23 DIAGNOSIS — T14.8XXA: Primary | ICD-10-CM

## 2021-09-23 PROCEDURE — 87205 SMEAR GRAM STAIN: CPT

## 2021-09-23 PROCEDURE — 87186 SC STD MICRODIL/AGAR DIL: CPT

## 2021-09-23 PROCEDURE — 87070 CULTURE OTHR SPECIMN AEROBIC: CPT

## 2021-09-23 PROCEDURE — 87077 CULTURE AEROBIC IDENTIFY: CPT

## 2021-10-15 ENCOUNTER — HOSPITAL ENCOUNTER (OUTPATIENT)
Age: 46
End: 2021-10-15
Payer: MEDICARE

## 2021-10-15 DIAGNOSIS — L03.116: ICD-10-CM

## 2021-10-15 DIAGNOSIS — T14.8XXA: Primary | ICD-10-CM

## 2021-10-15 PROCEDURE — 87077 CULTURE AEROBIC IDENTIFY: CPT

## 2021-10-15 PROCEDURE — 87205 SMEAR GRAM STAIN: CPT

## 2021-10-15 PROCEDURE — 87186 SC STD MICRODIL/AGAR DIL: CPT

## 2021-10-15 PROCEDURE — 87070 CULTURE OTHR SPECIMN AEROBIC: CPT

## 2021-10-28 ENCOUNTER — HOSPITAL ENCOUNTER (OUTPATIENT)
Age: 46
End: 2021-10-28
Payer: MEDICARE

## 2021-10-28 DIAGNOSIS — Z91.19: Primary | ICD-10-CM

## 2021-10-28 PROCEDURE — 80305 DRUG TEST PRSMV DIR OPT OBS: CPT

## 2021-11-16 ENCOUNTER — HOSPITAL ENCOUNTER (OUTPATIENT)
Dept: HOSPITAL 22 - PT | Age: 46
Discharge: HOME | End: 2021-11-16
Payer: MEDICARE

## 2021-11-16 DIAGNOSIS — L03.116: Primary | ICD-10-CM

## 2021-11-16 DIAGNOSIS — L97.922: ICD-10-CM

## 2021-11-16 PROCEDURE — 97161 PT EVAL LOW COMPLEX 20 MIN: CPT

## 2021-11-16 PROCEDURE — 97597 DBRDMT OPN WND 1ST 20 CM/<: CPT

## 2021-11-16 PROCEDURE — 29580 STRAPPING UNNA BOOT: CPT

## 2021-12-02 ENCOUNTER — HOSPITAL ENCOUNTER (EMERGENCY)
Dept: HOSPITAL 22 - UTC | Age: 46
Discharge: LEFT BEFORE DISCHARGE | End: 2021-12-02
Payer: MEDICARE

## 2021-12-02 VITALS
SYSTOLIC BLOOD PRESSURE: 163 MMHG | DIASTOLIC BLOOD PRESSURE: 94 MMHG | RESPIRATION RATE: 18 BRPM | TEMPERATURE: 98.24 F | OXYGEN SATURATION: 96 % | HEART RATE: 95 BPM

## 2021-12-02 VITALS
TEMPERATURE: 98.2 F | OXYGEN SATURATION: 98 % | HEART RATE: 104 BPM | DIASTOLIC BLOOD PRESSURE: 94 MMHG | RESPIRATION RATE: 18 BRPM | SYSTOLIC BLOOD PRESSURE: 163 MMHG

## 2021-12-02 VITALS — BODY MASS INDEX: 39.9 KG/M2 | BODY MASS INDEX: 34 KG/M2

## 2021-12-02 VITALS
HEART RATE: 98 BPM | SYSTOLIC BLOOD PRESSURE: 168 MMHG | OXYGEN SATURATION: 96 % | DIASTOLIC BLOOD PRESSURE: 101 MMHG | RESPIRATION RATE: 28 BRPM

## 2021-12-02 DIAGNOSIS — L97.922: ICD-10-CM

## 2021-12-02 DIAGNOSIS — E03.9: ICD-10-CM

## 2021-12-02 DIAGNOSIS — J44.9: ICD-10-CM

## 2021-12-02 DIAGNOSIS — Z20.822: ICD-10-CM

## 2021-12-02 DIAGNOSIS — L03.116: Primary | ICD-10-CM

## 2021-12-02 DIAGNOSIS — I10: ICD-10-CM

## 2021-12-02 PROCEDURE — 99283 EMERGENCY DEPT VISIT LOW MDM: CPT

## 2021-12-02 PROCEDURE — U0005 INFEC AGEN DETEC AMPLI PROBE: HCPCS

## 2021-12-02 PROCEDURE — 71046 X-RAY EXAM CHEST 2 VIEWS: CPT

## 2021-12-02 PROCEDURE — U0003 INFECTIOUS AGENT DETECTION BY NUCLEIC ACID (DNA OR RNA); SEVERE ACUTE RESPIRATORY SYNDROME CORONAVIRUS 2 (SARS-COV-2) (CORONAVIRUS DISEASE [COVID-19]), AMPLIFIED PROBE TECHNIQUE, MAKING USE OF HIGH THROUGHPUT TECHNOLOGIES AS DESCRIBED BY CMS-2020-01-R: HCPCS

## 2021-12-02 PROCEDURE — C9803 HOPD COVID-19 SPEC COLLECT: HCPCS

## 2021-12-13 ENCOUNTER — HOSPITAL ENCOUNTER (OUTPATIENT)
Dept: HOSPITAL 22 - ER | Age: 46
Setting detail: OBSERVATION
LOS: 2 days | Discharge: HOME | End: 2021-12-15
Payer: MEDICARE

## 2021-12-13 VITALS
OXYGEN SATURATION: 96 % | SYSTOLIC BLOOD PRESSURE: 146 MMHG | HEART RATE: 78 BPM | RESPIRATION RATE: 18 BRPM | DIASTOLIC BLOOD PRESSURE: 82 MMHG

## 2021-12-13 VITALS
SYSTOLIC BLOOD PRESSURE: 142 MMHG | HEART RATE: 74 BPM | RESPIRATION RATE: 16 BRPM | DIASTOLIC BLOOD PRESSURE: 74 MMHG | OXYGEN SATURATION: 98 % | TEMPERATURE: 98.2 F

## 2021-12-13 VITALS
BODY MASS INDEX: 42.3 KG/M2 | BODY MASS INDEX: 39.2 KG/M2 | DIASTOLIC BLOOD PRESSURE: 101 MMHG | BODY MASS INDEX: 39.4 KG/M2 | RESPIRATION RATE: 20 BRPM | BODY MASS INDEX: 39.9 KG/M2 | HEART RATE: 85 BPM | BODY MASS INDEX: 40.8 KG/M2 | SYSTOLIC BLOOD PRESSURE: 166 MMHG | OXYGEN SATURATION: 99 % | TEMPERATURE: 98.3 F

## 2021-12-13 VITALS
RESPIRATION RATE: 19 BRPM | DIASTOLIC BLOOD PRESSURE: 82 MMHG | SYSTOLIC BLOOD PRESSURE: 146 MMHG | HEART RATE: 87 BPM | OXYGEN SATURATION: 98 %

## 2021-12-13 VITALS
RESPIRATION RATE: 19 BRPM | HEART RATE: 66 BPM | SYSTOLIC BLOOD PRESSURE: 128 MMHG | OXYGEN SATURATION: 97 % | DIASTOLIC BLOOD PRESSURE: 74 MMHG | TEMPERATURE: 98 F

## 2021-12-13 DIAGNOSIS — Z20.822: ICD-10-CM

## 2021-12-13 DIAGNOSIS — G89.28: ICD-10-CM

## 2021-12-13 DIAGNOSIS — J44.9: ICD-10-CM

## 2021-12-13 DIAGNOSIS — I11.0: ICD-10-CM

## 2021-12-13 DIAGNOSIS — L03.116: ICD-10-CM

## 2021-12-13 DIAGNOSIS — E66.01: ICD-10-CM

## 2021-12-13 DIAGNOSIS — J18.9: Primary | ICD-10-CM

## 2021-12-13 DIAGNOSIS — Z79.899: ICD-10-CM

## 2021-12-13 DIAGNOSIS — I50.9: ICD-10-CM

## 2021-12-13 LAB
ALBUMIN LEVEL: 3.3 G/DL (ref 3.5–5)
ALBUMIN/GLOB SERPL: 0.8 {RATIO} (ref 1.1–1.8)
ALP ISO SERPL-ACNC: 196 U/L (ref 38–126)
ALT SERPLBLD-CCNC: 34 U/L (ref 12–78)
AMMONIA: 31 UMOL/L (ref 9–30)
ANION GAP SERPL CALC-SCNC: 8.5 MEQ/L (ref 5–15)
AST SERPL QL: 63 U/L (ref 17–59)
BILIRUBIN,TOTAL: 1.6 MG/DL (ref 0.2–1.3)
BUN SERPL-MCNC: 10 MG/DL (ref 9–20)
CALCIUM SPEC-MCNC: 8.5 MG/DL (ref 8.4–10.2)
CHLORIDE SPEC-SCNC: 103 MMOL/L (ref 98–107)
CO2 SERPL-SCNC: 31 MMOL/L (ref 22–30)
COLOR UR: (no result)
CREAT BLD-SCNC: 0.6 MG/DL (ref 0.66–1.25)
CREATININE CLEARANCE ESTIMATED: 261 ML/MIN (ref 50–200)
ESTIMATED GLOMERULAR FILT RATE: 144 ML/MIN (ref 60–?)
GFR (AFRICAN AMERICAN): 174 ML/MIN (ref 60–?)
GLOBULIN SER CALC-MCNC: 4.3 G/DL (ref 1.3–3.2)
GLUCOSE: 88 MG/DL (ref 74–100)
HCT VFR BLD CALC: 36 % (ref 42–52)
HGB BLD-MCNC: 11.2 G/DL (ref 14.1–18)
MCHC RBC-ENTMCNC: 31.2 G/DL (ref 31.8–35.4)
MCV RBC: 93 FL (ref 80–94)
MEAN CORPUSCULAR HEMOGLOBIN: 29 PG (ref 27–31.2)
MICRO URNS: (no result)
NT PRO BRAIN NATRIURETIC PEP.: 891 PG/ML (ref 0–125)
PH UR: 7 [PH] (ref 5–8.5)
PLATELET # BLD: 113 K/MM3 (ref 142–424)
POTASSIUM: 3.5 MMOL/L (ref 3.5–5.1)
PROT SERPL-MCNC: 7.6 G/DL (ref 6.3–8.2)
RBC # BLD AUTO: 3.87 M/MM3 (ref 4.6–6.2)
SODIUM SPEC-SCNC: 139 MMOL/L (ref 136–145)
SP GR UR: 1.01 (ref 1–1.03)
TROPONIN I: 0.03 NG/ML (ref 0–0.03)
UROBILINOGEN UR QL: 0.2 EU/DL
WBC # BLD AUTO: 5.5 K/MM3 (ref 4.8–10.8)

## 2021-12-13 PROCEDURE — 93306 TTE W/DOPPLER COMPLETE: CPT

## 2021-12-13 PROCEDURE — G0378 HOSPITAL OBSERVATION PER HR: HCPCS

## 2021-12-13 PROCEDURE — 87040 BLOOD CULTURE FOR BACTERIA: CPT

## 2021-12-13 PROCEDURE — C1725 CATH, TRANSLUMIN NON-LASER: HCPCS

## 2021-12-13 PROCEDURE — 99284 EMERGENCY DEPT VISIT MOD MDM: CPT

## 2021-12-13 PROCEDURE — 87081 CULTURE SCREEN ONLY: CPT

## 2021-12-13 PROCEDURE — 73590 X-RAY EXAM OF LOWER LEG: CPT

## 2021-12-13 PROCEDURE — 93005 ELECTROCARDIOGRAM TRACING: CPT

## 2021-12-13 PROCEDURE — 99152 MOD SED SAME PHYS/QHP 5/>YRS: CPT

## 2021-12-13 PROCEDURE — 71045 X-RAY EXAM CHEST 1 VIEW: CPT

## 2021-12-13 PROCEDURE — 81001 URINALYSIS AUTO W/SCOPE: CPT

## 2021-12-13 PROCEDURE — 82140 ASSAY OF AMMONIA: CPT

## 2021-12-13 PROCEDURE — U0005 INFEC AGEN DETEC AMPLI PROBE: HCPCS

## 2021-12-13 PROCEDURE — 80053 COMPREHEN METABOLIC PANEL: CPT

## 2021-12-13 PROCEDURE — 96365 THER/PROPH/DIAG IV INF INIT: CPT

## 2021-12-13 PROCEDURE — 36415 COLL VENOUS BLD VENIPUNCTURE: CPT

## 2021-12-13 PROCEDURE — 96375 TX/PRO/DX INJ NEW DRUG ADDON: CPT

## 2021-12-13 PROCEDURE — 83880 ASSAY OF NATRIURETIC PEPTIDE: CPT

## 2021-12-13 PROCEDURE — 85025 COMPLETE CBC W/AUTO DIFF WBC: CPT

## 2021-12-13 PROCEDURE — 84484 ASSAY OF TROPONIN QUANT: CPT

## 2021-12-13 PROCEDURE — 93458 L HRT ARTERY/VENTRICLE ANGIO: CPT

## 2021-12-13 PROCEDURE — C1760 CLOSURE DEV, VASC: HCPCS

## 2021-12-13 PROCEDURE — C9803 HOPD COVID-19 SPEC COLLECT: HCPCS

## 2021-12-13 PROCEDURE — C1769 GUIDE WIRE: HCPCS

## 2021-12-13 PROCEDURE — U0003 INFECTIOUS AGENT DETECTION BY NUCLEIC ACID (DNA OR RNA); SEVERE ACUTE RESPIRATORY SYNDROME CORONAVIRUS 2 (SARS-COV-2) (CORONAVIRUS DISEASE [COVID-19]), AMPLIFIED PROBE TECHNIQUE, MAKING USE OF HIGH THROUGHPUT TECHNOLOGIES AS DESCRIBED BY CMS-2020-01-R: HCPCS

## 2021-12-14 VITALS
RESPIRATION RATE: 17 BRPM | SYSTOLIC BLOOD PRESSURE: 139 MMHG | DIASTOLIC BLOOD PRESSURE: 80 MMHG | OXYGEN SATURATION: 98 % | HEART RATE: 75 BPM

## 2021-12-14 VITALS — OXYGEN SATURATION: 96 % | SYSTOLIC BLOOD PRESSURE: 115 MMHG | HEART RATE: 145 BPM | DIASTOLIC BLOOD PRESSURE: 93 MMHG

## 2021-12-14 VITALS
TEMPERATURE: 97.9 F | RESPIRATION RATE: 17 BRPM | DIASTOLIC BLOOD PRESSURE: 82 MMHG | SYSTOLIC BLOOD PRESSURE: 164 MMHG | OXYGEN SATURATION: 96 % | HEART RATE: 66 BPM

## 2021-12-14 VITALS
HEART RATE: 79 BPM | SYSTOLIC BLOOD PRESSURE: 141 MMHG | RESPIRATION RATE: 18 BRPM | DIASTOLIC BLOOD PRESSURE: 71 MMHG | OXYGEN SATURATION: 93 % | TEMPERATURE: 97.88 F

## 2021-12-14 VITALS
SYSTOLIC BLOOD PRESSURE: 157 MMHG | DIASTOLIC BLOOD PRESSURE: 86 MMHG | TEMPERATURE: 98.78 F | OXYGEN SATURATION: 98 % | RESPIRATION RATE: 16 BRPM | HEART RATE: 67 BPM

## 2021-12-14 VITALS
HEART RATE: 82 BPM | DIASTOLIC BLOOD PRESSURE: 86 MMHG | OXYGEN SATURATION: 99 % | SYSTOLIC BLOOD PRESSURE: 146 MMHG | RESPIRATION RATE: 18 BRPM

## 2021-12-14 VITALS
TEMPERATURE: 97.88 F | SYSTOLIC BLOOD PRESSURE: 136 MMHG | HEART RATE: 66 BPM | DIASTOLIC BLOOD PRESSURE: 68 MMHG | OXYGEN SATURATION: 99 % | RESPIRATION RATE: 16 BRPM

## 2021-12-14 VITALS — HEART RATE: 81 BPM | OXYGEN SATURATION: 94 % | DIASTOLIC BLOOD PRESSURE: 51 MMHG | SYSTOLIC BLOOD PRESSURE: 98 MMHG

## 2021-12-14 VITALS
SYSTOLIC BLOOD PRESSURE: 142 MMHG | DIASTOLIC BLOOD PRESSURE: 85 MMHG | HEART RATE: 72 BPM | OXYGEN SATURATION: 97 % | RESPIRATION RATE: 18 BRPM

## 2021-12-14 VITALS
HEART RATE: 93 BPM | OXYGEN SATURATION: 97 % | DIASTOLIC BLOOD PRESSURE: 79 MMHG | RESPIRATION RATE: 17 BRPM | SYSTOLIC BLOOD PRESSURE: 131 MMHG

## 2021-12-14 VITALS — HEART RATE: 89 BPM

## 2021-12-14 VITALS
HEART RATE: 59 BPM | DIASTOLIC BLOOD PRESSURE: 57 MMHG | SYSTOLIC BLOOD PRESSURE: 110 MMHG | RESPIRATION RATE: 17 BRPM | OXYGEN SATURATION: 96 %

## 2021-12-14 VITALS
HEART RATE: 86 BPM | DIASTOLIC BLOOD PRESSURE: 84 MMHG | RESPIRATION RATE: 18 BRPM | OXYGEN SATURATION: 98 % | SYSTOLIC BLOOD PRESSURE: 149 MMHG

## 2021-12-14 VITALS
DIASTOLIC BLOOD PRESSURE: 49 MMHG | SYSTOLIC BLOOD PRESSURE: 99 MMHG | OXYGEN SATURATION: 98 % | RESPIRATION RATE: 16 BRPM | HEART RATE: 77 BPM

## 2021-12-14 VITALS — DIASTOLIC BLOOD PRESSURE: 54 MMHG | SYSTOLIC BLOOD PRESSURE: 115 MMHG | HEART RATE: 86 BPM | OXYGEN SATURATION: 93 %

## 2021-12-14 VITALS — HEART RATE: 64 BPM | DIASTOLIC BLOOD PRESSURE: 58 MMHG | SYSTOLIC BLOOD PRESSURE: 119 MMHG | OXYGEN SATURATION: 96 %

## 2021-12-14 VITALS — HEART RATE: 62 BPM | DIASTOLIC BLOOD PRESSURE: 67 MMHG | SYSTOLIC BLOOD PRESSURE: 120 MMHG | RESPIRATION RATE: 17 BRPM

## 2021-12-15 VITALS
RESPIRATION RATE: 18 BRPM | DIASTOLIC BLOOD PRESSURE: 91 MMHG | OXYGEN SATURATION: 92 % | TEMPERATURE: 98.78 F | SYSTOLIC BLOOD PRESSURE: 161 MMHG | HEART RATE: 70 BPM

## 2021-12-15 VITALS
RESPIRATION RATE: 18 BRPM | DIASTOLIC BLOOD PRESSURE: 63 MMHG | TEMPERATURE: 98.6 F | HEART RATE: 81 BPM | SYSTOLIC BLOOD PRESSURE: 129 MMHG | OXYGEN SATURATION: 97 %

## 2021-12-15 VITALS
HEART RATE: 94 BPM | SYSTOLIC BLOOD PRESSURE: 123 MMHG | RESPIRATION RATE: 22 BRPM | OXYGEN SATURATION: 97 % | TEMPERATURE: 98.06 F | DIASTOLIC BLOOD PRESSURE: 79 MMHG

## 2022-01-13 ENCOUNTER — HOSPITAL ENCOUNTER (EMERGENCY)
Age: 47
Discharge: HOME | End: 2022-01-13
Payer: MEDICARE

## 2022-01-13 VITALS
DIASTOLIC BLOOD PRESSURE: 85 MMHG | RESPIRATION RATE: 18 BRPM | SYSTOLIC BLOOD PRESSURE: 164 MMHG | OXYGEN SATURATION: 99 % | HEART RATE: 97 BPM

## 2022-01-13 VITALS
HEART RATE: 107 BPM | SYSTOLIC BLOOD PRESSURE: 170 MMHG | TEMPERATURE: 97.88 F | OXYGEN SATURATION: 97 % | DIASTOLIC BLOOD PRESSURE: 102 MMHG | RESPIRATION RATE: 20 BRPM

## 2022-01-13 VITALS
RESPIRATION RATE: 18 BRPM | DIASTOLIC BLOOD PRESSURE: 78 MMHG | HEART RATE: 78 BPM | TEMPERATURE: 97.88 F | OXYGEN SATURATION: 94 % | SYSTOLIC BLOOD PRESSURE: 138 MMHG

## 2022-01-13 VITALS
DIASTOLIC BLOOD PRESSURE: 102 MMHG | OXYGEN SATURATION: 100 % | RESPIRATION RATE: 18 BRPM | SYSTOLIC BLOOD PRESSURE: 170 MMHG | HEART RATE: 101 BPM

## 2022-01-13 VITALS — BODY MASS INDEX: 41.3 KG/M2

## 2022-01-13 DIAGNOSIS — Z20.822: ICD-10-CM

## 2022-01-13 DIAGNOSIS — Z79.899: ICD-10-CM

## 2022-01-13 DIAGNOSIS — J44.9: ICD-10-CM

## 2022-01-13 DIAGNOSIS — Z87.891: ICD-10-CM

## 2022-01-13 DIAGNOSIS — L03.116: Primary | ICD-10-CM

## 2022-01-13 DIAGNOSIS — I10: ICD-10-CM

## 2022-01-13 DIAGNOSIS — E03.9: ICD-10-CM

## 2022-01-13 LAB
ALBUMIN LEVEL: 3.2 G/DL (ref 3.5–5)
ALBUMIN/GLOB SERPL: 0.8 {RATIO} (ref 1.1–1.8)
ALP ISO SERPL-ACNC: 206 U/L (ref 38–126)
ALT SERPLBLD-CCNC: 30 U/L (ref 12–78)
ANION GAP SERPL CALC-SCNC: 8.5 MEQ/L (ref 5–15)
AST SERPL QL: 58 U/L (ref 17–59)
BILIRUBIN,TOTAL: 1.9 MG/DL (ref 0.2–1.3)
BUN SERPL-MCNC: 11 MG/DL (ref 9–20)
CALCIUM SPEC-MCNC: 8 MG/DL (ref 8.4–10.2)
CHLORIDE SPEC-SCNC: 105 MMOL/L (ref 98–107)
CO2 SERPL-SCNC: 29 MMOL/L (ref 22–30)
CREAT BLD-SCNC: 0.6 MG/DL (ref 0.66–1.25)
CREATININE CLEARANCE ESTIMATED: 151 ML/MIN (ref 50–200)
ESTIMATED GLOMERULAR FILT RATE: 144 ML/MIN (ref 60–?)
GFR (AFRICAN AMERICAN): 174 ML/MIN (ref 60–?)
GLOBULIN SER CALC-MCNC: 4.2 G/DL (ref 1.3–3.2)
GLUCOSE: 107 MG/DL (ref 74–100)
HCT VFR BLD CALC: 33.9 % (ref 42–52)
HGB BLD-MCNC: 10.7 G/DL (ref 14.1–18)
MCHC RBC-ENTMCNC: 31.5 G/DL (ref 31.8–35.4)
MCV RBC: 93.8 FL (ref 80–94)
MEAN CORPUSCULAR HEMOGLOBIN: 29.5 PG (ref 27–31.2)
PLATELET # BLD: 77 K/MM3 (ref 142–424)
POTASSIUM: 3.5 MMOL/L (ref 3.5–5.1)
PROT SERPL-MCNC: 7.4 G/DL (ref 6.3–8.2)
RBC # BLD AUTO: 3.61 M/MM3 (ref 4.6–6.2)
SODIUM SPEC-SCNC: 139 MMOL/L (ref 136–145)
WBC # BLD AUTO: 3.8 K/MM3 (ref 4.8–10.8)

## 2022-01-13 PROCEDURE — 87040 BLOOD CULTURE FOR BACTERIA: CPT

## 2022-01-13 PROCEDURE — 83605 ASSAY OF LACTIC ACID: CPT

## 2022-01-13 PROCEDURE — 73590 X-RAY EXAM OF LOWER LEG: CPT

## 2022-01-13 PROCEDURE — U0003 INFECTIOUS AGENT DETECTION BY NUCLEIC ACID (DNA OR RNA); SEVERE ACUTE RESPIRATORY SYNDROME CORONAVIRUS 2 (SARS-COV-2) (CORONAVIRUS DISEASE [COVID-19]), AMPLIFIED PROBE TECHNIQUE, MAKING USE OF HIGH THROUGHPUT TECHNOLOGIES AS DESCRIBED BY CMS-2020-01-R: HCPCS

## 2022-01-13 PROCEDURE — 80053 COMPREHEN METABOLIC PANEL: CPT

## 2022-01-13 PROCEDURE — 85025 COMPLETE CBC W/AUTO DIFF WBC: CPT

## 2022-01-13 PROCEDURE — C9803 HOPD COVID-19 SPEC COLLECT: HCPCS

## 2022-01-13 PROCEDURE — 96374 THER/PROPH/DIAG INJ IV PUSH: CPT

## 2022-01-13 PROCEDURE — U0005 INFEC AGEN DETEC AMPLI PROBE: HCPCS

## 2022-01-13 PROCEDURE — 99282 EMERGENCY DEPT VISIT SF MDM: CPT

## 2022-04-05 ENCOUNTER — HOSPITAL ENCOUNTER (OUTPATIENT)
Age: 47
Setting detail: OBSERVATION
LOS: 2 days | Discharge: HOME | End: 2022-04-07
Payer: MEDICARE

## 2022-04-05 VITALS
TEMPERATURE: 98.24 F | HEART RATE: 82 BPM | SYSTOLIC BLOOD PRESSURE: 136 MMHG | DIASTOLIC BLOOD PRESSURE: 83 MMHG | RESPIRATION RATE: 20 BRPM | OXYGEN SATURATION: 98 %

## 2022-04-05 VITALS
SYSTOLIC BLOOD PRESSURE: 135 MMHG | TEMPERATURE: 98.3 F | OXYGEN SATURATION: 99 % | HEART RATE: 84 BPM | RESPIRATION RATE: 16 BRPM | DIASTOLIC BLOOD PRESSURE: 84 MMHG

## 2022-04-05 VITALS — BODY MASS INDEX: 45.7 KG/M2 | BODY MASS INDEX: 45.8 KG/M2

## 2022-04-05 VITALS — RESPIRATION RATE: 22 BRPM | OXYGEN SATURATION: 99 %

## 2022-04-05 DIAGNOSIS — E66.01: ICD-10-CM

## 2022-04-05 DIAGNOSIS — I89.0: ICD-10-CM

## 2022-04-05 DIAGNOSIS — L97.222: ICD-10-CM

## 2022-04-05 DIAGNOSIS — L97.922: ICD-10-CM

## 2022-04-05 DIAGNOSIS — I87.2: ICD-10-CM

## 2022-04-05 DIAGNOSIS — F17.210: ICD-10-CM

## 2022-04-05 DIAGNOSIS — L03.116: ICD-10-CM

## 2022-04-05 DIAGNOSIS — Z20.822: ICD-10-CM

## 2022-04-05 DIAGNOSIS — I83.022: ICD-10-CM

## 2022-04-05 LAB
ALBUMIN LEVEL: 2.8 G/DL (ref 3.5–5)
ALBUMIN/GLOB SERPL: 0.8 {RATIO} (ref 1.1–1.8)
ALP ISO SERPL-ACNC: 223 U/L (ref 38–126)
ALT SERPLBLD-CCNC: 41 U/L (ref 12–78)
AMMONIA: 102 UMOL/L (ref 9–30)
ANION GAP SERPL CALC-SCNC: 5.6 MEQ/L (ref 5–15)
AST SERPL QL: 70 U/L (ref 17–59)
BILIRUBIN,TOTAL: 1.5 MG/DL (ref 0.2–1.3)
BUN SERPL-MCNC: 13 MG/DL (ref 9–20)
CALCIUM SPEC-MCNC: 7.6 MG/DL (ref 8.4–10.2)
CHLORIDE SPEC-SCNC: 109 MMOL/L (ref 98–107)
CO2 SERPL-SCNC: 27 MMOL/L (ref 22–30)
CREAT BLD-SCNC: 0.7 MG/DL (ref 0.66–1.25)
CREATININE CLEARANCE ESTIMATED: 129 ML/MIN (ref 50–200)
ESTIMATED GLOMERULAR FILT RATE: 120 ML/MIN (ref 60–?)
GFR (AFRICAN AMERICAN): 146 ML/MIN (ref 60–?)
GLOBULIN SER CALC-MCNC: 3.7 G/DL (ref 1.3–3.2)
GLUCOSE: 86 MG/DL (ref 74–100)
HCT VFR BLD CALC: 32.5 % (ref 42–52)
HGB BLD-MCNC: 10.5 G/DL (ref 14.1–18)
MCHC RBC-ENTMCNC: 32.3 G/DL (ref 31.8–35.4)
MCV RBC: 95.7 FL (ref 80–94)
MEAN CORPUSCULAR HEMOGLOBIN: 31 PG (ref 27–31.2)
PLATELET # BLD: 104 K/MM3 (ref 142–424)
POTASSIUM: 3.6 MMOL/L (ref 3.5–5.1)
PROT SERPL-MCNC: 6.5 G/DL (ref 6.3–8.2)
RBC # BLD AUTO: 3.4 M/MM3 (ref 4.6–6.2)
SODIUM SPEC-SCNC: 138 MMOL/L (ref 136–145)
WBC # BLD AUTO: 2.9 K/MM3 (ref 4.8–10.8)

## 2022-04-05 PROCEDURE — G0378 HOSPITAL OBSERVATION PER HR: HCPCS

## 2022-04-05 PROCEDURE — 36415 COLL VENOUS BLD VENIPUNCTURE: CPT

## 2022-04-05 PROCEDURE — U0005 INFEC AGEN DETEC AMPLI PROBE: HCPCS

## 2022-04-05 PROCEDURE — 87077 CULTURE AEROBIC IDENTIFY: CPT

## 2022-04-05 PROCEDURE — 73701 CT LOWER EXTREMITY W/DYE: CPT

## 2022-04-05 PROCEDURE — 80048 BASIC METABOLIC PNL TOTAL CA: CPT

## 2022-04-05 PROCEDURE — 87081 CULTURE SCREEN ONLY: CPT

## 2022-04-05 PROCEDURE — 87186 SC STD MICRODIL/AGAR DIL: CPT

## 2022-04-05 PROCEDURE — 82140 ASSAY OF AMMONIA: CPT

## 2022-04-05 PROCEDURE — 87070 CULTURE OTHR SPECIMN AEROBIC: CPT

## 2022-04-05 PROCEDURE — 85025 COMPLETE CBC W/AUTO DIFF WBC: CPT

## 2022-04-05 PROCEDURE — 94640 AIRWAY INHALATION TREATMENT: CPT

## 2022-04-05 PROCEDURE — 80053 COMPREHEN METABOLIC PANEL: CPT

## 2022-04-05 PROCEDURE — U0003 INFECTIOUS AGENT DETECTION BY NUCLEIC ACID (DNA OR RNA); SEVERE ACUTE RESPIRATORY SYNDROME CORONAVIRUS 2 (SARS-COV-2) (CORONAVIRUS DISEASE [COVID-19]), AMPLIFIED PROBE TECHNIQUE, MAKING USE OF HIGH THROUGHPUT TECHNOLOGIES AS DESCRIBED BY CMS-2020-01-R: HCPCS

## 2022-04-05 PROCEDURE — 87040 BLOOD CULTURE FOR BACTERIA: CPT

## 2022-04-05 PROCEDURE — C9803 HOPD COVID-19 SPEC COLLECT: HCPCS

## 2022-04-05 PROCEDURE — 87205 SMEAR GRAM STAIN: CPT

## 2022-04-06 VITALS
HEART RATE: 75 BPM | SYSTOLIC BLOOD PRESSURE: 129 MMHG | RESPIRATION RATE: 18 BRPM | TEMPERATURE: 97.8 F | DIASTOLIC BLOOD PRESSURE: 66 MMHG | OXYGEN SATURATION: 98 %

## 2022-04-06 VITALS
OXYGEN SATURATION: 99 % | RESPIRATION RATE: 18 BRPM | TEMPERATURE: 97.9 F | SYSTOLIC BLOOD PRESSURE: 168 MMHG | HEART RATE: 71 BPM | DIASTOLIC BLOOD PRESSURE: 93 MMHG

## 2022-04-06 VITALS
OXYGEN SATURATION: 95 % | HEART RATE: 91 BPM | TEMPERATURE: 98.06 F | RESPIRATION RATE: 16 BRPM | DIASTOLIC BLOOD PRESSURE: 104 MMHG | SYSTOLIC BLOOD PRESSURE: 152 MMHG

## 2022-04-06 VITALS
RESPIRATION RATE: 22 BRPM | OXYGEN SATURATION: 95 % | TEMPERATURE: 97.52 F | SYSTOLIC BLOOD PRESSURE: 144 MMHG | DIASTOLIC BLOOD PRESSURE: 75 MMHG | HEART RATE: 73 BPM

## 2022-04-06 VITALS — OXYGEN SATURATION: 99 %

## 2022-04-07 VITALS
RESPIRATION RATE: 20 BRPM | HEART RATE: 81 BPM | DIASTOLIC BLOOD PRESSURE: 91 MMHG | TEMPERATURE: 98.6 F | SYSTOLIC BLOOD PRESSURE: 145 MMHG | OXYGEN SATURATION: 95 %

## 2022-04-07 VITALS
OXYGEN SATURATION: 99 % | RESPIRATION RATE: 20 BRPM | TEMPERATURE: 98.06 F | HEART RATE: 78 BPM | SYSTOLIC BLOOD PRESSURE: 150 MMHG | DIASTOLIC BLOOD PRESSURE: 77 MMHG

## 2022-04-07 LAB
ANION GAP SERPL CALC-SCNC: 6.2 MEQ/L (ref 5–15)
BUN SERPL-MCNC: 12 MG/DL (ref 9–20)
CALCIUM SPEC-MCNC: 7.4 MG/DL (ref 8.4–10.2)
CHLORIDE SPEC-SCNC: 106 MMOL/L (ref 98–107)
CO2 SERPL-SCNC: 26 MMOL/L (ref 22–30)
CREAT BLD-SCNC: 0.6 MG/DL (ref 0.66–1.25)
CREATININE CLEARANCE ESTIMATED: 146 ML/MIN (ref 50–200)
ESTIMATED GLOMERULAR FILT RATE: 144 ML/MIN (ref 60–?)
GFR (AFRICAN AMERICAN): 174 ML/MIN (ref 60–?)
GLUCOSE: 94 MG/DL (ref 74–100)
HCT VFR BLD CALC: 34.9 % (ref 42–52)
HGB BLD-MCNC: 11.5 G/DL (ref 14.1–18)
MCHC RBC-ENTMCNC: 33 G/DL (ref 31.8–35.4)
MCV RBC: 92.3 FL (ref 80–94)
MEAN CORPUSCULAR HEMOGLOBIN: 30.5 PG (ref 27–31.2)
PLATELET # BLD: 111 K/MM3 (ref 142–424)
POTASSIUM: 4.2 MMOL/L (ref 3.5–5.1)
RBC # BLD AUTO: 3.78 M/MM3 (ref 4.6–6.2)
SODIUM SPEC-SCNC: 134 MMOL/L (ref 136–145)
WBC # BLD AUTO: 4 K/MM3 (ref 4.8–10.8)

## 2022-04-29 ENCOUNTER — HOSPITAL ENCOUNTER (INPATIENT)
Age: 47
LOS: 4 days | Discharge: HOME HEALTH | DRG: 300 | End: 2022-05-03
Payer: MEDICARE

## 2022-04-29 VITALS — BODY MASS INDEX: 43.4 KG/M2 | BODY MASS INDEX: 43.2 KG/M2 | BODY MASS INDEX: 43.3 KG/M2

## 2022-04-29 VITALS
HEART RATE: 95 BPM | RESPIRATION RATE: 20 BRPM | DIASTOLIC BLOOD PRESSURE: 88 MMHG | SYSTOLIC BLOOD PRESSURE: 153 MMHG | TEMPERATURE: 97.88 F | OXYGEN SATURATION: 99 %

## 2022-04-29 VITALS
OXYGEN SATURATION: 100 % | DIASTOLIC BLOOD PRESSURE: 92 MMHG | RESPIRATION RATE: 20 BRPM | SYSTOLIC BLOOD PRESSURE: 163 MMHG | TEMPERATURE: 98 F | HEART RATE: 90 BPM

## 2022-04-29 VITALS
TEMPERATURE: 98.78 F | OXYGEN SATURATION: 100 % | RESPIRATION RATE: 22 BRPM | HEART RATE: 97 BPM | SYSTOLIC BLOOD PRESSURE: 143 MMHG | DIASTOLIC BLOOD PRESSURE: 98 MMHG

## 2022-04-29 DIAGNOSIS — I11.0: ICD-10-CM

## 2022-04-29 DIAGNOSIS — I83.022: Primary | ICD-10-CM

## 2022-04-29 DIAGNOSIS — K74.69: ICD-10-CM

## 2022-04-29 DIAGNOSIS — E78.5: ICD-10-CM

## 2022-04-29 DIAGNOSIS — I50.9: ICD-10-CM

## 2022-04-29 DIAGNOSIS — I87.8: ICD-10-CM

## 2022-04-29 DIAGNOSIS — L97.229: ICD-10-CM

## 2022-04-29 DIAGNOSIS — Z87.891: ICD-10-CM

## 2022-04-29 DIAGNOSIS — M86.9: ICD-10-CM

## 2022-04-29 DIAGNOSIS — L03.116: ICD-10-CM

## 2022-04-29 DIAGNOSIS — E03.9: ICD-10-CM

## 2022-04-29 DIAGNOSIS — A49.8: ICD-10-CM

## 2022-04-29 DIAGNOSIS — G89.29: ICD-10-CM

## 2022-04-29 DIAGNOSIS — I73.9: ICD-10-CM

## 2022-04-29 DIAGNOSIS — J44.9: ICD-10-CM

## 2022-04-29 DIAGNOSIS — E66.9: ICD-10-CM

## 2022-04-29 DIAGNOSIS — Z91.19: ICD-10-CM

## 2022-04-29 LAB
ALBUMIN LEVEL: 3 G/DL (ref 3.5–5)
ALBUMIN/GLOB SERPL: 0.8 {RATIO} (ref 1.1–1.8)
ALP ISO SERPL-ACNC: 318 U/L (ref 38–126)
ALT SERPLBLD-CCNC: 69 U/L (ref 12–78)
AMMONIA: 103 UMOL/L (ref 9–30)
ANION GAP SERPL CALC-SCNC: 6.8 MEQ/L (ref 5–15)
AST SERPL QL: 137 U/L (ref 17–59)
BILIRUBIN,TOTAL: 2.3 MG/DL (ref 0.2–1.3)
BUN SERPL-MCNC: 7 MG/DL (ref 9–20)
CALCIUM SPEC-MCNC: 8.1 MG/DL (ref 8.4–10.2)
CHLORIDE SPEC-SCNC: 104 MMOL/L (ref 98–107)
CO2 SERPL-SCNC: 29 MMOL/L (ref 22–30)
CREAT BLD-SCNC: 0.6 MG/DL (ref 0.66–1.25)
CREATININE CLEARANCE ESTIMATED: 151 ML/MIN (ref 50–200)
ESTIMATED GLOMERULAR FILT RATE: 144 ML/MIN (ref 60–?)
GFR (AFRICAN AMERICAN): 174 ML/MIN (ref 60–?)
GLOBULIN SER CALC-MCNC: 3.9 G/DL (ref 1.3–3.2)
GLUCOSE: 94 MG/DL (ref 74–100)
HCT VFR BLD CALC: 37.2 % (ref 42–52)
HGB BLD-MCNC: 12.2 G/DL (ref 14.1–18)
MCHC RBC-ENTMCNC: 32.8 G/DL (ref 31.8–35.4)
MCV RBC: 93.1 FL (ref 80–94)
MEAN CORPUSCULAR HEMOGLOBIN: 30.6 PG (ref 27–31.2)
PLATELET # BLD: 85 K/MM3 (ref 142–424)
POTASSIUM: 3.8 MMOL/L (ref 3.5–5.1)
PROT SERPL-MCNC: 6.9 G/DL (ref 6.3–8.2)
RBC # BLD AUTO: 3.99 M/MM3 (ref 4.6–6.2)
SODIUM SPEC-SCNC: 136 MMOL/L (ref 136–145)
WBC # BLD AUTO: 3.1 K/MM3 (ref 4.8–10.8)

## 2022-04-29 PROCEDURE — U0005 INFEC AGEN DETEC AMPLI PROBE: HCPCS

## 2022-04-29 PROCEDURE — 85025 COMPLETE CBC W/AUTO DIFF WBC: CPT

## 2022-04-29 PROCEDURE — A9576 INJ PROHANCE MULTIPACK: HCPCS

## 2022-04-29 PROCEDURE — 80048 BASIC METABOLIC PNL TOTAL CA: CPT

## 2022-04-29 PROCEDURE — 87040 BLOOD CULTURE FOR BACTERIA: CPT

## 2022-04-29 PROCEDURE — 87205 SMEAR GRAM STAIN: CPT

## 2022-04-29 PROCEDURE — 73590 X-RAY EXAM OF LOWER LEG: CPT

## 2022-04-29 PROCEDURE — C9803 HOPD COVID-19 SPEC COLLECT: HCPCS

## 2022-04-29 PROCEDURE — 73720 MRI LWR EXTREMITY W/O&W/DYE: CPT

## 2022-04-29 PROCEDURE — 87186 SC STD MICRODIL/AGAR DIL: CPT

## 2022-04-29 PROCEDURE — 80053 COMPREHEN METABOLIC PANEL: CPT

## 2022-04-29 PROCEDURE — G0378 HOSPITAL OBSERVATION PER HR: HCPCS

## 2022-04-29 PROCEDURE — 80076 HEPATIC FUNCTION PANEL: CPT

## 2022-04-29 PROCEDURE — 87070 CULTURE OTHR SPECIMN AEROBIC: CPT

## 2022-04-29 PROCEDURE — 36415 COLL VENOUS BLD VENIPUNCTURE: CPT

## 2022-04-29 PROCEDURE — 71045 X-RAY EXAM CHEST 1 VIEW: CPT

## 2022-04-29 PROCEDURE — 87081 CULTURE SCREEN ONLY: CPT

## 2022-04-29 PROCEDURE — 87077 CULTURE AEROBIC IDENTIFY: CPT

## 2022-04-29 PROCEDURE — 82140 ASSAY OF AMMONIA: CPT

## 2022-04-29 PROCEDURE — U0003 INFECTIOUS AGENT DETECTION BY NUCLEIC ACID (DNA OR RNA); SEVERE ACUTE RESPIRATORY SYNDROME CORONAVIRUS 2 (SARS-COV-2) (CORONAVIRUS DISEASE [COVID-19]), AMPLIFIED PROBE TECHNIQUE, MAKING USE OF HIGH THROUGHPUT TECHNOLOGIES AS DESCRIBED BY CMS-2020-01-R: HCPCS

## 2022-04-30 VITALS
OXYGEN SATURATION: 95 % | HEART RATE: 84 BPM | RESPIRATION RATE: 20 BRPM | TEMPERATURE: 98.2 F | SYSTOLIC BLOOD PRESSURE: 124 MMHG | DIASTOLIC BLOOD PRESSURE: 89 MMHG

## 2022-04-30 VITALS
HEART RATE: 103 BPM | TEMPERATURE: 97.88 F | OXYGEN SATURATION: 100 % | DIASTOLIC BLOOD PRESSURE: 54 MMHG | SYSTOLIC BLOOD PRESSURE: 147 MMHG | RESPIRATION RATE: 22 BRPM

## 2022-04-30 VITALS
OXYGEN SATURATION: 95 % | RESPIRATION RATE: 18 BRPM | DIASTOLIC BLOOD PRESSURE: 78 MMHG | TEMPERATURE: 98.2 F | SYSTOLIC BLOOD PRESSURE: 126 MMHG | HEART RATE: 84 BPM

## 2022-04-30 VITALS
TEMPERATURE: 98.4 F | SYSTOLIC BLOOD PRESSURE: 153 MMHG | OXYGEN SATURATION: 96 % | RESPIRATION RATE: 18 BRPM | HEART RATE: 94 BPM | DIASTOLIC BLOOD PRESSURE: 68 MMHG

## 2022-04-30 LAB
ANION GAP SERPL CALC-SCNC: 6.3 MEQ/L (ref 5–15)
BUN SERPL-MCNC: 10 MG/DL (ref 9–20)
CALCIUM SPEC-MCNC: 8.1 MG/DL (ref 8.4–10.2)
CHLORIDE SPEC-SCNC: 101 MMOL/L (ref 98–107)
CO2 SERPL-SCNC: 32 MMOL/L (ref 22–30)
CREAT BLD-SCNC: 0.8 MG/DL (ref 0.66–1.25)
CREATININE CLEARANCE ESTIMATED: 109 ML/MIN (ref 50–200)
ESTIMATED GLOMERULAR FILT RATE: 103 ML/MIN (ref 60–?)
GFR (AFRICAN AMERICAN): 125 ML/MIN (ref 60–?)
GLUCOSE: 166 MG/DL (ref 74–100)
HCT VFR BLD CALC: 38.8 % (ref 42–52)
HGB BLD-MCNC: 13.2 G/DL (ref 14.1–18)
MCHC RBC-ENTMCNC: 33.9 G/DL (ref 31.8–35.4)
MCV RBC: 92.3 FL (ref 80–94)
MEAN CORPUSCULAR HEMOGLOBIN: 31.3 PG (ref 27–31.2)
PLATELET # BLD: 83 K/MM3 (ref 142–424)
POTASSIUM: 3.3 MMOL/L (ref 3.5–5.1)
RBC # BLD AUTO: 4.2 M/MM3 (ref 4.6–6.2)
SODIUM SPEC-SCNC: 136 MMOL/L (ref 136–145)
WBC # BLD AUTO: 3.8 K/MM3 (ref 4.8–10.8)

## 2022-05-01 VITALS
TEMPERATURE: 98.06 F | RESPIRATION RATE: 20 BRPM | OXYGEN SATURATION: 98 % | HEART RATE: 78 BPM | DIASTOLIC BLOOD PRESSURE: 59 MMHG | SYSTOLIC BLOOD PRESSURE: 152 MMHG

## 2022-05-01 VITALS
HEART RATE: 82 BPM | DIASTOLIC BLOOD PRESSURE: 64 MMHG | TEMPERATURE: 97.7 F | OXYGEN SATURATION: 99 % | SYSTOLIC BLOOD PRESSURE: 114 MMHG | RESPIRATION RATE: 16 BRPM

## 2022-05-01 VITALS
TEMPERATURE: 98.3 F | DIASTOLIC BLOOD PRESSURE: 63 MMHG | RESPIRATION RATE: 20 BRPM | OXYGEN SATURATION: 99 % | HEART RATE: 80 BPM | SYSTOLIC BLOOD PRESSURE: 141 MMHG

## 2022-05-01 VITALS
HEART RATE: 68 BPM | OXYGEN SATURATION: 96 % | DIASTOLIC BLOOD PRESSURE: 69 MMHG | SYSTOLIC BLOOD PRESSURE: 122 MMHG | TEMPERATURE: 97.8 F | RESPIRATION RATE: 16 BRPM

## 2022-05-02 VITALS
SYSTOLIC BLOOD PRESSURE: 139 MMHG | TEMPERATURE: 98.06 F | OXYGEN SATURATION: 95 % | RESPIRATION RATE: 18 BRPM | HEART RATE: 76 BPM | DIASTOLIC BLOOD PRESSURE: 65 MMHG

## 2022-05-02 VITALS — OXYGEN SATURATION: 95 %

## 2022-05-02 VITALS
TEMPERATURE: 97.88 F | SYSTOLIC BLOOD PRESSURE: 133 MMHG | OXYGEN SATURATION: 96 % | DIASTOLIC BLOOD PRESSURE: 71 MMHG | HEART RATE: 73 BPM | RESPIRATION RATE: 18 BRPM

## 2022-05-02 VITALS
RESPIRATION RATE: 18 BRPM | TEMPERATURE: 97.9 F | HEART RATE: 69 BPM | OXYGEN SATURATION: 97 % | SYSTOLIC BLOOD PRESSURE: 118 MMHG | DIASTOLIC BLOOD PRESSURE: 71 MMHG

## 2022-05-02 VITALS
HEART RATE: 74 BPM | OXYGEN SATURATION: 96 % | RESPIRATION RATE: 16 BRPM | SYSTOLIC BLOOD PRESSURE: 154 MMHG | TEMPERATURE: 98 F | DIASTOLIC BLOOD PRESSURE: 69 MMHG

## 2022-05-02 LAB
ALBUMIN LEVEL: 2.6 G/DL (ref 3.5–5)
ALP ISO SERPL-ACNC: 233 U/L (ref 38–126)
ALT SERPLBLD-CCNC: 58 U/L (ref 12–78)
AMMONIA: 132 UMOL/L (ref 9–30)
ANION GAP SERPL CALC-SCNC: 5.4 MEQ/L (ref 5–15)
AST SERPL QL: 97 U/L (ref 17–59)
BILIRUB DIRECT SERPL-MCNC: 0.3 MG/DL (ref 0–0.4)
BILIRUB INDIRECT SERPL-MCNC: 1.5 MG/DL (ref 0–0.9)
BILIRUB INDIRECT SERPL-MCNC: 1.5 MG/DL (ref 0–1.1)
BILIRUBIN,TOTAL: 1.8 MG/DL (ref 0.2–1.3)
BUN SERPL-MCNC: 23 MG/DL (ref 9–20)
CALCIUM SPEC-MCNC: 8.6 MG/DL (ref 8.4–10.2)
CHLORIDE SPEC-SCNC: 98 MMOL/L (ref 98–107)
CO2 SERPL-SCNC: 35 MMOL/L (ref 22–30)
CREAT BLD-SCNC: 0.9 MG/DL (ref 0.66–1.25)
CREATININE CLEARANCE ESTIMATED: 97 ML/MIN (ref 50–200)
ESTIMATED GLOMERULAR FILT RATE: 90 ML/MIN (ref 60–?)
GFR (AFRICAN AMERICAN): 109 ML/MIN (ref 60–?)
GLUCOSE: 202 MG/DL (ref 74–100)
HCT VFR BLD CALC: 37 % (ref 42–52)
HGB BLD-MCNC: 12.5 G/DL (ref 14.1–18)
MCHC RBC-ENTMCNC: 33.7 G/DL (ref 31.8–35.4)
MCV RBC: 91.7 FL (ref 80–94)
MEAN CORPUSCULAR HEMOGLOBIN: 30.9 PG (ref 27–31.2)
PLATELET # BLD: 98 K/MM3 (ref 142–424)
POTASSIUM: 3.4 MMOL/L (ref 3.5–5.1)
PROT SERPL-MCNC: 6.4 G/DL (ref 6.3–8.2)
RBC # BLD AUTO: 4.03 M/MM3 (ref 4.6–6.2)
SODIUM SPEC-SCNC: 135 MMOL/L (ref 136–145)
WBC # BLD AUTO: 3.6 K/MM3 (ref 4.8–10.8)

## 2022-05-03 VITALS
TEMPERATURE: 98.4 F | OXYGEN SATURATION: 95 % | HEART RATE: 75 BPM | RESPIRATION RATE: 18 BRPM | DIASTOLIC BLOOD PRESSURE: 48 MMHG | SYSTOLIC BLOOD PRESSURE: 128 MMHG

## 2022-05-03 VITALS
HEART RATE: 77 BPM | DIASTOLIC BLOOD PRESSURE: 96 MMHG | TEMPERATURE: 98.42 F | SYSTOLIC BLOOD PRESSURE: 150 MMHG | OXYGEN SATURATION: 92 % | RESPIRATION RATE: 18 BRPM

## 2022-05-03 LAB
AMMONIA: 109 UMOL/L (ref 9–30)
ANION GAP SERPL CALC-SCNC: 4.7 MEQ/L (ref 5–15)
BUN SERPL-MCNC: 29 MG/DL (ref 9–20)
CALCIUM SPEC-MCNC: 8.3 MG/DL (ref 8.4–10.2)
CHLORIDE SPEC-SCNC: 97 MMOL/L (ref 98–107)
CO2 SERPL-SCNC: 34 MMOL/L (ref 22–30)
CREAT BLD-SCNC: 1.1 MG/DL (ref 0.66–1.25)
CREATININE CLEARANCE ESTIMATED: 79 ML/MIN (ref 50–200)
ESTIMATED GLOMERULAR FILT RATE: 71 ML/MIN (ref 60–?)
GFR (AFRICAN AMERICAN): 86 ML/MIN (ref 60–?)
GLUCOSE: 170 MG/DL (ref 74–100)
HCT VFR BLD CALC: 35 % (ref 42–52)
HGB BLD-MCNC: 11.7 G/DL (ref 14.1–18)
MCHC RBC-ENTMCNC: 33.4 G/DL (ref 31.8–35.4)
MCV RBC: 91.5 FL (ref 80–94)
MEAN CORPUSCULAR HEMOGLOBIN: 30.5 PG (ref 27–31.2)
PLATELET # BLD: 98 K/MM3 (ref 142–424)
POTASSIUM: 3.7 MMOL/L (ref 3.5–5.1)
RBC # BLD AUTO: 3.82 M/MM3 (ref 4.6–6.2)
SODIUM SPEC-SCNC: 132 MMOL/L (ref 136–145)
WBC # BLD AUTO: 3.3 K/MM3 (ref 4.8–10.8)

## 2022-05-10 ENCOUNTER — HOSPITAL ENCOUNTER (OUTPATIENT)
Age: 47
End: 2022-05-10
Payer: MEDICARE

## 2022-05-10 DIAGNOSIS — E11.9: Primary | ICD-10-CM

## 2022-05-10 LAB — HBA1C MFR BLD: 5 % (ref 4–6)

## 2022-05-10 PROCEDURE — 83036 HEMOGLOBIN GLYCOSYLATED A1C: CPT

## 2022-06-17 ENCOUNTER — HOSPITAL ENCOUNTER (INPATIENT)
Dept: HOSPITAL 22 - ER | Age: 47
LOS: 4 days | Discharge: LEFT BEFORE DISCHARGE | DRG: 442 | End: 2022-06-21
Payer: MEDICARE

## 2022-06-17 VITALS
OXYGEN SATURATION: 100 % | TEMPERATURE: 98.6 F | HEART RATE: 70 BPM | DIASTOLIC BLOOD PRESSURE: 90 MMHG | RESPIRATION RATE: 22 BRPM | SYSTOLIC BLOOD PRESSURE: 167 MMHG

## 2022-06-17 VITALS
DIASTOLIC BLOOD PRESSURE: 100 MMHG | SYSTOLIC BLOOD PRESSURE: 167 MMHG | TEMPERATURE: 98.4 F | OXYGEN SATURATION: 99 % | HEART RATE: 90 BPM | RESPIRATION RATE: 22 BRPM

## 2022-06-17 VITALS
SYSTOLIC BLOOD PRESSURE: 205 MMHG | HEART RATE: 81 BPM | OXYGEN SATURATION: 100 % | RESPIRATION RATE: 18 BRPM | DIASTOLIC BLOOD PRESSURE: 129 MMHG

## 2022-06-17 VITALS — SYSTOLIC BLOOD PRESSURE: 179 MMHG | DIASTOLIC BLOOD PRESSURE: 106 MMHG | HEART RATE: 79 BPM | OXYGEN SATURATION: 100 %

## 2022-06-17 VITALS — DIASTOLIC BLOOD PRESSURE: 94 MMHG | OXYGEN SATURATION: 100 % | SYSTOLIC BLOOD PRESSURE: 156 MMHG | HEART RATE: 75 BPM

## 2022-06-17 VITALS — RESPIRATION RATE: 18 BRPM

## 2022-06-17 VITALS
HEART RATE: 80 BPM | OXYGEN SATURATION: 100 % | DIASTOLIC BLOOD PRESSURE: 86 MMHG | TEMPERATURE: 98 F | SYSTOLIC BLOOD PRESSURE: 174 MMHG | RESPIRATION RATE: 18 BRPM

## 2022-06-17 VITALS
BODY MASS INDEX: 44.5 KG/M2 | BODY MASS INDEX: 40.1 KG/M2 | BODY MASS INDEX: 41.3 KG/M2 | BODY MASS INDEX: 41.2 KG/M2 | BODY MASS INDEX: 41.1 KG/M2 | BODY MASS INDEX: 40.2 KG/M2

## 2022-06-17 VITALS
OXYGEN SATURATION: 100 % | HEART RATE: 58 BPM | SYSTOLIC BLOOD PRESSURE: 143 MMHG | TEMPERATURE: 98.6 F | DIASTOLIC BLOOD PRESSURE: 89 MMHG | RESPIRATION RATE: 20 BRPM

## 2022-06-17 VITALS — DIASTOLIC BLOOD PRESSURE: 96 MMHG | SYSTOLIC BLOOD PRESSURE: 172 MMHG | HEART RATE: 89 BPM | OXYGEN SATURATION: 100 %

## 2022-06-17 VITALS
DIASTOLIC BLOOD PRESSURE: 121 MMHG | OXYGEN SATURATION: 99 % | HEART RATE: 82 BPM | SYSTOLIC BLOOD PRESSURE: 189 MMHG | RESPIRATION RATE: 20 BRPM

## 2022-06-17 VITALS — DIASTOLIC BLOOD PRESSURE: 94 MMHG | HEART RATE: 78 BPM | SYSTOLIC BLOOD PRESSURE: 156 MMHG | OXYGEN SATURATION: 100 %

## 2022-06-17 DIAGNOSIS — I11.0: ICD-10-CM

## 2022-06-17 DIAGNOSIS — I73.9: ICD-10-CM

## 2022-06-17 DIAGNOSIS — R77.8: ICD-10-CM

## 2022-06-17 DIAGNOSIS — R19.7: ICD-10-CM

## 2022-06-17 DIAGNOSIS — E87.6: ICD-10-CM

## 2022-06-17 DIAGNOSIS — J44.9: ICD-10-CM

## 2022-06-17 DIAGNOSIS — D61.818: ICD-10-CM

## 2022-06-17 DIAGNOSIS — I87.2: ICD-10-CM

## 2022-06-17 DIAGNOSIS — E78.5: ICD-10-CM

## 2022-06-17 DIAGNOSIS — K74.60: ICD-10-CM

## 2022-06-17 DIAGNOSIS — Z87.891: ICD-10-CM

## 2022-06-17 DIAGNOSIS — E03.9: ICD-10-CM

## 2022-06-17 DIAGNOSIS — K72.90: Primary | ICD-10-CM

## 2022-06-17 DIAGNOSIS — L97.922: ICD-10-CM

## 2022-06-17 DIAGNOSIS — I50.9: ICD-10-CM

## 2022-06-17 LAB
ALBUMIN LEVEL: 3 G/DL (ref 3.5–5)
ALBUMIN/GLOB SERPL: 0.8 {RATIO} (ref 1.1–1.8)
ALP ISO SERPL-ACNC: 266 U/L (ref 38–126)
ALT SERPLBLD-CCNC: 61 U/L (ref 12–78)
AMMONIA: 149 UMOL/L (ref 9–30)
ANION GAP SERPL CALC-SCNC: 3.1 MEQ/L (ref 5–15)
AST SERPL QL: 90 U/L (ref 17–59)
BILIRUBIN,TOTAL: 1.9 MG/DL (ref 0.2–1.3)
BUN SERPL-MCNC: 8 MG/DL (ref 9–20)
CALCIUM SPEC-MCNC: 8.5 MG/DL (ref 8.4–10.2)
CAOX CRY URNS QL MICRO: (no result) /LPF
CHLORIDE SPEC-SCNC: 105 MMOL/L (ref 98–107)
CO2 SERPL-SCNC: 32 MMOL/L (ref 22–30)
COLOR UR: YELLOW
CREAT BLD-SCNC: 0.6 MG/DL (ref 0.66–1.25)
CREATININE CLEARANCE ESTIMATED: 146 ML/MIN (ref 50–200)
ESTIMATED GLOMERULAR FILT RATE: 144 ML/MIN (ref 60–?)
GFR (AFRICAN AMERICAN): 174 ML/MIN (ref 60–?)
GLOBULIN SER CALC-MCNC: 4 G/DL (ref 1.3–3.2)
GLUCOSE: 118 MG/DL (ref 74–100)
HCT VFR BLD CALC: 39.6 % (ref 42–52)
HGB BLD-MCNC: 13 G/DL (ref 14.1–18)
LIPASE: 41 U/L (ref 23–300)
MCHC RBC-ENTMCNC: 32.8 G/DL (ref 31.8–35.4)
MCV RBC: 93.7 FL (ref 80–94)
MEAN CORPUSCULAR HEMOGLOBIN: 30.8 PG (ref 27–31.2)
MICRO URNS: (no result)
PH UR: 6.5 [PH] (ref 5–8.5)
PLATELET # BLD: 100 K/MM3 (ref 142–424)
POTASSIUM: 3.1 MMOL/L (ref 3.5–5.1)
PROT SERPL-MCNC: 7 G/DL (ref 6.3–8.2)
RBC # BLD AUTO: 4.23 M/MM3 (ref 4.6–6.2)
SODIUM SPEC-SCNC: 137 MMOL/L (ref 136–145)
SP GR UR: 1.01 (ref 1–1.03)
TROPONIN I: 0.03 NG/ML (ref 0–0.03)
TROPONIN I: 0.04 NG/ML (ref 0–0.03)
TROPONIN I: 0.05 NG/ML (ref 0–0.03)
UROBILINOGEN UR QL: 4 EU/DL
WBC # BLD AUTO: 4.2 K/MM3 (ref 4.8–10.8)

## 2022-06-17 PROCEDURE — 87506 IADNA-DNA/RNA PROBE TQ 6-11: CPT

## 2022-06-17 PROCEDURE — U0005 INFEC AGEN DETEC AMPLI PROBE: HCPCS

## 2022-06-17 PROCEDURE — C9803 HOPD COVID-19 SPEC COLLECT: HCPCS

## 2022-06-17 PROCEDURE — 82140 ASSAY OF AMMONIA: CPT

## 2022-06-17 PROCEDURE — 87205 SMEAR GRAM STAIN: CPT

## 2022-06-17 PROCEDURE — 87186 SC STD MICRODIL/AGAR DIL: CPT

## 2022-06-17 PROCEDURE — 80048 BASIC METABOLIC PNL TOTAL CA: CPT

## 2022-06-17 PROCEDURE — 80053 COMPREHEN METABOLIC PANEL: CPT

## 2022-06-17 PROCEDURE — 99285 EMERGENCY DEPT VISIT HI MDM: CPT

## 2022-06-17 PROCEDURE — 71046 X-RAY EXAM CHEST 2 VIEWS: CPT

## 2022-06-17 PROCEDURE — 87070 CULTURE OTHR SPECIMN AEROBIC: CPT

## 2022-06-17 PROCEDURE — 74176 CT ABD & PELVIS W/O CONTRAST: CPT

## 2022-06-17 PROCEDURE — 85025 COMPLETE CBC W/AUTO DIFF WBC: CPT

## 2022-06-17 PROCEDURE — 87081 CULTURE SCREEN ONLY: CPT

## 2022-06-17 PROCEDURE — 93306 TTE W/DOPPLER COMPLETE: CPT

## 2022-06-17 PROCEDURE — 36415 COLL VENOUS BLD VENIPUNCTURE: CPT

## 2022-06-17 PROCEDURE — 71045 X-RAY EXAM CHEST 1 VIEW: CPT

## 2022-06-17 PROCEDURE — 87040 BLOOD CULTURE FOR BACTERIA: CPT

## 2022-06-17 PROCEDURE — U0003 INFECTIOUS AGENT DETECTION BY NUCLEIC ACID (DNA OR RNA); SEVERE ACUTE RESPIRATORY SYNDROME CORONAVIRUS 2 (SARS-COV-2) (CORONAVIRUS DISEASE [COVID-19]), AMPLIFIED PROBE TECHNIQUE, MAKING USE OF HIGH THROUGHPUT TECHNOLOGIES AS DESCRIBED BY CMS-2020-01-R: HCPCS

## 2022-06-17 PROCEDURE — 93005 ELECTROCARDIOGRAM TRACING: CPT

## 2022-06-17 PROCEDURE — 84484 ASSAY OF TROPONIN QUANT: CPT

## 2022-06-17 PROCEDURE — 87077 CULTURE AEROBIC IDENTIFY: CPT

## 2022-06-17 PROCEDURE — 83605 ASSAY OF LACTIC ACID: CPT

## 2022-06-17 PROCEDURE — 81001 URINALYSIS AUTO W/SCOPE: CPT

## 2022-06-17 PROCEDURE — 83690 ASSAY OF LIPASE: CPT

## 2022-06-18 VITALS
TEMPERATURE: 98 F | OXYGEN SATURATION: 97 % | HEART RATE: 95 BPM | SYSTOLIC BLOOD PRESSURE: 134 MMHG | RESPIRATION RATE: 17 BRPM | DIASTOLIC BLOOD PRESSURE: 81 MMHG

## 2022-06-18 VITALS
TEMPERATURE: 98.1 F | OXYGEN SATURATION: 99 % | RESPIRATION RATE: 17 BRPM | HEART RATE: 95 BPM | DIASTOLIC BLOOD PRESSURE: 90 MMHG | SYSTOLIC BLOOD PRESSURE: 156 MMHG

## 2022-06-18 VITALS
RESPIRATION RATE: 18 BRPM | DIASTOLIC BLOOD PRESSURE: 90 MMHG | SYSTOLIC BLOOD PRESSURE: 165 MMHG | OXYGEN SATURATION: 98 % | HEART RATE: 87 BPM | TEMPERATURE: 98.8 F

## 2022-06-18 VITALS
RESPIRATION RATE: 18 BRPM | SYSTOLIC BLOOD PRESSURE: 148 MMHG | DIASTOLIC BLOOD PRESSURE: 97 MMHG | OXYGEN SATURATION: 98 % | HEART RATE: 90 BPM | TEMPERATURE: 98.1 F

## 2022-06-18 VITALS — HEART RATE: 81 BPM

## 2022-06-18 VITALS
SYSTOLIC BLOOD PRESSURE: 146 MMHG | TEMPERATURE: 98.42 F | RESPIRATION RATE: 18 BRPM | HEART RATE: 102 BPM | DIASTOLIC BLOOD PRESSURE: 81 MMHG | OXYGEN SATURATION: 91 %

## 2022-06-18 VITALS
RESPIRATION RATE: 18 BRPM | HEART RATE: 86 BPM | SYSTOLIC BLOOD PRESSURE: 155 MMHG | OXYGEN SATURATION: 97 % | DIASTOLIC BLOOD PRESSURE: 78 MMHG | TEMPERATURE: 97.34 F

## 2022-06-18 VITALS — HEART RATE: 104 BPM

## 2022-06-18 LAB
ALBUMIN LEVEL: 2.8 G/DL (ref 3.5–5)
ALBUMIN/GLOB SERPL: 0.7 {RATIO} (ref 1.1–1.8)
ALP ISO SERPL-ACNC: 210 U/L (ref 38–126)
ALT SERPLBLD-CCNC: 59 U/L (ref 12–78)
AMMONIA: 94 UMOL/L (ref 9–30)
ANION GAP SERPL CALC-SCNC: 5 MEQ/L (ref 5–15)
AST SERPL QL: 88 U/L (ref 17–59)
BILIRUBIN,TOTAL: 1.8 MG/DL (ref 0.2–1.3)
BUN SERPL-MCNC: 8 MG/DL (ref 9–20)
CALCIUM SPEC-MCNC: 7.9 MG/DL (ref 8.4–10.2)
CHLORIDE SPEC-SCNC: 105 MMOL/L (ref 98–107)
CO2 SERPL-SCNC: 31 MMOL/L (ref 22–30)
CREAT BLD-SCNC: 0.7 MG/DL (ref 0.66–1.25)
CREATININE CLEARANCE ESTIMATED: 125 ML/MIN (ref 50–200)
ESTIMATED GLOMERULAR FILT RATE: 120 ML/MIN (ref 60–?)
GFR (AFRICAN AMERICAN): 146 ML/MIN (ref 60–?)
GLOBULIN SER CALC-MCNC: 3.9 G/DL (ref 1.3–3.2)
GLUCOSE: 138 MG/DL (ref 74–100)
HCT VFR BLD CALC: 38.9 % (ref 42–52)
HGB BLD-MCNC: 12.6 G/DL (ref 14.1–18)
MCHC RBC-ENTMCNC: 32.4 G/DL (ref 31.8–35.4)
MCV RBC: 94.6 FL (ref 80–94)
MEAN CORPUSCULAR HEMOGLOBIN: 30.6 PG (ref 27–31.2)
PLATELET # BLD: 95 K/MM3 (ref 142–424)
POTASSIUM: 3 MMOL/L (ref 3.5–5.1)
PROT SERPL-MCNC: 6.7 G/DL (ref 6.3–8.2)
RBC # BLD AUTO: 4.11 M/MM3 (ref 4.6–6.2)
SODIUM SPEC-SCNC: 138 MMOL/L (ref 136–145)
WBC # BLD AUTO: 4.2 K/MM3 (ref 4.8–10.8)

## 2022-06-19 VITALS
HEART RATE: 80 BPM | SYSTOLIC BLOOD PRESSURE: 144 MMHG | TEMPERATURE: 98.8 F | DIASTOLIC BLOOD PRESSURE: 78 MMHG | RESPIRATION RATE: 16 BRPM | OXYGEN SATURATION: 97 %

## 2022-06-19 VITALS
TEMPERATURE: 98.3 F | DIASTOLIC BLOOD PRESSURE: 78 MMHG | RESPIRATION RATE: 17 BRPM | OXYGEN SATURATION: 98 % | SYSTOLIC BLOOD PRESSURE: 149 MMHG | HEART RATE: 80 BPM

## 2022-06-19 VITALS
SYSTOLIC BLOOD PRESSURE: 150 MMHG | TEMPERATURE: 97.9 F | DIASTOLIC BLOOD PRESSURE: 81 MMHG | RESPIRATION RATE: 18 BRPM | HEART RATE: 76 BPM | OXYGEN SATURATION: 99 %

## 2022-06-19 VITALS — HEART RATE: 82 BPM

## 2022-06-19 VITALS — RESPIRATION RATE: 16 BRPM | HEART RATE: 97 BPM

## 2022-06-19 VITALS
TEMPERATURE: 98.24 F | RESPIRATION RATE: 17 BRPM | SYSTOLIC BLOOD PRESSURE: 147 MMHG | DIASTOLIC BLOOD PRESSURE: 93 MMHG | OXYGEN SATURATION: 96 % | HEART RATE: 97 BPM

## 2022-06-19 VITALS
HEART RATE: 80 BPM | DIASTOLIC BLOOD PRESSURE: 84 MMHG | TEMPERATURE: 98.4 F | RESPIRATION RATE: 18 BRPM | OXYGEN SATURATION: 99 % | SYSTOLIC BLOOD PRESSURE: 151 MMHG

## 2022-06-19 VITALS
SYSTOLIC BLOOD PRESSURE: 140 MMHG | DIASTOLIC BLOOD PRESSURE: 98 MMHG | HEART RATE: 85 BPM | RESPIRATION RATE: 17 BRPM | OXYGEN SATURATION: 100 % | TEMPERATURE: 97.88 F

## 2022-06-19 VITALS
OXYGEN SATURATION: 99 % | SYSTOLIC BLOOD PRESSURE: 154 MMHG | DIASTOLIC BLOOD PRESSURE: 91 MMHG | RESPIRATION RATE: 18 BRPM | HEART RATE: 83 BPM | TEMPERATURE: 98.78 F

## 2022-06-19 VITALS — HEART RATE: 80 BPM

## 2022-06-19 LAB
ALBUMIN LEVEL: 3.1 G/DL (ref 3.5–5)
ALBUMIN/GLOB SERPL: 0.7 {RATIO} (ref 1.1–1.8)
ALP ISO SERPL-ACNC: 209 U/L (ref 38–126)
ALT SERPLBLD-CCNC: 58 U/L (ref 12–78)
AMMONIA: 98 UMOL/L (ref 9–30)
ANION GAP SERPL CALC-SCNC: 7.2 MEQ/L (ref 5–15)
AST SERPL QL: 83 U/L (ref 17–59)
BILIRUBIN,TOTAL: 2.1 MG/DL (ref 0.2–1.3)
BUN SERPL-MCNC: 14 MG/DL (ref 9–20)
CALCIUM SPEC-MCNC: 8.8 MG/DL (ref 8.4–10.2)
CHLORIDE SPEC-SCNC: 96 MMOL/L (ref 98–107)
CO2 SERPL-SCNC: 37 MMOL/L (ref 22–30)
CREAT BLD-SCNC: 1.1 MG/DL (ref 0.66–1.25)
CREATININE CLEARANCE ESTIMATED: 79 ML/MIN (ref 50–200)
ESTIMATED GLOMERULAR FILT RATE: 71 ML/MIN (ref 60–?)
GFR (AFRICAN AMERICAN): 86 ML/MIN (ref 60–?)
GLOBULIN SER CALC-MCNC: 4.3 G/DL (ref 1.3–3.2)
GLUCOSE: 160 MG/DL (ref 74–100)
HCT VFR BLD CALC: 42.9 % (ref 42–52)
HGB BLD-MCNC: 14 G/DL (ref 14.1–18)
MCHC RBC-ENTMCNC: 32.7 G/DL (ref 31.8–35.4)
MCV RBC: 92.5 FL (ref 80–94)
MEAN CORPUSCULAR HEMOGLOBIN: 30.2 PG (ref 27–31.2)
PLATELET # BLD: 133 K/MM3 (ref 142–424)
POTASSIUM: 3.2 MMOL/L (ref 3.5–5.1)
PROT SERPL-MCNC: 7.4 G/DL (ref 6.3–8.2)
RBC # BLD AUTO: 4.64 M/MM3 (ref 4.6–6.2)
SODIUM SPEC-SCNC: 137 MMOL/L (ref 136–145)
WBC # BLD AUTO: 5.5 K/MM3 (ref 4.8–10.8)

## 2022-06-20 VITALS
TEMPERATURE: 98.4 F | SYSTOLIC BLOOD PRESSURE: 157 MMHG | HEART RATE: 90 BPM | DIASTOLIC BLOOD PRESSURE: 92 MMHG | OXYGEN SATURATION: 98 % | RESPIRATION RATE: 19 BRPM

## 2022-06-20 VITALS
SYSTOLIC BLOOD PRESSURE: 135 MMHG | TEMPERATURE: 98.3 F | OXYGEN SATURATION: 96 % | HEART RATE: 56 BPM | RESPIRATION RATE: 16 BRPM | DIASTOLIC BLOOD PRESSURE: 69 MMHG

## 2022-06-20 VITALS
RESPIRATION RATE: 16 BRPM | SYSTOLIC BLOOD PRESSURE: 162 MMHG | HEART RATE: 80 BPM | DIASTOLIC BLOOD PRESSURE: 70 MMHG | OXYGEN SATURATION: 99 % | TEMPERATURE: 99 F

## 2022-06-20 VITALS
RESPIRATION RATE: 16 BRPM | OXYGEN SATURATION: 95 % | TEMPERATURE: 97.8 F | SYSTOLIC BLOOD PRESSURE: 140 MMHG | DIASTOLIC BLOOD PRESSURE: 76 MMHG | HEART RATE: 83 BPM

## 2022-06-20 VITALS
TEMPERATURE: 98.78 F | HEART RATE: 76 BPM | SYSTOLIC BLOOD PRESSURE: 141 MMHG | RESPIRATION RATE: 17 BRPM | OXYGEN SATURATION: 95 % | DIASTOLIC BLOOD PRESSURE: 69 MMHG

## 2022-06-20 VITALS — HEART RATE: 90 BPM

## 2022-06-20 VITALS — HEART RATE: 80 BPM

## 2022-06-20 LAB
ALBUMIN LEVEL: 2.9 G/DL (ref 3.5–5)
ALBUMIN/GLOB SERPL: 0.7 {RATIO} (ref 1.1–1.8)
ALP ISO SERPL-ACNC: 201 U/L (ref 38–126)
ALT SERPLBLD-CCNC: 50 U/L (ref 12–78)
AMMONIA: 122 UMOL/L (ref 9–30)
ANION GAP SERPL CALC-SCNC: 5.7 MEQ/L (ref 5–15)
AST SERPL QL: 83 U/L (ref 17–59)
BILIRUBIN,TOTAL: 2.1 MG/DL (ref 0.2–1.3)
BUN SERPL-MCNC: 18 MG/DL (ref 9–20)
CALCIUM SPEC-MCNC: 8.3 MG/DL (ref 8.4–10.2)
CHLORIDE SPEC-SCNC: 96 MMOL/L (ref 98–107)
CO2 SERPL-SCNC: 35 MMOL/L (ref 22–30)
CREAT BLD-SCNC: 0.8 MG/DL (ref 0.66–1.25)
CREATININE CLEARANCE ESTIMATED: 109 ML/MIN (ref 50–200)
ESTIMATED GLOMERULAR FILT RATE: 103 ML/MIN (ref 60–?)
GFR (AFRICAN AMERICAN): 125 ML/MIN (ref 60–?)
GLOBULIN SER CALC-MCNC: 4.3 G/DL (ref 1.3–3.2)
GLUCOSE: 105 MG/DL (ref 74–100)
HCT VFR BLD CALC: 40.7 % (ref 42–52)
HGB BLD-MCNC: 13.6 G/DL (ref 14.1–18)
MCHC RBC-ENTMCNC: 33.4 G/DL (ref 31.8–35.4)
MCV RBC: 89.8 FL (ref 80–94)
MEAN CORPUSCULAR HEMOGLOBIN: 30 PG (ref 27–31.2)
PLATELET # BLD: 136 K/MM3 (ref 142–424)
POTASSIUM: 2.7 MMOL/L (ref 3.5–5.1)
PROT SERPL-MCNC: 7.2 G/DL (ref 6.3–8.2)
RBC # BLD AUTO: 4.53 M/MM3 (ref 4.6–6.2)
SODIUM SPEC-SCNC: 134 MMOL/L (ref 136–145)
WBC # BLD AUTO: 6 K/MM3 (ref 4.8–10.8)

## 2022-06-21 VITALS
DIASTOLIC BLOOD PRESSURE: 71 MMHG | TEMPERATURE: 98.6 F | HEART RATE: 90 BPM | OXYGEN SATURATION: 96 % | RESPIRATION RATE: 14 BRPM | SYSTOLIC BLOOD PRESSURE: 135 MMHG

## 2022-06-21 VITALS
SYSTOLIC BLOOD PRESSURE: 151 MMHG | RESPIRATION RATE: 17 BRPM | DIASTOLIC BLOOD PRESSURE: 64 MMHG | TEMPERATURE: 99 F | OXYGEN SATURATION: 96 % | HEART RATE: 80 BPM

## 2022-06-21 VITALS
DIASTOLIC BLOOD PRESSURE: 74 MMHG | OXYGEN SATURATION: 96 % | HEART RATE: 81 BPM | TEMPERATURE: 98.24 F | SYSTOLIC BLOOD PRESSURE: 137 MMHG | RESPIRATION RATE: 15 BRPM

## 2022-06-21 VITALS
DIASTOLIC BLOOD PRESSURE: 70 MMHG | TEMPERATURE: 99.1 F | HEART RATE: 81 BPM | OXYGEN SATURATION: 97 % | SYSTOLIC BLOOD PRESSURE: 134 MMHG | RESPIRATION RATE: 17 BRPM

## 2022-06-21 VITALS
TEMPERATURE: 98.6 F | OXYGEN SATURATION: 98 % | HEART RATE: 77 BPM | DIASTOLIC BLOOD PRESSURE: 64 MMHG | SYSTOLIC BLOOD PRESSURE: 152 MMHG | RESPIRATION RATE: 14 BRPM

## 2022-06-21 VITALS — HEART RATE: 73 BPM

## 2022-06-21 LAB
AMMONIA: 59 UMOL/L (ref 9–30)
ANION GAP SERPL CALC-SCNC: 6.3 MEQ/L (ref 5–15)
BUN SERPL-MCNC: 28 MG/DL (ref 9–20)
CALCIUM SPEC-MCNC: 8.4 MG/DL (ref 8.4–10.2)
CHLORIDE SPEC-SCNC: 96 MMOL/L (ref 98–107)
CO2 SERPL-SCNC: 34 MMOL/L (ref 22–30)
CREAT BLD-SCNC: 1.1 MG/DL (ref 0.66–1.25)
CREATININE CLEARANCE ESTIMATED: 79 ML/MIN (ref 50–200)
ESTIMATED GLOMERULAR FILT RATE: 71 ML/MIN (ref 60–?)
GFR (AFRICAN AMERICAN): 86 ML/MIN (ref 60–?)
GLUCOSE: 143 MG/DL (ref 74–100)
HCT VFR BLD CALC: 39.8 % (ref 42–52)
HGB BLD-MCNC: 13.2 G/DL (ref 14.1–18)
MCHC RBC-ENTMCNC: 33.1 G/DL (ref 31.8–35.4)
MCV RBC: 92.5 FL (ref 80–94)
MEAN CORPUSCULAR HEMOGLOBIN: 30.7 PG (ref 27–31.2)
PLATELET # BLD: 129 K/MM3 (ref 142–424)
POTASSIUM: 3.3 MMOL/L (ref 3.5–5.1)
RBC # BLD AUTO: 4.31 M/MM3 (ref 4.6–6.2)
SODIUM SPEC-SCNC: 133 MMOL/L (ref 136–145)
WBC # BLD AUTO: 6 K/MM3 (ref 4.8–10.8)

## 2022-07-01 ENCOUNTER — HOSPITAL ENCOUNTER (EMERGENCY)
Age: 47
Discharge: HOME | End: 2022-07-01
Payer: MEDICARE

## 2022-07-01 VITALS
TEMPERATURE: 98.2 F | BODY MASS INDEX: 36.9 KG/M2 | OXYGEN SATURATION: 97 % | DIASTOLIC BLOOD PRESSURE: 119 MMHG | RESPIRATION RATE: 20 BRPM | SYSTOLIC BLOOD PRESSURE: 182 MMHG | HEART RATE: 101 BPM

## 2022-07-01 VITALS — OXYGEN SATURATION: 96 % | DIASTOLIC BLOOD PRESSURE: 113 MMHG | HEART RATE: 95 BPM | SYSTOLIC BLOOD PRESSURE: 176 MMHG

## 2022-07-01 VITALS
SYSTOLIC BLOOD PRESSURE: 152 MMHG | RESPIRATION RATE: 18 BRPM | DIASTOLIC BLOOD PRESSURE: 74 MMHG | TEMPERATURE: 97.88 F | HEART RATE: 78 BPM | OXYGEN SATURATION: 98 %

## 2022-07-01 DIAGNOSIS — I10: ICD-10-CM

## 2022-07-01 DIAGNOSIS — G89.4: ICD-10-CM

## 2022-07-01 DIAGNOSIS — J44.9: ICD-10-CM

## 2022-07-01 DIAGNOSIS — M79.662: Primary | ICD-10-CM

## 2022-07-01 DIAGNOSIS — R56.9: ICD-10-CM

## 2022-07-01 DIAGNOSIS — L97.829: ICD-10-CM

## 2022-07-01 PROCEDURE — 99283 EMERGENCY DEPT VISIT LOW MDM: CPT

## 2022-09-09 ENCOUNTER — HOSPITAL ENCOUNTER (EMERGENCY)
Age: 47
Discharge: HOME | End: 2022-09-09
Payer: MEDICARE

## 2022-09-09 VITALS
OXYGEN SATURATION: 99 % | HEART RATE: 98 BPM | SYSTOLIC BLOOD PRESSURE: 156 MMHG | DIASTOLIC BLOOD PRESSURE: 99 MMHG | TEMPERATURE: 99.2 F | RESPIRATION RATE: 18 BRPM

## 2022-09-09 VITALS
RESPIRATION RATE: 18 BRPM | HEART RATE: 98 BPM | TEMPERATURE: 99.14 F | DIASTOLIC BLOOD PRESSURE: 99 MMHG | SYSTOLIC BLOOD PRESSURE: 156 MMHG

## 2022-09-09 VITALS — BODY MASS INDEX: 41.5 KG/M2

## 2022-09-09 DIAGNOSIS — Z88.6: ICD-10-CM

## 2022-09-09 DIAGNOSIS — I25.10: ICD-10-CM

## 2022-09-09 DIAGNOSIS — J44.9: ICD-10-CM

## 2022-09-09 DIAGNOSIS — Z79.1: ICD-10-CM

## 2022-09-09 DIAGNOSIS — E03.9: ICD-10-CM

## 2022-09-09 DIAGNOSIS — Z88.8: ICD-10-CM

## 2022-09-09 DIAGNOSIS — R51.9: ICD-10-CM

## 2022-09-09 DIAGNOSIS — Z87.891: ICD-10-CM

## 2022-09-09 DIAGNOSIS — Z79.82: ICD-10-CM

## 2022-09-09 DIAGNOSIS — J02.9: ICD-10-CM

## 2022-09-09 DIAGNOSIS — G89.29: ICD-10-CM

## 2022-09-09 DIAGNOSIS — I10: ICD-10-CM

## 2022-09-09 DIAGNOSIS — Z79.51: ICD-10-CM

## 2022-09-09 DIAGNOSIS — Z88.5: ICD-10-CM

## 2022-09-09 DIAGNOSIS — R50.9: ICD-10-CM

## 2022-09-09 DIAGNOSIS — M79.10: ICD-10-CM

## 2022-09-09 DIAGNOSIS — Z79.899: ICD-10-CM

## 2022-09-09 DIAGNOSIS — U07.1: Primary | ICD-10-CM

## 2022-09-09 PROCEDURE — G0463 HOSPITAL OUTPT CLINIC VISIT: HCPCS

## 2022-09-09 PROCEDURE — 99213 OFFICE O/P EST LOW 20 MIN: CPT

## 2022-09-13 ENCOUNTER — HOSPITAL ENCOUNTER (OUTPATIENT)
Age: 47
End: 2022-09-13
Payer: MEDICARE

## 2022-09-13 DIAGNOSIS — S81.802A: Primary | ICD-10-CM

## 2022-09-13 DIAGNOSIS — B95.7: ICD-10-CM

## 2022-09-13 DIAGNOSIS — B95.2: ICD-10-CM

## 2022-09-13 DIAGNOSIS — B96.4: ICD-10-CM

## 2022-09-13 PROCEDURE — 87186 SC STD MICRODIL/AGAR DIL: CPT

## 2022-09-13 PROCEDURE — 87077 CULTURE AEROBIC IDENTIFY: CPT

## 2022-09-13 PROCEDURE — 87205 SMEAR GRAM STAIN: CPT

## 2022-09-13 PROCEDURE — 87070 CULTURE OTHR SPECIMN AEROBIC: CPT

## 2022-09-18 ENCOUNTER — HOSPITAL ENCOUNTER (EMERGENCY)
Age: 47
Discharge: HOME | End: 2022-09-18
Payer: MEDICARE

## 2022-09-18 VITALS
OXYGEN SATURATION: 98 % | DIASTOLIC BLOOD PRESSURE: 87 MMHG | SYSTOLIC BLOOD PRESSURE: 152 MMHG | HEART RATE: 68 BPM | TEMPERATURE: 98 F | RESPIRATION RATE: 16 BRPM

## 2022-09-18 VITALS
OXYGEN SATURATION: 93 % | RESPIRATION RATE: 22 BRPM | DIASTOLIC BLOOD PRESSURE: 118 MMHG | SYSTOLIC BLOOD PRESSURE: 148 MMHG

## 2022-09-18 VITALS
SYSTOLIC BLOOD PRESSURE: 161 MMHG | OXYGEN SATURATION: 96 % | RESPIRATION RATE: 18 BRPM | HEART RATE: 106 BPM | DIASTOLIC BLOOD PRESSURE: 116 MMHG

## 2022-09-18 VITALS
HEART RATE: 110 BPM | DIASTOLIC BLOOD PRESSURE: 100 MMHG | TEMPERATURE: 98.96 F | OXYGEN SATURATION: 98 % | RESPIRATION RATE: 24 BRPM | SYSTOLIC BLOOD PRESSURE: 132 MMHG

## 2022-09-18 VITALS
RESPIRATION RATE: 22 BRPM | DIASTOLIC BLOOD PRESSURE: 103 MMHG | OXYGEN SATURATION: 96 % | SYSTOLIC BLOOD PRESSURE: 132 MMHG | HEART RATE: 106 BPM

## 2022-09-18 VITALS
HEART RATE: 99 BPM | SYSTOLIC BLOOD PRESSURE: 157 MMHG | RESPIRATION RATE: 20 BRPM | DIASTOLIC BLOOD PRESSURE: 100 MMHG | OXYGEN SATURATION: 96 %

## 2022-09-18 VITALS
OXYGEN SATURATION: 96 % | SYSTOLIC BLOOD PRESSURE: 118 MMHG | DIASTOLIC BLOOD PRESSURE: 94 MMHG | RESPIRATION RATE: 24 BRPM

## 2022-09-18 VITALS — RESPIRATION RATE: 22 BRPM | DIASTOLIC BLOOD PRESSURE: 109 MMHG | SYSTOLIC BLOOD PRESSURE: 158 MMHG

## 2022-09-18 VITALS
DIASTOLIC BLOOD PRESSURE: 115 MMHG | SYSTOLIC BLOOD PRESSURE: 157 MMHG | OXYGEN SATURATION: 95 % | RESPIRATION RATE: 20 BRPM

## 2022-09-18 VITALS
DIASTOLIC BLOOD PRESSURE: 56 MMHG | RESPIRATION RATE: 20 BRPM | OXYGEN SATURATION: 97 % | SYSTOLIC BLOOD PRESSURE: 92 MMHG | HEART RATE: 98 BPM

## 2022-09-18 VITALS — BODY MASS INDEX: 39.9 KG/M2

## 2022-09-18 DIAGNOSIS — F14.11: ICD-10-CM

## 2022-09-18 DIAGNOSIS — L97.222: Primary | ICD-10-CM

## 2022-09-18 DIAGNOSIS — Z88.8: ICD-10-CM

## 2022-09-18 DIAGNOSIS — I10: ICD-10-CM

## 2022-09-18 DIAGNOSIS — Z86.14: ICD-10-CM

## 2022-09-18 DIAGNOSIS — Z79.899: ICD-10-CM

## 2022-09-18 DIAGNOSIS — J44.9: ICD-10-CM

## 2022-09-18 DIAGNOSIS — F15.11: ICD-10-CM

## 2022-09-18 DIAGNOSIS — Z88.6: ICD-10-CM

## 2022-09-18 DIAGNOSIS — K74.60: ICD-10-CM

## 2022-09-18 LAB
ALBUMIN LEVEL: 2.9 G/DL (ref 3.5–5)
ALBUMIN/GLOB SERPL: 0.7 {RATIO} (ref 1.1–1.8)
ALP ISO SERPL-ACNC: 210 U/L (ref 38–126)
ALT SERPLBLD-CCNC: 45 U/L (ref 12–78)
ANION GAP SERPL CALC-SCNC: 10.2 MEQ/L (ref 5–15)
AST SERPL QL: 89 U/L (ref 17–59)
BILIRUBIN,TOTAL: 2.6 MG/DL (ref 0.2–1.3)
BUN SERPL-MCNC: 9 MG/DL (ref 9–20)
CALCIUM SPEC-MCNC: 7.4 MG/DL (ref 8.4–10.2)
CHLORIDE SPEC-SCNC: 106 MMOL/L (ref 98–107)
CO2 SERPL-SCNC: 29 MMOL/L (ref 22–30)
CREAT BLD-SCNC: 0.7 MG/DL (ref 0.66–1.25)
CREATININE CLEARANCE ESTIMATED: 221 ML/MIN (ref 50–200)
CRP SERPL HS-MCNC: 9.1 MG/L (ref 0–4)
ESTIMATED GLOMERULAR FILT RATE: 120 ML/MIN (ref 60–?)
GFR (AFRICAN AMERICAN): 145 ML/MIN (ref 60–?)
GLOBULIN SER CALC-MCNC: 4.4 G/DL (ref 1.3–3.2)
GLUCOSE: 100 MG/DL (ref 74–100)
HCT VFR BLD CALC: 36.2 % (ref 42–52)
HGB BLD-MCNC: 12.1 G/DL (ref 14.1–18)
MCHC RBC-ENTMCNC: 33.3 G/DL (ref 31.8–35.4)
MCV RBC: 96.1 FL (ref 80–94)
MEAN CORPUSCULAR HEMOGLOBIN: 32 PG (ref 27–31.2)
PLATELET # BLD: 109 K/MM3 (ref 142–424)
POTASSIUM: 3.2 MMOL/L (ref 3.5–5.1)
PROT SERPL-MCNC: 7.3 G/DL (ref 6.3–8.2)
RBC # BLD AUTO: 3.77 M/MM3 (ref 4.6–6.2)
SODIUM SPEC-SCNC: 142 MMOL/L (ref 136–145)
WBC # BLD AUTO: 4.6 K/MM3 (ref 4.8–10.8)

## 2022-09-18 PROCEDURE — 83605 ASSAY OF LACTIC ACID: CPT

## 2022-09-18 PROCEDURE — 87077 CULTURE AEROBIC IDENTIFY: CPT

## 2022-09-18 PROCEDURE — 96375 TX/PRO/DX INJ NEW DRUG ADDON: CPT

## 2022-09-18 PROCEDURE — 87070 CULTURE OTHR SPECIMN AEROBIC: CPT

## 2022-09-18 PROCEDURE — 85025 COMPLETE CBC W/AUTO DIFF WBC: CPT

## 2022-09-18 PROCEDURE — 87205 SMEAR GRAM STAIN: CPT

## 2022-09-18 PROCEDURE — 87186 SC STD MICRODIL/AGAR DIL: CPT

## 2022-09-18 PROCEDURE — 99284 EMERGENCY DEPT VISIT MOD MDM: CPT

## 2022-09-18 PROCEDURE — 80053 COMPREHEN METABOLIC PANEL: CPT

## 2022-09-18 PROCEDURE — 86140 C-REACTIVE PROTEIN: CPT

## 2022-09-18 PROCEDURE — 73590 X-RAY EXAM OF LOWER LEG: CPT

## 2022-09-18 PROCEDURE — 85651 RBC SED RATE NONAUTOMATED: CPT

## 2022-09-18 PROCEDURE — 96374 THER/PROPH/DIAG INJ IV PUSH: CPT

## 2022-09-18 PROCEDURE — 87040 BLOOD CULTURE FOR BACTERIA: CPT

## 2022-09-23 ENCOUNTER — HOSPITAL ENCOUNTER (EMERGENCY)
Age: 47
Discharge: HOME | End: 2022-09-23
Payer: MEDICARE

## 2022-09-23 VITALS
SYSTOLIC BLOOD PRESSURE: 169 MMHG | HEART RATE: 109 BPM | DIASTOLIC BLOOD PRESSURE: 93 MMHG | RESPIRATION RATE: 19 BRPM | TEMPERATURE: 98.42 F | OXYGEN SATURATION: 99 %

## 2022-09-23 VITALS
OXYGEN SATURATION: 100 % | HEART RATE: 101 BPM | RESPIRATION RATE: 18 BRPM | DIASTOLIC BLOOD PRESSURE: 87 MMHG | SYSTOLIC BLOOD PRESSURE: 127 MMHG

## 2022-09-23 VITALS
OXYGEN SATURATION: 99 % | DIASTOLIC BLOOD PRESSURE: 84 MMHG | HEART RATE: 99 BPM | SYSTOLIC BLOOD PRESSURE: 180 MMHG | RESPIRATION RATE: 18 BRPM

## 2022-09-23 VITALS — BODY MASS INDEX: 36.9 KG/M2

## 2022-09-23 VITALS
HEART RATE: 89 BPM | RESPIRATION RATE: 18 BRPM | OXYGEN SATURATION: 97 % | DIASTOLIC BLOOD PRESSURE: 81 MMHG | TEMPERATURE: 98.1 F | SYSTOLIC BLOOD PRESSURE: 160 MMHG

## 2022-09-23 DIAGNOSIS — I83.028: ICD-10-CM

## 2022-09-23 DIAGNOSIS — L03.116: ICD-10-CM

## 2022-09-23 DIAGNOSIS — I50.9: ICD-10-CM

## 2022-09-23 DIAGNOSIS — F14.11: ICD-10-CM

## 2022-09-23 DIAGNOSIS — J44.9: ICD-10-CM

## 2022-09-23 DIAGNOSIS — Z79.899: ICD-10-CM

## 2022-09-23 DIAGNOSIS — Z91.19: ICD-10-CM

## 2022-09-23 DIAGNOSIS — F15.11: ICD-10-CM

## 2022-09-23 DIAGNOSIS — K74.60: ICD-10-CM

## 2022-09-23 DIAGNOSIS — I11.0: ICD-10-CM

## 2022-09-23 LAB
ANION GAP SERPL CALC-SCNC: 9.7 MEQ/L (ref 5–15)
BUN SERPL-MCNC: 10 MG/DL (ref 9–20)
CALCIUM SPEC-MCNC: 7.4 MG/DL (ref 8.4–10.2)
CHLORIDE SPEC-SCNC: 104 MMOL/L (ref 98–107)
CO2 SERPL-SCNC: 29 MMOL/L (ref 22–30)
CREAT BLD-SCNC: 0.7 MG/DL (ref 0.66–1.25)
CREATININE CLEARANCE ESTIMATED: 205 ML/MIN (ref 50–200)
ESTIMATED GLOMERULAR FILT RATE: 120 ML/MIN (ref 60–?)
GFR (AFRICAN AMERICAN): 145 ML/MIN (ref 60–?)
GLUCOSE: 101 MG/DL (ref 74–100)
HCT VFR BLD CALC: 37.1 % (ref 42–52)
HGB BLD-MCNC: 12.1 G/DL (ref 14.1–18)
MCHC RBC-ENTMCNC: 32.5 G/DL (ref 31.8–35.4)
MCV RBC: 100.1 FL (ref 80–94)
MEAN CORPUSCULAR HEMOGLOBIN: 32.5 PG (ref 27–31.2)
PLATELET # BLD: 98 K/MM3 (ref 142–424)
POTASSIUM: 2.7 MMOL/L (ref 3.5–5.1)
RBC # BLD AUTO: 3.7 M/MM3 (ref 4.6–6.2)
SODIUM SPEC-SCNC: 140 MMOL/L (ref 136–145)
WBC # BLD AUTO: 3.8 K/MM3 (ref 4.8–10.8)

## 2022-09-23 PROCEDURE — 96365 THER/PROPH/DIAG IV INF INIT: CPT

## 2022-09-23 PROCEDURE — 73590 X-RAY EXAM OF LOWER LEG: CPT

## 2022-09-23 PROCEDURE — 96366 THER/PROPH/DIAG IV INF ADDON: CPT

## 2022-09-23 PROCEDURE — 99284 EMERGENCY DEPT VISIT MOD MDM: CPT

## 2022-09-23 PROCEDURE — 96375 TX/PRO/DX INJ NEW DRUG ADDON: CPT

## 2022-09-23 PROCEDURE — 80048 BASIC METABOLIC PNL TOTAL CA: CPT

## 2022-09-23 PROCEDURE — 85025 COMPLETE CBC W/AUTO DIFF WBC: CPT

## 2022-10-06 ENCOUNTER — HOSPITAL ENCOUNTER (EMERGENCY)
Age: 47
Discharge: HOME | End: 2022-10-06
Payer: MEDICARE

## 2022-10-06 VITALS
TEMPERATURE: 98.1 F | OXYGEN SATURATION: 97 % | DIASTOLIC BLOOD PRESSURE: 90 MMHG | RESPIRATION RATE: 16 BRPM | SYSTOLIC BLOOD PRESSURE: 156 MMHG | HEART RATE: 89 BPM

## 2022-10-06 VITALS
RESPIRATION RATE: 19 BRPM | OXYGEN SATURATION: 99 % | DIASTOLIC BLOOD PRESSURE: 99 MMHG | SYSTOLIC BLOOD PRESSURE: 167 MMHG | TEMPERATURE: 98.1 F | HEART RATE: 106 BPM

## 2022-10-06 VITALS
DIASTOLIC BLOOD PRESSURE: 108 MMHG | RESPIRATION RATE: 20 BRPM | SYSTOLIC BLOOD PRESSURE: 161 MMHG | OXYGEN SATURATION: 98 % | HEART RATE: 95 BPM

## 2022-10-06 VITALS
HEART RATE: 93 BPM | DIASTOLIC BLOOD PRESSURE: 100 MMHG | OXYGEN SATURATION: 100 % | SYSTOLIC BLOOD PRESSURE: 149 MMHG | RESPIRATION RATE: 18 BRPM

## 2022-10-06 VITALS
SYSTOLIC BLOOD PRESSURE: 158 MMHG | DIASTOLIC BLOOD PRESSURE: 91 MMHG | RESPIRATION RATE: 18 BRPM | OXYGEN SATURATION: 99 % | HEART RATE: 116 BPM

## 2022-10-06 VITALS
DIASTOLIC BLOOD PRESSURE: 113 MMHG | RESPIRATION RATE: 18 BRPM | OXYGEN SATURATION: 98 % | HEART RATE: 102 BPM | SYSTOLIC BLOOD PRESSURE: 178 MMHG

## 2022-10-06 VITALS
SYSTOLIC BLOOD PRESSURE: 167 MMHG | RESPIRATION RATE: 17 BRPM | DIASTOLIC BLOOD PRESSURE: 99 MMHG | HEART RATE: 87 BPM | OXYGEN SATURATION: 100 %

## 2022-10-06 VITALS
HEART RATE: 95 BPM | RESPIRATION RATE: 18 BRPM | DIASTOLIC BLOOD PRESSURE: 94 MMHG | OXYGEN SATURATION: 99 % | SYSTOLIC BLOOD PRESSURE: 146 MMHG

## 2022-10-06 VITALS
RESPIRATION RATE: 18 BRPM | HEART RATE: 97 BPM | OXYGEN SATURATION: 100 % | SYSTOLIC BLOOD PRESSURE: 144 MMHG | DIASTOLIC BLOOD PRESSURE: 91 MMHG

## 2022-10-06 VITALS
HEART RATE: 84 BPM | TEMPERATURE: 98.1 F | OXYGEN SATURATION: 95 % | SYSTOLIC BLOOD PRESSURE: 133 MMHG | RESPIRATION RATE: 17 BRPM | DIASTOLIC BLOOD PRESSURE: 87 MMHG

## 2022-10-06 VITALS — BODY MASS INDEX: 39.9 KG/M2

## 2022-10-06 DIAGNOSIS — Z79.82: ICD-10-CM

## 2022-10-06 DIAGNOSIS — I10: ICD-10-CM

## 2022-10-06 DIAGNOSIS — J44.9: ICD-10-CM

## 2022-10-06 DIAGNOSIS — Z88.6: ICD-10-CM

## 2022-10-06 DIAGNOSIS — L08.9: ICD-10-CM

## 2022-10-06 DIAGNOSIS — L89.511: Primary | ICD-10-CM

## 2022-10-06 DIAGNOSIS — Z79.899: ICD-10-CM

## 2022-10-06 DIAGNOSIS — L89.894: ICD-10-CM

## 2022-10-06 DIAGNOSIS — Z16.39: ICD-10-CM

## 2022-10-06 DIAGNOSIS — B96.5: ICD-10-CM

## 2022-10-06 DIAGNOSIS — B96.1: ICD-10-CM

## 2022-10-06 DIAGNOSIS — Z88.1: ICD-10-CM

## 2022-10-06 DIAGNOSIS — Z91.199: ICD-10-CM

## 2022-10-06 LAB
ANION GAP SERPL CALC-SCNC: 8.7 MEQ/L (ref 5–15)
BUN SERPL-MCNC: 9 MG/DL (ref 9–20)
CALCIUM SPEC-MCNC: 7.7 MG/DL (ref 8.4–10.2)
CHLORIDE SPEC-SCNC: 100 MMOL/L (ref 98–107)
CO2 SERPL-SCNC: 30 MMOL/L (ref 22–30)
CREAT BLD-SCNC: 0.6 MG/DL (ref 0.66–1.25)
CREATININE CLEARANCE ESTIMATED: 258 ML/MIN (ref 50–200)
CRP SERPL HS-MCNC: 19.1 MG/L (ref 0–4)
ESTIMATED GLOMERULAR FILT RATE: 143 ML/MIN (ref 60–?)
GFR (AFRICAN AMERICAN): 173 ML/MIN (ref 60–?)
GLUCOSE: 106 MG/DL (ref 74–100)
HCT VFR BLD CALC: 36 % (ref 42–52)
HGB BLD-MCNC: 11.4 G/DL (ref 14.1–18)
MCHC RBC-ENTMCNC: 31.8 G/DL (ref 31.8–35.4)
MCV RBC: 102.9 FL (ref 80–94)
MEAN CORPUSCULAR HEMOGLOBIN: 32.7 PG (ref 27–31.2)
PLATELET # BLD: 129 K/MM3 (ref 142–424)
POTASSIUM: 3.7 MMOL/L (ref 3.5–5.1)
RBC # BLD AUTO: 3.5 M/MM3 (ref 4.6–6.2)
SODIUM SPEC-SCNC: 135 MMOL/L (ref 136–145)
WBC # BLD AUTO: 6.1 K/MM3 (ref 4.8–10.8)

## 2022-10-06 PROCEDURE — 99284 EMERGENCY DEPT VISIT MOD MDM: CPT

## 2022-10-06 PROCEDURE — 85651 RBC SED RATE NONAUTOMATED: CPT

## 2022-10-06 PROCEDURE — 87205 SMEAR GRAM STAIN: CPT

## 2022-10-06 PROCEDURE — 83605 ASSAY OF LACTIC ACID: CPT

## 2022-10-06 PROCEDURE — 87070 CULTURE OTHR SPECIMN AEROBIC: CPT

## 2022-10-06 PROCEDURE — 86140 C-REACTIVE PROTEIN: CPT

## 2022-10-06 PROCEDURE — 96367 TX/PROPH/DG ADDL SEQ IV INF: CPT

## 2022-10-06 PROCEDURE — 85025 COMPLETE CBC W/AUTO DIFF WBC: CPT

## 2022-10-06 PROCEDURE — 87186 SC STD MICRODIL/AGAR DIL: CPT

## 2022-10-06 PROCEDURE — 73590 X-RAY EXAM OF LOWER LEG: CPT

## 2022-10-06 PROCEDURE — 96365 THER/PROPH/DIAG IV INF INIT: CPT

## 2022-10-06 PROCEDURE — 96366 THER/PROPH/DIAG IV INF ADDON: CPT

## 2022-10-06 PROCEDURE — 87040 BLOOD CULTURE FOR BACTERIA: CPT

## 2022-10-06 PROCEDURE — 80048 BASIC METABOLIC PNL TOTAL CA: CPT

## 2022-10-21 ENCOUNTER — HOSPITAL ENCOUNTER (OUTPATIENT)
Age: 47
End: 2022-10-21
Payer: MEDICARE

## 2022-10-21 DIAGNOSIS — G89.4: Primary | ICD-10-CM

## 2022-10-21 PROCEDURE — 80305 DRUG TEST PRSMV DIR OPT OBS: CPT

## 2022-11-06 ENCOUNTER — HOSPITAL ENCOUNTER (INPATIENT)
Dept: HOSPITAL 22 - ER | Age: 47
LOS: 6 days | Discharge: ANOTHER HOSPITAL | DRG: 64 | End: 2022-11-12
Payer: MEDICARE

## 2022-11-06 VITALS
OXYGEN SATURATION: 100 % | TEMPERATURE: 98 F | SYSTOLIC BLOOD PRESSURE: 109 MMHG | DIASTOLIC BLOOD PRESSURE: 65 MMHG | HEART RATE: 96 BPM | RESPIRATION RATE: 19 BRPM

## 2022-11-06 VITALS
SYSTOLIC BLOOD PRESSURE: 121 MMHG | HEART RATE: 119 BPM | RESPIRATION RATE: 21 BRPM | OXYGEN SATURATION: 98 % | DIASTOLIC BLOOD PRESSURE: 66 MMHG

## 2022-11-06 VITALS
SYSTOLIC BLOOD PRESSURE: 135 MMHG | DIASTOLIC BLOOD PRESSURE: 87 MMHG | RESPIRATION RATE: 34 BRPM | OXYGEN SATURATION: 100 % | HEART RATE: 172 BPM

## 2022-11-06 VITALS
HEART RATE: 138 BPM | SYSTOLIC BLOOD PRESSURE: 113 MMHG | DIASTOLIC BLOOD PRESSURE: 78 MMHG | OXYGEN SATURATION: 98 % | TEMPERATURE: 99.3 F | RESPIRATION RATE: 20 BRPM

## 2022-11-06 VITALS
RESPIRATION RATE: 19 BRPM | OXYGEN SATURATION: 100 % | DIASTOLIC BLOOD PRESSURE: 63 MMHG | TEMPERATURE: 97.8 F | SYSTOLIC BLOOD PRESSURE: 93 MMHG | HEART RATE: 92 BPM

## 2022-11-06 VITALS — TEMPERATURE: 100.04 F | SYSTOLIC BLOOD PRESSURE: 120 MMHG | DIASTOLIC BLOOD PRESSURE: 68 MMHG

## 2022-11-06 VITALS
RESPIRATION RATE: 22 BRPM | TEMPERATURE: 100.76 F | OXYGEN SATURATION: 99 % | DIASTOLIC BLOOD PRESSURE: 73 MMHG | HEART RATE: 68 BPM | SYSTOLIC BLOOD PRESSURE: 120 MMHG

## 2022-11-06 VITALS
SYSTOLIC BLOOD PRESSURE: 101 MMHG | HEART RATE: 123 BPM | RESPIRATION RATE: 20 BRPM | DIASTOLIC BLOOD PRESSURE: 70 MMHG | TEMPERATURE: 99.32 F | OXYGEN SATURATION: 98 %

## 2022-11-06 VITALS
SYSTOLIC BLOOD PRESSURE: 149 MMHG | HEART RATE: 201 BPM | OXYGEN SATURATION: 99 % | RESPIRATION RATE: 43 BRPM | DIASTOLIC BLOOD PRESSURE: 84 MMHG

## 2022-11-06 VITALS
DIASTOLIC BLOOD PRESSURE: 88 MMHG | OXYGEN SATURATION: 99 % | SYSTOLIC BLOOD PRESSURE: 155 MMHG | RESPIRATION RATE: 34 BRPM | HEART RATE: 191 BPM

## 2022-11-06 VITALS
SYSTOLIC BLOOD PRESSURE: 91 MMHG | RESPIRATION RATE: 18 BRPM | OXYGEN SATURATION: 100 % | HEART RATE: 102 BPM | DIASTOLIC BLOOD PRESSURE: 50 MMHG

## 2022-11-06 VITALS
TEMPERATURE: 98.06 F | OXYGEN SATURATION: 100 % | HEART RATE: 95 BPM | RESPIRATION RATE: 18 BRPM | SYSTOLIC BLOOD PRESSURE: 107 MMHG | DIASTOLIC BLOOD PRESSURE: 59 MMHG

## 2022-11-06 VITALS
HEART RATE: 120 BPM | SYSTOLIC BLOOD PRESSURE: 112 MMHG | TEMPERATURE: 99.3 F | OXYGEN SATURATION: 98 % | DIASTOLIC BLOOD PRESSURE: 67 MMHG | RESPIRATION RATE: 20 BRPM

## 2022-11-06 VITALS
DIASTOLIC BLOOD PRESSURE: 67 MMHG | HEART RATE: 168 BPM | RESPIRATION RATE: 18 BRPM | SYSTOLIC BLOOD PRESSURE: 117 MMHG | OXYGEN SATURATION: 99 %

## 2022-11-06 VITALS
TEMPERATURE: 99.14 F | RESPIRATION RATE: 20 BRPM | DIASTOLIC BLOOD PRESSURE: 64 MMHG | OXYGEN SATURATION: 98 % | HEART RATE: 130 BPM | SYSTOLIC BLOOD PRESSURE: 139 MMHG

## 2022-11-06 VITALS
DIASTOLIC BLOOD PRESSURE: 77 MMHG | HEART RATE: 174 BPM | SYSTOLIC BLOOD PRESSURE: 131 MMHG | RESPIRATION RATE: 35 BRPM | OXYGEN SATURATION: 98 %

## 2022-11-06 VITALS — OXYGEN SATURATION: 96 % | HEART RATE: 98 BPM | RESPIRATION RATE: 6 BRPM

## 2022-11-06 VITALS
RESPIRATION RATE: 20 BRPM | TEMPERATURE: 99.32 F | DIASTOLIC BLOOD PRESSURE: 64 MMHG | OXYGEN SATURATION: 97 % | SYSTOLIC BLOOD PRESSURE: 123 MMHG | HEART RATE: 132 BPM

## 2022-11-06 VITALS — HEART RATE: 106 BPM

## 2022-11-06 VITALS
RESPIRATION RATE: 31 BRPM | DIASTOLIC BLOOD PRESSURE: 85 MMHG | OXYGEN SATURATION: 96 % | SYSTOLIC BLOOD PRESSURE: 152 MMHG | HEART RATE: 124 BPM

## 2022-11-06 VITALS
OXYGEN SATURATION: 99 % | DIASTOLIC BLOOD PRESSURE: 61 MMHG | SYSTOLIC BLOOD PRESSURE: 96 MMHG | TEMPERATURE: 99.1 F | HEART RATE: 95 BPM | RESPIRATION RATE: 18 BRPM

## 2022-11-06 VITALS
HEART RATE: 119 BPM | BODY MASS INDEX: 40.4 KG/M2 | BODY MASS INDEX: 89.5 KG/M2 | TEMPERATURE: 102.92 F | DIASTOLIC BLOOD PRESSURE: 87 MMHG | SYSTOLIC BLOOD PRESSURE: 138 MMHG | BODY MASS INDEX: 40.6 KG/M2 | RESPIRATION RATE: 32 BRPM | OXYGEN SATURATION: 98 % | BODY MASS INDEX: 38.5 KG/M2 | BODY MASS INDEX: 37.5 KG/M2 | BODY MASS INDEX: 38 KG/M2

## 2022-11-06 VITALS
HEART RATE: 180 BPM | OXYGEN SATURATION: 96 % | RESPIRATION RATE: 31 BRPM | SYSTOLIC BLOOD PRESSURE: 139 MMHG | DIASTOLIC BLOOD PRESSURE: 108 MMHG

## 2022-11-06 VITALS
DIASTOLIC BLOOD PRESSURE: 93 MMHG | HEART RATE: 166 BPM | RESPIRATION RATE: 22 BRPM | OXYGEN SATURATION: 98 % | SYSTOLIC BLOOD PRESSURE: 149 MMHG

## 2022-11-06 VITALS
DIASTOLIC BLOOD PRESSURE: 71 MMHG | SYSTOLIC BLOOD PRESSURE: 130 MMHG | HEART RATE: 168 BPM | OXYGEN SATURATION: 98 % | RESPIRATION RATE: 34 BRPM

## 2022-11-06 VITALS
HEART RATE: 201 BPM | RESPIRATION RATE: 30 BRPM | SYSTOLIC BLOOD PRESSURE: 116 MMHG | OXYGEN SATURATION: 100 % | DIASTOLIC BLOOD PRESSURE: 92 MMHG

## 2022-11-06 VITALS
TEMPERATURE: 98.1 F | DIASTOLIC BLOOD PRESSURE: 61 MMHG | SYSTOLIC BLOOD PRESSURE: 105 MMHG | HEART RATE: 88 BPM | RESPIRATION RATE: 18 BRPM | OXYGEN SATURATION: 100 %

## 2022-11-06 VITALS — HEART RATE: 132 BPM | OXYGEN SATURATION: 100 % | RESPIRATION RATE: 18 BRPM

## 2022-11-06 VITALS
OXYGEN SATURATION: 99 % | SYSTOLIC BLOOD PRESSURE: 103 MMHG | HEART RATE: 94 BPM | TEMPERATURE: 97.7 F | DIASTOLIC BLOOD PRESSURE: 61 MMHG | RESPIRATION RATE: 18 BRPM

## 2022-11-06 VITALS
SYSTOLIC BLOOD PRESSURE: 106 MMHG | HEART RATE: 102 BPM | OXYGEN SATURATION: 99 % | DIASTOLIC BLOOD PRESSURE: 67 MMHG | RESPIRATION RATE: 18 BRPM

## 2022-11-06 VITALS
RESPIRATION RATE: 22 BRPM | OXYGEN SATURATION: 99 % | SYSTOLIC BLOOD PRESSURE: 132 MMHG | HEART RATE: 173 BPM | DIASTOLIC BLOOD PRESSURE: 80 MMHG

## 2022-11-06 VITALS
SYSTOLIC BLOOD PRESSURE: 90 MMHG | DIASTOLIC BLOOD PRESSURE: 54 MMHG | HEART RATE: 110 BPM | TEMPERATURE: 98.6 F | RESPIRATION RATE: 18 BRPM | OXYGEN SATURATION: 100 %

## 2022-11-06 VITALS
DIASTOLIC BLOOD PRESSURE: 66 MMHG | TEMPERATURE: 99.2 F | OXYGEN SATURATION: 98 % | RESPIRATION RATE: 20 BRPM | HEART RATE: 123 BPM | SYSTOLIC BLOOD PRESSURE: 104 MMHG

## 2022-11-06 VITALS
HEART RATE: 184 BPM | RESPIRATION RATE: 29 BRPM | OXYGEN SATURATION: 98 % | DIASTOLIC BLOOD PRESSURE: 83 MMHG | SYSTOLIC BLOOD PRESSURE: 133 MMHG

## 2022-11-06 VITALS
OXYGEN SATURATION: 98 % | HEART RATE: 163 BPM | SYSTOLIC BLOOD PRESSURE: 122 MMHG | DIASTOLIC BLOOD PRESSURE: 74 MMHG | RESPIRATION RATE: 24 BRPM

## 2022-11-06 VITALS — OXYGEN SATURATION: 40 % | RESPIRATION RATE: 19 BRPM

## 2022-11-06 VITALS — RESPIRATION RATE: 19 BRPM | HEART RATE: 110 BPM | OXYGEN SATURATION: 99 %

## 2022-11-06 VITALS — OXYGEN SATURATION: 99 % | DIASTOLIC BLOOD PRESSURE: 84 MMHG | SYSTOLIC BLOOD PRESSURE: 133 MMHG | HEART RATE: 169 BPM

## 2022-11-06 VITALS
SYSTOLIC BLOOD PRESSURE: 102 MMHG | DIASTOLIC BLOOD PRESSURE: 61 MMHG | RESPIRATION RATE: 18 BRPM | HEART RATE: 99 BPM | OXYGEN SATURATION: 100 %

## 2022-11-06 VITALS
SYSTOLIC BLOOD PRESSURE: 108 MMHG | DIASTOLIC BLOOD PRESSURE: 64 MMHG | TEMPERATURE: 98.06 F | HEART RATE: 95 BPM | RESPIRATION RATE: 18 BRPM | OXYGEN SATURATION: 99 %

## 2022-11-06 VITALS
DIASTOLIC BLOOD PRESSURE: 62 MMHG | RESPIRATION RATE: 18 BRPM | SYSTOLIC BLOOD PRESSURE: 98 MMHG | OXYGEN SATURATION: 100 % | HEART RATE: 106 BPM

## 2022-11-06 VITALS — OXYGEN SATURATION: 98 %

## 2022-11-06 VITALS — OXYGEN SATURATION: 100 %

## 2022-11-06 VITALS
RESPIRATION RATE: 18 BRPM | DIASTOLIC BLOOD PRESSURE: 53 MMHG | HEART RATE: 96 BPM | SYSTOLIC BLOOD PRESSURE: 101 MMHG | OXYGEN SATURATION: 98 %

## 2022-11-06 VITALS
HEART RATE: 103 BPM | SYSTOLIC BLOOD PRESSURE: 102 MMHG | DIASTOLIC BLOOD PRESSURE: 57 MMHG | OXYGEN SATURATION: 97 % | RESPIRATION RATE: 18 BRPM

## 2022-11-06 DIAGNOSIS — I48.20: ICD-10-CM

## 2022-11-06 DIAGNOSIS — G93.40: ICD-10-CM

## 2022-11-06 DIAGNOSIS — F17.210: ICD-10-CM

## 2022-11-06 DIAGNOSIS — I11.0: ICD-10-CM

## 2022-11-06 DIAGNOSIS — I50.20: ICD-10-CM

## 2022-11-06 DIAGNOSIS — I63.81: ICD-10-CM

## 2022-11-06 DIAGNOSIS — I87.2: ICD-10-CM

## 2022-11-06 DIAGNOSIS — J96.21: ICD-10-CM

## 2022-11-06 DIAGNOSIS — J44.9: ICD-10-CM

## 2022-11-06 DIAGNOSIS — E87.0: ICD-10-CM

## 2022-11-06 DIAGNOSIS — L03.115: ICD-10-CM

## 2022-11-06 DIAGNOSIS — R65.20: ICD-10-CM

## 2022-11-06 DIAGNOSIS — E87.6: ICD-10-CM

## 2022-11-06 DIAGNOSIS — T43.651A: ICD-10-CM

## 2022-11-06 DIAGNOSIS — L03.116: ICD-10-CM

## 2022-11-06 DIAGNOSIS — E83.42: ICD-10-CM

## 2022-11-06 DIAGNOSIS — D69.6: ICD-10-CM

## 2022-11-06 DIAGNOSIS — I48.91: ICD-10-CM

## 2022-11-06 DIAGNOSIS — Z59.00: ICD-10-CM

## 2022-11-06 DIAGNOSIS — I10: ICD-10-CM

## 2022-11-06 DIAGNOSIS — K74.69: ICD-10-CM

## 2022-11-06 DIAGNOSIS — F17.290: ICD-10-CM

## 2022-11-06 DIAGNOSIS — I89.0: ICD-10-CM

## 2022-11-06 DIAGNOSIS — A41.9: Primary | ICD-10-CM

## 2022-11-06 LAB
ALBUMIN LEVEL: 2.7 G/DL (ref 3.5–5)
ALBUMIN/GLOB SERPL: 0.7 {RATIO} (ref 1.1–1.8)
ALP ISO SERPL-ACNC: 120 U/L (ref 38–126)
ALT SERPLBLD-CCNC: 58 U/L (ref 12–78)
AMMONIA: 36 UMOL/L (ref 9–30)
ANION GAP SERPL CALC-SCNC: 10.8 MEQ/L (ref 5–15)
ANION GAP SERPL CALC-SCNC: 14.6 MEQ/L (ref 5–15)
ANISOCYTOSIS BLD QL: (no result)
APAP SPEC-MCNC: < 10 UG/ML (ref 10–30)
AST SERPL QL: 118 U/L (ref 17–59)
BILIRUB UR STRIP-MCNC: (no result) MG/DL
BILIRUBIN,TOTAL: 3.9 MG/DL (ref 0.2–1.3)
BUN SERPL-MCNC: 27 MG/DL (ref 9–20)
BUN SERPL-MCNC: 36 MG/DL (ref 9–20)
CALCIUM SPEC-MCNC: 7.8 MG/DL (ref 8.4–10.2)
CALCIUM SPEC-MCNC: 8.3 MG/DL (ref 8.4–10.2)
CELLS COUNTED: 100
CHLORIDE SPEC-SCNC: 102 MMOL/L (ref 98–107)
CHLORIDE SPEC-SCNC: 99 MMOL/L (ref 98–107)
CK SERPL-CCNC: 363 U/L (ref 55–170)
CKMB RELATIVE INDEX: 0.6 U/L (ref 0–4)
CO2 BLDCOA-SCNC: 24.8 MMHG (ref 23–27)
CO2 SERPL-SCNC: 26 MMOL/L (ref 22–30)
CO2 SERPL-SCNC: 26 MMOL/L (ref 22–30)
COLOR UR: (no result)
CREAT BLD-SCNC: 1 MG/DL (ref 0.66–1.25)
CREAT BLD-SCNC: 1.1 MG/DL (ref 0.66–1.25)
CREATININE CLEARANCE ESTIMATED: 86 ML/MIN (ref 50–200)
ESTIMATED GLOMERULAR FILT RATE: 71 ML/MIN (ref 60–?)
ESTIMATED GLOMERULAR FILT RATE: 79 ML/MIN (ref 60–?)
GFR (AFRICAN AMERICAN): 86 ML/MIN (ref 60–?)
GFR (AFRICAN AMERICAN): 96 ML/MIN (ref 60–?)
GLOBULIN SER CALC-MCNC: 4.1 G/DL (ref 1.3–3.2)
GLUCOSE: 111 MG/DL (ref 74–100)
GLUCOSE: 116 MG/DL (ref 74–100)
HCO3 BLDA-SCNC: 23.6 MMHG (ref 22–26)
HCO3 BLDV-SCNC: 23.9 MMOL/L (ref 23–30)
HCT VFR BLD CALC: 32.3 % (ref 42–52)
HCT VFR BLD CALC: 38.6 % (ref 42–52)
HGB BLD-MCNC: 10.7 G/DL (ref 14.1–18)
HGB BLD-MCNC: 12.3 G/DL (ref 14.1–18)
HYPOCHROMIA BLD QL: (no result)
INR PPP: 1.94 (ref 0.9–1.1)
MACROCYTES BLD QL: (no result)
MAGNESIUM: 1.5 MG/DL (ref 1.6–2.3)
MANUAL DIFFERENTIAL: (no result)
MCHC RBC-ENTMCNC: 31.7 G/DL (ref 31.8–35.4)
MCHC RBC-ENTMCNC: 33.1 G/DL (ref 31.8–35.4)
MCV RBC: 102.6 FL (ref 80–94)
MCV RBC: 98.2 FL (ref 80–94)
MEAN CORPUSCULAR HEMOGLOBIN: 32.4 PG (ref 27–31.2)
MEAN CORPUSCULAR HEMOGLOBIN: 32.6 PG (ref 27–31.2)
MICRO URNS: (no result)
MONONUC CELLS # CSF MANUAL: 0 %
NEUTROPHILS # CSF: 0 %
PCO2 BLDA: 38.3 MMHG (ref 35–45)
PCO2 BLDV: 41.8 MMOL/L (ref 35–51)
PEEP RESPIRATORY: 5 CM[H2O]
PH BLDA: 7.41 MMOL/L (ref 7.35–7.45)
PH BLDV: 7.38 MMOL/L (ref 7.31–7.41)
PH UR: 6 [PH] (ref 5–8.5)
PLATELET # BLD: 55 K/MM3 (ref 142–424)
PLATELET # BLD: 59 K/MM3 (ref 142–424)
PO2 BLDA: 476.9 MMHG (ref 80–100)
PO2 BLDV: 51.9 MMOL/L (ref 28–40)
POTASSIUM: 3.6 MMOL/L (ref 3.5–5.1)
POTASSIUM: 3.8 MMOL/L (ref 3.5–5.1)
PROT SERPL-MCNC: 6.8 G/DL (ref 6.3–8.2)
PROT UR STRIP-MCNC: (no result) MG/DL
PT BLD: 20.2 SECONDS (ref 10.1–12.5)
RBC # BLD AUTO: 3.29 M/MM3 (ref 4.6–6.2)
RBC # BLD AUTO: 3.76 M/MM3 (ref 4.6–6.2)
RBC # CSF AUTO: 0 CELLS/UL
RBC #/AREA URNS HPF: (no result) #/HPF (ref 0–3)
REFLEX LACTIC: (no result)
SALICYLATES BLD-MCNC: < 1 MG/DL (ref 2–20)
SODIUM SPEC-SCNC: 135 MMOL/L (ref 136–145)
SODIUM SPEC-SCNC: 136 MMOL/L (ref 136–145)
SP GR UR: 1.02 (ref 1–1.03)
SQUAMOUS URNS QL MICRO: (no result) #/HPF (ref 0–5)
TROPONIN I: 0.09 NG/ML (ref 0–0.03)
TROPONIN I: 0.09 NG/ML (ref 0–0.03)
TSH SERPL-ACNC: 1.34 UIU/ML (ref 0.47–4.68)
UROBILINOGEN UR QL: 2 EU/DL
VOLUME,CSF: 12 ML
WBC # BLD AUTO: 11.5 K/MM3 (ref 4.8–10.8)
WBC # BLD AUTO: 4.7 K/MM3 (ref 4.8–10.8)
WBC # CSF: 2 CELLS/UL (ref 0–5)

## 2022-11-06 PROCEDURE — 97167 OT EVAL HIGH COMPLEX 60 MIN: CPT

## 2022-11-06 PROCEDURE — 82803 BLOOD GASES ANY COMBINATION: CPT

## 2022-11-06 PROCEDURE — 85025 COMPLETE CBC W/AUTO DIFF WBC: CPT

## 2022-11-06 PROCEDURE — 62272 THER SPI PNXR DRG CSF: CPT

## 2022-11-06 PROCEDURE — U0005 INFEC AGEN DETEC AMPLI PROBE: HCPCS

## 2022-11-06 PROCEDURE — 84443 ASSAY THYROID STIM HORMONE: CPT

## 2022-11-06 PROCEDURE — 92610 EVALUATE SWALLOWING FUNCTION: CPT

## 2022-11-06 PROCEDURE — 97163 PT EVAL HIGH COMPLEX 45 MIN: CPT

## 2022-11-06 PROCEDURE — 80048 BASIC METABOLIC PNL TOTAL CA: CPT

## 2022-11-06 PROCEDURE — 94761 N-INVAS EAR/PLS OXIMETRY MLT: CPT

## 2022-11-06 PROCEDURE — P9017 PLASMA 1 DONOR FRZ W/IN 8 HR: HCPCS

## 2022-11-06 PROCEDURE — 87070 CULTURE OTHR SPECIMN AEROBIC: CPT

## 2022-11-06 PROCEDURE — P9034 PLATELETS, PHERESIS: HCPCS

## 2022-11-06 PROCEDURE — 87040 BLOOD CULTURE FOR BACTERIA: CPT

## 2022-11-06 PROCEDURE — 86901 BLOOD TYPING SEROLOGIC RH(D): CPT

## 2022-11-06 PROCEDURE — 71045 X-RAY EXAM CHEST 1 VIEW: CPT

## 2022-11-06 PROCEDURE — 87641 MR-STAPH DNA AMP PROBE: CPT

## 2022-11-06 PROCEDURE — C9803 HOPD COVID-19 SPEC COLLECT: HCPCS

## 2022-11-06 PROCEDURE — 81001 URINALYSIS AUTO W/SCOPE: CPT

## 2022-11-06 PROCEDURE — 82550 ASSAY OF CK (CPK): CPT

## 2022-11-06 PROCEDURE — 85007 BL SMEAR W/DIFF WBC COUNT: CPT

## 2022-11-06 PROCEDURE — 82140 ASSAY OF AMMONIA: CPT

## 2022-11-06 PROCEDURE — 87086 URINE CULTURE/COLONY COUNT: CPT

## 2022-11-06 PROCEDURE — 87186 SC STD MICRODIL/AGAR DIL: CPT

## 2022-11-06 PROCEDURE — 89051 BODY FLUID CELL COUNT: CPT

## 2022-11-06 PROCEDURE — 80329 ANALGESICS NON-OPIOID 1 OR 2: CPT

## 2022-11-06 PROCEDURE — 80307 DRUG TEST PRSMV CHEM ANLYZR: CPT

## 2022-11-06 PROCEDURE — 83735 ASSAY OF MAGNESIUM: CPT

## 2022-11-06 PROCEDURE — 85610 PROTHROMBIN TIME: CPT

## 2022-11-06 PROCEDURE — U0003 INFECTIOUS AGENT DETECTION BY NUCLEIC ACID (DNA OR RNA); SEVERE ACUTE RESPIRATORY SYNDROME CORONAVIRUS 2 (SARS-COV-2) (CORONAVIRUS DISEASE [COVID-19]), AMPLIFIED PROBE TECHNIQUE, MAKING USE OF HIGH THROUGHPUT TECHNOLOGIES AS DESCRIBED BY CMS-2020-01-R: HCPCS

## 2022-11-06 PROCEDURE — 84484 ASSAY OF TROPONIN QUANT: CPT

## 2022-11-06 PROCEDURE — 83880 ASSAY OF NATRIURETIC PEPTIDE: CPT

## 2022-11-06 PROCEDURE — 80202 ASSAY OF VANCOMYCIN: CPT

## 2022-11-06 PROCEDURE — 80053 COMPREHEN METABOLIC PANEL: CPT

## 2022-11-06 PROCEDURE — 70551 MRI BRAIN STEM W/O DYE: CPT

## 2022-11-06 PROCEDURE — 51702 INSERT TEMP BLADDER CATH: CPT

## 2022-11-06 PROCEDURE — 83605 ASSAY OF LACTIC ACID: CPT

## 2022-11-06 PROCEDURE — 84100 ASSAY OF PHOSPHORUS: CPT

## 2022-11-06 PROCEDURE — 80305 DRUG TEST PRSMV DIR OPT OBS: CPT

## 2022-11-06 PROCEDURE — 93005 ELECTROCARDIOGRAM TRACING: CPT

## 2022-11-06 PROCEDURE — 85730 THROMBOPLASTIN TIME PARTIAL: CPT

## 2022-11-06 PROCEDURE — 94640 AIRWAY INHALATION TREATMENT: CPT

## 2022-11-06 PROCEDURE — 85651 RBC SED RATE NONAUTOMATED: CPT

## 2022-11-06 PROCEDURE — 87081 CULTURE SCREEN ONLY: CPT

## 2022-11-06 PROCEDURE — 82553 CREATINE MB FRACTION: CPT

## 2022-11-06 PROCEDURE — 31500 INSERT EMERGENCY AIRWAY: CPT

## 2022-11-06 PROCEDURE — 99291 CRITICAL CARE FIRST HOUR: CPT

## 2022-11-06 PROCEDURE — 36415 COLL VENOUS BLD VENIPUNCTURE: CPT

## 2022-11-06 PROCEDURE — 87077 CULTURE AEROBIC IDENTIFY: CPT

## 2022-11-06 PROCEDURE — 70450 CT HEAD/BRAIN W/O DYE: CPT

## 2022-11-06 PROCEDURE — 94003 VENT MGMT INPAT SUBQ DAY: CPT

## 2022-11-06 PROCEDURE — 94002 VENT MGMT INPAT INIT DAY: CPT

## 2022-11-06 PROCEDURE — 73700 CT LOWER EXTREMITY W/O DYE: CPT

## 2022-11-06 PROCEDURE — 93971 EXTREMITY STUDY: CPT

## 2022-11-06 PROCEDURE — 86900 BLOOD TYPING SEROLOGIC ABO: CPT

## 2022-11-06 PROCEDURE — 93306 TTE W/DOPPLER COMPLETE: CPT

## 2022-11-06 PROCEDURE — 87205 SMEAR GRAM STAIN: CPT

## 2022-11-06 SDOH — ECONOMIC STABILITY - HOUSING INSECURITY: HOMELESSNESS UNSPECIFIED: Z59.00

## 2022-11-07 VITALS
OXYGEN SATURATION: 98 % | RESPIRATION RATE: 16 BRPM | SYSTOLIC BLOOD PRESSURE: 108 MMHG | DIASTOLIC BLOOD PRESSURE: 65 MMHG | HEART RATE: 98 BPM

## 2022-11-07 VITALS
DIASTOLIC BLOOD PRESSURE: 95 MMHG | OXYGEN SATURATION: 100 % | RESPIRATION RATE: 16 BRPM | HEART RATE: 108 BPM | SYSTOLIC BLOOD PRESSURE: 152 MMHG

## 2022-11-07 VITALS
TEMPERATURE: 99.14 F | HEART RATE: 104 BPM | RESPIRATION RATE: 104 BRPM | OXYGEN SATURATION: 100 % | SYSTOLIC BLOOD PRESSURE: 125 MMHG | DIASTOLIC BLOOD PRESSURE: 86 MMHG

## 2022-11-07 VITALS
OXYGEN SATURATION: 99 % | DIASTOLIC BLOOD PRESSURE: 83 MMHG | SYSTOLIC BLOOD PRESSURE: 136 MMHG | HEART RATE: 120 BPM | RESPIRATION RATE: 15 BRPM

## 2022-11-07 VITALS
OXYGEN SATURATION: 100 % | TEMPERATURE: 98.9 F | DIASTOLIC BLOOD PRESSURE: 81 MMHG | HEART RATE: 99 BPM | RESPIRATION RATE: 17 BRPM | SYSTOLIC BLOOD PRESSURE: 116 MMHG

## 2022-11-07 VITALS
TEMPERATURE: 98 F | OXYGEN SATURATION: 100 % | RESPIRATION RATE: 18 BRPM | DIASTOLIC BLOOD PRESSURE: 68 MMHG | HEART RATE: 90 BPM | SYSTOLIC BLOOD PRESSURE: 109 MMHG

## 2022-11-07 VITALS
RESPIRATION RATE: 14 BRPM | HEART RATE: 99 BPM | DIASTOLIC BLOOD PRESSURE: 80 MMHG | TEMPERATURE: 99.14 F | OXYGEN SATURATION: 100 % | SYSTOLIC BLOOD PRESSURE: 127 MMHG

## 2022-11-07 VITALS
RESPIRATION RATE: 15 BRPM | SYSTOLIC BLOOD PRESSURE: 132 MMHG | OXYGEN SATURATION: 100 % | DIASTOLIC BLOOD PRESSURE: 102 MMHG | TEMPERATURE: 98.1 F

## 2022-11-07 VITALS
OXYGEN SATURATION: 100 % | SYSTOLIC BLOOD PRESSURE: 124 MMHG | DIASTOLIC BLOOD PRESSURE: 77 MMHG | TEMPERATURE: 99.5 F | RESPIRATION RATE: 16 BRPM | HEART RATE: 103 BPM

## 2022-11-07 VITALS — RESPIRATION RATE: 17 BRPM | OXYGEN SATURATION: 100 %

## 2022-11-07 VITALS
HEART RATE: 96 BPM | SYSTOLIC BLOOD PRESSURE: 105 MMHG | OXYGEN SATURATION: 99 % | DIASTOLIC BLOOD PRESSURE: 59 MMHG | RESPIRATION RATE: 18 BRPM

## 2022-11-07 VITALS
OXYGEN SATURATION: 98 % | RESPIRATION RATE: 19 BRPM | HEART RATE: 123 BPM | SYSTOLIC BLOOD PRESSURE: 109 MMHG | DIASTOLIC BLOOD PRESSURE: 68 MMHG | TEMPERATURE: 99.86 F

## 2022-11-07 VITALS
DIASTOLIC BLOOD PRESSURE: 73 MMHG | HEART RATE: 122 BPM | OXYGEN SATURATION: 95 % | SYSTOLIC BLOOD PRESSURE: 131 MMHG | RESPIRATION RATE: 15 BRPM

## 2022-11-07 VITALS
HEART RATE: 85 BPM | DIASTOLIC BLOOD PRESSURE: 71 MMHG | RESPIRATION RATE: 16 BRPM | OXYGEN SATURATION: 100 % | SYSTOLIC BLOOD PRESSURE: 102 MMHG

## 2022-11-07 VITALS
DIASTOLIC BLOOD PRESSURE: 88 MMHG | HEART RATE: 100 BPM | RESPIRATION RATE: 14 BRPM | SYSTOLIC BLOOD PRESSURE: 123 MMHG | OXYGEN SATURATION: 100 %

## 2022-11-07 VITALS
SYSTOLIC BLOOD PRESSURE: 101 MMHG | HEART RATE: 80 BPM | OXYGEN SATURATION: 100 % | RESPIRATION RATE: 18 BRPM | TEMPERATURE: 97.88 F | DIASTOLIC BLOOD PRESSURE: 54 MMHG

## 2022-11-07 VITALS
DIASTOLIC BLOOD PRESSURE: 81 MMHG | HEART RATE: 109 BPM | RESPIRATION RATE: 17 BRPM | OXYGEN SATURATION: 99 % | SYSTOLIC BLOOD PRESSURE: 125 MMHG

## 2022-11-07 VITALS
DIASTOLIC BLOOD PRESSURE: 74 MMHG | OXYGEN SATURATION: 100 % | RESPIRATION RATE: 19 BRPM | SYSTOLIC BLOOD PRESSURE: 114 MMHG | HEART RATE: 96 BPM

## 2022-11-07 VITALS
OXYGEN SATURATION: 100 % | SYSTOLIC BLOOD PRESSURE: 138 MMHG | HEART RATE: 117 BPM | DIASTOLIC BLOOD PRESSURE: 97 MMHG | RESPIRATION RATE: 17 BRPM | TEMPERATURE: 98.24 F

## 2022-11-07 VITALS — TEMPERATURE: 99.32 F

## 2022-11-07 VITALS
TEMPERATURE: 98.78 F | RESPIRATION RATE: 15 BRPM | HEART RATE: 113 BPM | OXYGEN SATURATION: 100 % | SYSTOLIC BLOOD PRESSURE: 124 MMHG | DIASTOLIC BLOOD PRESSURE: 88 MMHG

## 2022-11-07 VITALS
RESPIRATION RATE: 15 BRPM | DIASTOLIC BLOOD PRESSURE: 67 MMHG | TEMPERATURE: 99.32 F | HEART RATE: 109 BPM | OXYGEN SATURATION: 100 % | SYSTOLIC BLOOD PRESSURE: 124 MMHG

## 2022-11-07 VITALS
RESPIRATION RATE: 18 BRPM | SYSTOLIC BLOOD PRESSURE: 91 MMHG | OXYGEN SATURATION: 99 % | HEART RATE: 88 BPM | DIASTOLIC BLOOD PRESSURE: 52 MMHG

## 2022-11-07 VITALS — RESPIRATION RATE: 18 BRPM

## 2022-11-07 VITALS — RESPIRATION RATE: 16 BRPM | HEART RATE: 101 BPM | OXYGEN SATURATION: 99 %

## 2022-11-07 VITALS
RESPIRATION RATE: 16 BRPM | DIASTOLIC BLOOD PRESSURE: 81 MMHG | SYSTOLIC BLOOD PRESSURE: 129 MMHG | HEART RATE: 99 BPM | OXYGEN SATURATION: 100 %

## 2022-11-07 VITALS
TEMPERATURE: 98.6 F | OXYGEN SATURATION: 100 % | RESPIRATION RATE: 14 BRPM | HEART RATE: 104 BPM | DIASTOLIC BLOOD PRESSURE: 74 MMHG | SYSTOLIC BLOOD PRESSURE: 128 MMHG

## 2022-11-07 VITALS
RESPIRATION RATE: 18 BRPM | HEART RATE: 86 BPM | OXYGEN SATURATION: 99 % | SYSTOLIC BLOOD PRESSURE: 96 MMHG | DIASTOLIC BLOOD PRESSURE: 56 MMHG

## 2022-11-07 VITALS — HEART RATE: 107 BPM

## 2022-11-07 VITALS
SYSTOLIC BLOOD PRESSURE: 110 MMHG | HEART RATE: 100 BPM | DIASTOLIC BLOOD PRESSURE: 58 MMHG | RESPIRATION RATE: 17 BRPM | OXYGEN SATURATION: 98 %

## 2022-11-07 VITALS
RESPIRATION RATE: 18 BRPM | SYSTOLIC BLOOD PRESSURE: 125 MMHG | DIASTOLIC BLOOD PRESSURE: 79 MMHG | HEART RATE: 102 BPM | OXYGEN SATURATION: 100 %

## 2022-11-07 VITALS
SYSTOLIC BLOOD PRESSURE: 126 MMHG | RESPIRATION RATE: 14 BRPM | TEMPERATURE: 99.32 F | DIASTOLIC BLOOD PRESSURE: 80 MMHG | OXYGEN SATURATION: 100 % | HEART RATE: 100 BPM

## 2022-11-07 VITALS
DIASTOLIC BLOOD PRESSURE: 100 MMHG | TEMPERATURE: 98.3 F | RESPIRATION RATE: 15 BRPM | SYSTOLIC BLOOD PRESSURE: 128 MMHG | HEART RATE: 129 BPM | OXYGEN SATURATION: 100 %

## 2022-11-07 VITALS
DIASTOLIC BLOOD PRESSURE: 76 MMHG | HEART RATE: 109 BPM | RESPIRATION RATE: 14 BRPM | SYSTOLIC BLOOD PRESSURE: 121 MMHG | OXYGEN SATURATION: 99 % | TEMPERATURE: 98.6 F

## 2022-11-07 VITALS
SYSTOLIC BLOOD PRESSURE: 99 MMHG | OXYGEN SATURATION: 99 % | HEART RATE: 91 BPM | DIASTOLIC BLOOD PRESSURE: 60 MMHG | RESPIRATION RATE: 18 BRPM

## 2022-11-07 VITALS
DIASTOLIC BLOOD PRESSURE: 85 MMHG | OXYGEN SATURATION: 100 % | TEMPERATURE: 98.24 F | RESPIRATION RATE: 16 BRPM | HEART RATE: 120 BPM | SYSTOLIC BLOOD PRESSURE: 147 MMHG

## 2022-11-07 VITALS
DIASTOLIC BLOOD PRESSURE: 83 MMHG | TEMPERATURE: 98.96 F | HEART RATE: 115 BPM | OXYGEN SATURATION: 100 % | SYSTOLIC BLOOD PRESSURE: 126 MMHG | RESPIRATION RATE: 15 BRPM

## 2022-11-07 VITALS
RESPIRATION RATE: 16 BRPM | DIASTOLIC BLOOD PRESSURE: 83 MMHG | HEART RATE: 101 BPM | OXYGEN SATURATION: 100 % | SYSTOLIC BLOOD PRESSURE: 117 MMHG

## 2022-11-07 VITALS — RESPIRATION RATE: 27 BRPM | OXYGEN SATURATION: 100 %

## 2022-11-07 VITALS — RESPIRATION RATE: 16 BRPM

## 2022-11-07 VITALS
SYSTOLIC BLOOD PRESSURE: 128 MMHG | OXYGEN SATURATION: 100 % | HEART RATE: 104 BPM | RESPIRATION RATE: 15 BRPM | TEMPERATURE: 99 F | DIASTOLIC BLOOD PRESSURE: 85 MMHG

## 2022-11-07 VITALS
RESPIRATION RATE: 16 BRPM | SYSTOLIC BLOOD PRESSURE: 124 MMHG | DIASTOLIC BLOOD PRESSURE: 79 MMHG | HEART RATE: 111 BPM | TEMPERATURE: 98.6 F | OXYGEN SATURATION: 100 %

## 2022-11-07 VITALS
HEART RATE: 140 BPM | SYSTOLIC BLOOD PRESSURE: 124 MMHG | OXYGEN SATURATION: 99 % | TEMPERATURE: 100.7 F | DIASTOLIC BLOOD PRESSURE: 102 MMHG | RESPIRATION RATE: 27 BRPM

## 2022-11-07 VITALS — TEMPERATURE: 101.12 F

## 2022-11-07 VITALS
RESPIRATION RATE: 17 BRPM | OXYGEN SATURATION: 100 % | TEMPERATURE: 98.2 F | SYSTOLIC BLOOD PRESSURE: 133 MMHG | DIASTOLIC BLOOD PRESSURE: 85 MMHG | HEART RATE: 108 BPM

## 2022-11-07 VITALS — HEART RATE: 89 BPM

## 2022-11-07 VITALS
TEMPERATURE: 98.78 F | HEART RATE: 107 BPM | OXYGEN SATURATION: 99 % | SYSTOLIC BLOOD PRESSURE: 125 MMHG | RESPIRATION RATE: 15 BRPM | DIASTOLIC BLOOD PRESSURE: 89 MMHG

## 2022-11-07 VITALS
TEMPERATURE: 98.3 F | SYSTOLIC BLOOD PRESSURE: 133 MMHG | RESPIRATION RATE: 17 BRPM | DIASTOLIC BLOOD PRESSURE: 87 MMHG | OXYGEN SATURATION: 100 % | HEART RATE: 120 BPM

## 2022-11-07 VITALS — RESPIRATION RATE: 15 BRPM | OXYGEN SATURATION: 99 %

## 2022-11-07 VITALS
OXYGEN SATURATION: 98 % | HEART RATE: 109 BPM | DIASTOLIC BLOOD PRESSURE: 79 MMHG | SYSTOLIC BLOOD PRESSURE: 118 MMHG | RESPIRATION RATE: 19 BRPM

## 2022-11-07 VITALS — OXYGEN SATURATION: 99 % | RESPIRATION RATE: 15 BRPM

## 2022-11-07 VITALS
TEMPERATURE: 98.42 F | SYSTOLIC BLOOD PRESSURE: 120 MMHG | DIASTOLIC BLOOD PRESSURE: 76 MMHG | RESPIRATION RATE: 16 BRPM | OXYGEN SATURATION: 100 % | HEART RATE: 98 BPM

## 2022-11-07 VITALS
SYSTOLIC BLOOD PRESSURE: 121 MMHG | RESPIRATION RATE: 15 BRPM | DIASTOLIC BLOOD PRESSURE: 82 MMHG | HEART RATE: 98 BPM | TEMPERATURE: 99 F | OXYGEN SATURATION: 100 %

## 2022-11-07 VITALS — HEART RATE: 109 BPM

## 2022-11-07 VITALS — OXYGEN SATURATION: 100 %

## 2022-11-07 LAB
ALBUMIN LEVEL: 2 G/DL (ref 3.5–5)
ALBUMIN/GLOB SERPL: 0.6 {RATIO} (ref 1.1–1.8)
ALP ISO SERPL-ACNC: 81 U/L (ref 38–126)
ALT SERPLBLD-CCNC: 36 U/L (ref 12–78)
ANION GAP SERPL CALC-SCNC: 12.6 MEQ/L (ref 5–15)
ANION GAP SERPL CALC-SCNC: 8.2 MEQ/L (ref 5–15)
APTT PPP: 35.5 SECONDS (ref 22.8–30.6)
AST SERPL QL: 74 U/L (ref 17–59)
BILIRUBIN,TOTAL: 2.4 MG/DL (ref 0.2–1.3)
BUN SERPL-MCNC: 31 MG/DL (ref 9–20)
BUN SERPL-MCNC: 37 MG/DL (ref 9–20)
CALCIUM SPEC-MCNC: 7.7 MG/DL (ref 8.4–10.2)
CALCIUM SPEC-MCNC: 8.3 MG/DL (ref 8.4–10.2)
CHLORIDE SPEC-SCNC: 103 MMOL/L (ref 98–107)
CHLORIDE SPEC-SCNC: 104 MMOL/L (ref 98–107)
CO2 BLDCOA-SCNC: 28.1 MMHG (ref 23–27)
CO2 SERPL-SCNC: 28 MMOL/L (ref 22–30)
CO2 SERPL-SCNC: 30 MMOL/L (ref 22–30)
CREAT BLD-SCNC: 0.9 MG/DL (ref 0.66–1.25)
CREAT BLD-SCNC: 0.9 MG/DL (ref 0.66–1.25)
CREATININE CLEARANCE ESTIMATED: 96 ML/MIN (ref 50–200)
CREATININE CLEARANCE ESTIMATED: 96 ML/MIN (ref 50–200)
ESTIMATED GLOMERULAR FILT RATE: 90 ML/MIN (ref 60–?)
ESTIMATED GLOMERULAR FILT RATE: 90 ML/MIN (ref 60–?)
GFR (AFRICAN AMERICAN): 109 ML/MIN (ref 60–?)
GFR (AFRICAN AMERICAN): 109 ML/MIN (ref 60–?)
GLOBULIN SER CALC-MCNC: 3.3 G/DL (ref 1.3–3.2)
GLUCOSE: 98 MG/DL (ref 74–100)
GLUCOSE: 99 MG/DL (ref 74–100)
HCO3 BLDA-SCNC: 26.9 MMHG (ref 22–26)
HCT VFR BLD CALC: 33.3 % (ref 42–52)
HCT VFR BLD CALC: 35.1 % (ref 42–52)
HGB BLD-MCNC: 10.7 G/DL (ref 14.1–18)
HGB BLD-MCNC: 11.6 G/DL (ref 14.1–18)
INR PPP: 1.42 (ref 0.9–1.1)
MAGNESIUM: 2 MG/DL (ref 1.6–2.3)
MCHC RBC-ENTMCNC: 32 G/DL (ref 31.8–35.4)
MCHC RBC-ENTMCNC: 32.9 G/DL (ref 31.8–35.4)
MCV RBC: 100.8 FL (ref 80–94)
MCV RBC: 98.4 FL (ref 80–94)
MEAN CORPUSCULAR HEMOGLOBIN: 32.3 PG (ref 27–31.2)
MEAN CORPUSCULAR HEMOGLOBIN: 32.3 PG (ref 27–31.2)
PCO2 BLDA: 38.5 MMHG (ref 35–45)
PH BLDA: 7.46 MMOL/L (ref 7.35–7.45)
PHOSPHOROUS: 2.6 MG/DL (ref 2.5–4.5)
PLATELET # BLD: 45 K/MM3 (ref 142–424)
PLATELET # BLD: 75 K/MM3 (ref 142–424)
PO2 BLDA: 97.8 MMHG (ref 80–100)
POTASSIUM: 3.2 MMOL/L (ref 3.5–5.1)
POTASSIUM: 3.6 MMOL/L (ref 3.5–5.1)
PROT SERPL-MCNC: 5.3 G/DL (ref 6.3–8.2)
PT BLD: 15 SECONDS (ref 10.1–12.5)
RBC # BLD AUTO: 3.3 M/MM3 (ref 4.6–6.2)
RBC # BLD AUTO: 3.57 M/MM3 (ref 4.6–6.2)
SODIUM SPEC-SCNC: 139 MMOL/L (ref 136–145)
SODIUM SPEC-SCNC: 140 MMOL/L (ref 136–145)
VANCOMYCIN TROUGH SERPL-MCNC: 20.8 UG/ML (ref 5–10)
WBC # BLD AUTO: 3.4 K/MM3 (ref 4.8–10.8)
WBC # BLD AUTO: 5.7 K/MM3 (ref 4.8–10.8)

## 2022-11-08 VITALS — HEART RATE: 106 BPM

## 2022-11-08 VITALS
TEMPERATURE: 98.4 F | DIASTOLIC BLOOD PRESSURE: 86 MMHG | SYSTOLIC BLOOD PRESSURE: 161 MMHG | HEART RATE: 140 BPM | OXYGEN SATURATION: 98 % | RESPIRATION RATE: 23 BRPM

## 2022-11-08 VITALS
DIASTOLIC BLOOD PRESSURE: 70 MMHG | RESPIRATION RATE: 15 BRPM | OXYGEN SATURATION: 95 % | SYSTOLIC BLOOD PRESSURE: 103 MMHG | HEART RATE: 85 BPM

## 2022-11-08 VITALS — RESPIRATION RATE: 21 BRPM | OXYGEN SATURATION: 98 %

## 2022-11-08 VITALS
SYSTOLIC BLOOD PRESSURE: 143 MMHG | RESPIRATION RATE: 19 BRPM | OXYGEN SATURATION: 100 % | DIASTOLIC BLOOD PRESSURE: 99 MMHG | HEART RATE: 116 BPM

## 2022-11-08 VITALS
RESPIRATION RATE: 18 BRPM | HEART RATE: 121 BPM | SYSTOLIC BLOOD PRESSURE: 148 MMHG | DIASTOLIC BLOOD PRESSURE: 98 MMHG | OXYGEN SATURATION: 100 %

## 2022-11-08 VITALS — HEART RATE: 91 BPM | RESPIRATION RATE: 16 BRPM | OXYGEN SATURATION: 97 %

## 2022-11-08 VITALS
OXYGEN SATURATION: 94 % | SYSTOLIC BLOOD PRESSURE: 151 MMHG | DIASTOLIC BLOOD PRESSURE: 80 MMHG | RESPIRATION RATE: 23 BRPM | HEART RATE: 130 BPM

## 2022-11-08 VITALS — RESPIRATION RATE: 16 BRPM | OXYGEN SATURATION: 100 %

## 2022-11-08 VITALS
RESPIRATION RATE: 24 BRPM | TEMPERATURE: 98.2 F | DIASTOLIC BLOOD PRESSURE: 95 MMHG | SYSTOLIC BLOOD PRESSURE: 132 MMHG | HEART RATE: 123 BPM | OXYGEN SATURATION: 94 %

## 2022-11-08 VITALS
SYSTOLIC BLOOD PRESSURE: 106 MMHG | RESPIRATION RATE: 19 BRPM | DIASTOLIC BLOOD PRESSURE: 70 MMHG | HEART RATE: 105 BPM | OXYGEN SATURATION: 100 %

## 2022-11-08 VITALS
DIASTOLIC BLOOD PRESSURE: 79 MMHG | OXYGEN SATURATION: 97 % | HEART RATE: 129 BPM | SYSTOLIC BLOOD PRESSURE: 162 MMHG | RESPIRATION RATE: 18 BRPM

## 2022-11-08 VITALS — HEART RATE: 102 BPM

## 2022-11-08 VITALS
DIASTOLIC BLOOD PRESSURE: 73 MMHG | HEART RATE: 144 BPM | SYSTOLIC BLOOD PRESSURE: 131 MMHG | TEMPERATURE: 98.1 F | RESPIRATION RATE: 18 BRPM | OXYGEN SATURATION: 96 %

## 2022-11-08 VITALS
SYSTOLIC BLOOD PRESSURE: 149 MMHG | DIASTOLIC BLOOD PRESSURE: 76 MMHG | OXYGEN SATURATION: 98 % | HEART RATE: 132 BPM | TEMPERATURE: 98.42 F | RESPIRATION RATE: 20 BRPM

## 2022-11-08 VITALS
OXYGEN SATURATION: 98 % | SYSTOLIC BLOOD PRESSURE: 134 MMHG | RESPIRATION RATE: 16 BRPM | DIASTOLIC BLOOD PRESSURE: 96 MMHG | HEART RATE: 107 BPM

## 2022-11-08 VITALS
HEART RATE: 89 BPM | OXYGEN SATURATION: 98 % | DIASTOLIC BLOOD PRESSURE: 106 MMHG | TEMPERATURE: 98.8 F | SYSTOLIC BLOOD PRESSURE: 134 MMHG | RESPIRATION RATE: 22 BRPM

## 2022-11-08 VITALS
DIASTOLIC BLOOD PRESSURE: 72 MMHG | OXYGEN SATURATION: 100 % | HEART RATE: 84 BPM | SYSTOLIC BLOOD PRESSURE: 109 MMHG | RESPIRATION RATE: 16 BRPM

## 2022-11-08 VITALS
RESPIRATION RATE: 18 BRPM | HEART RATE: 125 BPM | SYSTOLIC BLOOD PRESSURE: 125 MMHG | DIASTOLIC BLOOD PRESSURE: 75 MMHG | OXYGEN SATURATION: 97 %

## 2022-11-08 VITALS
HEART RATE: 116 BPM | OXYGEN SATURATION: 100 % | DIASTOLIC BLOOD PRESSURE: 91 MMHG | SYSTOLIC BLOOD PRESSURE: 158 MMHG | TEMPERATURE: 98.42 F | RESPIRATION RATE: 24 BRPM

## 2022-11-08 VITALS
OXYGEN SATURATION: 96 % | SYSTOLIC BLOOD PRESSURE: 158 MMHG | HEART RATE: 127 BPM | RESPIRATION RATE: 20 BRPM | TEMPERATURE: 98.06 F | DIASTOLIC BLOOD PRESSURE: 125 MMHG

## 2022-11-08 VITALS
OXYGEN SATURATION: 96 % | DIASTOLIC BLOOD PRESSURE: 77 MMHG | RESPIRATION RATE: 23 BRPM | SYSTOLIC BLOOD PRESSURE: 143 MMHG | TEMPERATURE: 98.24 F | HEART RATE: 120 BPM

## 2022-11-08 VITALS
SYSTOLIC BLOOD PRESSURE: 108 MMHG | OXYGEN SATURATION: 100 % | DIASTOLIC BLOOD PRESSURE: 63 MMHG | TEMPERATURE: 98.5 F | RESPIRATION RATE: 16 BRPM | HEART RATE: 86 BPM

## 2022-11-08 VITALS
HEART RATE: 149 BPM | SYSTOLIC BLOOD PRESSURE: 93 MMHG | RESPIRATION RATE: 23 BRPM | DIASTOLIC BLOOD PRESSURE: 62 MMHG | OXYGEN SATURATION: 97 %

## 2022-11-08 VITALS — HEART RATE: 93 BPM

## 2022-11-08 VITALS — OXYGEN SATURATION: 100 % | RESPIRATION RATE: 18 BRPM

## 2022-11-08 VITALS — HEART RATE: 110 BPM

## 2022-11-08 LAB
ALBUMIN LEVEL: 2.7 G/DL (ref 3.5–5)
ALBUMIN/GLOB SERPL: 0.7 {RATIO} (ref 1.1–1.8)
ALP ISO SERPL-ACNC: 126 U/L (ref 38–126)
ALT SERPLBLD-CCNC: 38 U/L (ref 12–78)
ANION GAP SERPL CALC-SCNC: 8.7 MEQ/L (ref 5–15)
AST SERPL QL: 69 U/L (ref 17–59)
BILIRUBIN,TOTAL: 3.3 MG/DL (ref 0.2–1.3)
BUN SERPL-MCNC: 26 MG/DL (ref 9–20)
CALCIUM SPEC-MCNC: 8.4 MG/DL (ref 8.4–10.2)
CHLORIDE SPEC-SCNC: 107 MMOL/L (ref 98–107)
CO2 BLDCOA-SCNC: 28.1 MMHG (ref 23–27)
CO2 SERPL-SCNC: 28 MMOL/L (ref 22–30)
CREAT BLD-SCNC: 0.7 MG/DL (ref 0.66–1.25)
CREATININE CLEARANCE ESTIMATED: 124 ML/MIN (ref 50–200)
ESTIMATED GLOMERULAR FILT RATE: 120 ML/MIN (ref 60–?)
GFR (AFRICAN AMERICAN): 145 ML/MIN (ref 60–?)
GLOBULIN SER CALC-MCNC: 4.1 G/DL (ref 1.3–3.2)
GLUCOSE: 86 MG/DL (ref 74–100)
HCO3 BLDA-SCNC: 26.9 MMHG (ref 22–26)
HCT VFR BLD CALC: 43.3 % (ref 42–52)
HGB BLD-MCNC: 14.2 G/DL (ref 14.1–18)
LACTATE SERPL-MCNC: 2.5 MMOL/L (ref 0.7–2.1)
MAGNESIUM: 1.8 MG/DL (ref 1.6–2.3)
MCHC RBC-ENTMCNC: 32.5 G/DL (ref 31.8–35.4)
MCV RBC: 100.4 FL (ref 80–94)
MEAN CORPUSCULAR HEMOGLOBIN: 32.6 PG (ref 27–31.2)
PCO2 BLDA: 41.5 MMHG (ref 35–45)
PEEP RESPIRATORY: 5 CM[H2O]
PH BLDA: 7.43 MMOL/L (ref 7.35–7.45)
PLATELET # BLD: 89 K/MM3 (ref 142–424)
PO2 BLDA: 114.2 MMHG (ref 80–100)
POTASSIUM: 3.7 MMOL/L (ref 3.5–5.1)
PROT SERPL-MCNC: 6.8 G/DL (ref 6.3–8.2)
RBC # BLD AUTO: 4.32 M/MM3 (ref 4.6–6.2)
REFLEX LACTIC (2 HRS): (no result)
REFLEX LACTIC: (no result)
SODIUM SPEC-SCNC: 140 MMOL/L (ref 136–145)
SOURCE: (no result)
VANCOMYCIN TROUGH SERPL-MCNC: 23.2 UG/ML (ref 5–10)
WBC # BLD AUTO: 10.5 K/MM3 (ref 4.8–10.8)

## 2022-11-09 VITALS — OXYGEN SATURATION: 97 % | SYSTOLIC BLOOD PRESSURE: 119 MMHG | HEART RATE: 126 BPM | DIASTOLIC BLOOD PRESSURE: 81 MMHG

## 2022-11-09 VITALS — TEMPERATURE: 98.1 F

## 2022-11-09 VITALS
OXYGEN SATURATION: 95 % | HEART RATE: 118 BPM | RESPIRATION RATE: 17 BRPM | DIASTOLIC BLOOD PRESSURE: 70 MMHG | SYSTOLIC BLOOD PRESSURE: 146 MMHG

## 2022-11-09 VITALS
SYSTOLIC BLOOD PRESSURE: 148 MMHG | DIASTOLIC BLOOD PRESSURE: 75 MMHG | HEART RATE: 107 BPM | RESPIRATION RATE: 17 BRPM | OXYGEN SATURATION: 94 %

## 2022-11-09 VITALS — HEART RATE: 118 BPM

## 2022-11-09 VITALS
OXYGEN SATURATION: 99 % | SYSTOLIC BLOOD PRESSURE: 133 MMHG | DIASTOLIC BLOOD PRESSURE: 87 MMHG | RESPIRATION RATE: 18 BRPM | HEART RATE: 118 BPM

## 2022-11-09 VITALS — HEART RATE: 130 BPM | TEMPERATURE: 97.7 F

## 2022-11-09 VITALS — DIASTOLIC BLOOD PRESSURE: 91 MMHG | SYSTOLIC BLOOD PRESSURE: 118 MMHG | OXYGEN SATURATION: 98 % | HEART RATE: 140 BPM

## 2022-11-09 VITALS
OXYGEN SATURATION: 95 % | DIASTOLIC BLOOD PRESSURE: 88 MMHG | HEART RATE: 108 BPM | RESPIRATION RATE: 18 BRPM | SYSTOLIC BLOOD PRESSURE: 132 MMHG

## 2022-11-09 VITALS
OXYGEN SATURATION: 96 % | SYSTOLIC BLOOD PRESSURE: 129 MMHG | HEART RATE: 124 BPM | TEMPERATURE: 98.1 F | DIASTOLIC BLOOD PRESSURE: 92 MMHG | RESPIRATION RATE: 17 BRPM

## 2022-11-09 VITALS
RESPIRATION RATE: 18 BRPM | HEART RATE: 126 BPM | SYSTOLIC BLOOD PRESSURE: 101 MMHG | DIASTOLIC BLOOD PRESSURE: 66 MMHG | OXYGEN SATURATION: 96 %

## 2022-11-09 VITALS
OXYGEN SATURATION: 94 % | TEMPERATURE: 98.24 F | SYSTOLIC BLOOD PRESSURE: 109 MMHG | DIASTOLIC BLOOD PRESSURE: 57 MMHG | HEART RATE: 112 BPM | RESPIRATION RATE: 16 BRPM

## 2022-11-09 VITALS
RESPIRATION RATE: 19 BRPM | OXYGEN SATURATION: 95 % | DIASTOLIC BLOOD PRESSURE: 54 MMHG | SYSTOLIC BLOOD PRESSURE: 127 MMHG | HEART RATE: 132 BPM

## 2022-11-09 VITALS
RESPIRATION RATE: 22 BRPM | HEART RATE: 113 BPM | SYSTOLIC BLOOD PRESSURE: 158 MMHG | OXYGEN SATURATION: 90 % | DIASTOLIC BLOOD PRESSURE: 90 MMHG

## 2022-11-09 VITALS
HEART RATE: 132 BPM | SYSTOLIC BLOOD PRESSURE: 128 MMHG | DIASTOLIC BLOOD PRESSURE: 65 MMHG | TEMPERATURE: 98.3 F | OXYGEN SATURATION: 96 % | RESPIRATION RATE: 17 BRPM

## 2022-11-09 VITALS — HEART RATE: 143 BPM

## 2022-11-09 VITALS
SYSTOLIC BLOOD PRESSURE: 121 MMHG | RESPIRATION RATE: 23 BRPM | DIASTOLIC BLOOD PRESSURE: 74 MMHG | TEMPERATURE: 98.24 F | HEART RATE: 116 BPM | OXYGEN SATURATION: 91 %

## 2022-11-09 VITALS — TEMPERATURE: 98.2 F | OXYGEN SATURATION: 96 % | HEART RATE: 130 BPM

## 2022-11-09 VITALS — HEART RATE: 121 BPM

## 2022-11-09 VITALS — OXYGEN SATURATION: 96 % | HEART RATE: 115 BPM

## 2022-11-09 VITALS — OXYGEN SATURATION: 92 % | HEART RATE: 130 BPM

## 2022-11-09 VITALS — HEART RATE: 140 BPM

## 2022-11-09 VITALS — HEART RATE: 120 BPM

## 2022-11-09 LAB
ALBUMIN LEVEL: 2.2 G/DL (ref 3.5–5)
ALBUMIN/GLOB SERPL: 0.6 {RATIO} (ref 1.1–1.8)
ALP ISO SERPL-ACNC: 150 U/L (ref 38–126)
ALT SERPLBLD-CCNC: 31 U/L (ref 12–78)
AMMONIA: 30 UMOL/L (ref 9–30)
ANION GAP SERPL CALC-SCNC: 13.2 MEQ/L (ref 5–15)
AST SERPL QL: 66 U/L (ref 17–59)
BILIRUBIN,TOTAL: 4.2 MG/DL (ref 0.2–1.3)
BUN SERPL-MCNC: 30 MG/DL (ref 9–20)
CALCIUM SPEC-MCNC: 8.2 MG/DL (ref 8.4–10.2)
CHLORIDE SPEC-SCNC: 110 MMOL/L (ref 98–107)
CO2 SERPL-SCNC: 26 MMOL/L (ref 22–30)
CREAT BLD-SCNC: 0.8 MG/DL (ref 0.66–1.25)
CREATININE CLEARANCE ESTIMATED: 182 ML/MIN (ref 50–200)
ESTIMATED GLOMERULAR FILT RATE: 103 ML/MIN (ref 60–?)
GFR (AFRICAN AMERICAN): 124 ML/MIN (ref 60–?)
GLOBULIN SER CALC-MCNC: 3.7 G/DL (ref 1.3–3.2)
GLUCOSE: 89 MG/DL (ref 74–100)
HCT VFR BLD CALC: 44.3 % (ref 42–52)
HGB BLD-MCNC: 13.8 G/DL (ref 14.1–18)
LACTIC ACID FOLLOW UP (RFLX 2): 2.4 MMOL/L (ref 0.7–2.1)
MCHC RBC-ENTMCNC: 31.2 G/DL (ref 31.8–35.4)
MCV RBC: 102.8 FL (ref 80–94)
MEAN CORPUSCULAR HEMOGLOBIN: 32.1 PG (ref 27–31.2)
NT PRO BRAIN NATRIURETIC PEP.: 4470 PG/ML (ref 0–125)
PLATELET # BLD: 96 K/MM3 (ref 142–424)
POTASSIUM: 4.2 MMOL/L (ref 3.5–5.1)
PROT SERPL-MCNC: 5.9 G/DL (ref 6.3–8.2)
PTT HEPARIN (INPATIENT ONLY): 31.7 SECONDS (ref 23.6–34)
RBC # BLD AUTO: 4.31 M/MM3 (ref 4.6–6.2)
SODIUM SPEC-SCNC: 145 MMOL/L (ref 136–145)
TROPONIN I: 0.03 NG/ML (ref 0–0.03)
VANCOMYCIN PEAK SERPL-MCNC: 33.1 UG/ML (ref 11–39)
WBC # BLD AUTO: 12.2 K/MM3 (ref 4.8–10.8)

## 2022-11-10 VITALS
RESPIRATION RATE: 18 BRPM | SYSTOLIC BLOOD PRESSURE: 147 MMHG | DIASTOLIC BLOOD PRESSURE: 92 MMHG | HEART RATE: 84 BPM | OXYGEN SATURATION: 94 %

## 2022-11-10 VITALS — HEART RATE: 80 BPM

## 2022-11-10 VITALS
RESPIRATION RATE: 14 BRPM | SYSTOLIC BLOOD PRESSURE: 146 MMHG | HEART RATE: 78 BPM | TEMPERATURE: 98.5 F | OXYGEN SATURATION: 92 % | DIASTOLIC BLOOD PRESSURE: 87 MMHG

## 2022-11-10 VITALS
DIASTOLIC BLOOD PRESSURE: 84 MMHG | SYSTOLIC BLOOD PRESSURE: 154 MMHG | RESPIRATION RATE: 24 BRPM | TEMPERATURE: 98.24 F | OXYGEN SATURATION: 92 % | HEART RATE: 95 BPM

## 2022-11-10 VITALS
DIASTOLIC BLOOD PRESSURE: 82 MMHG | SYSTOLIC BLOOD PRESSURE: 144 MMHG | RESPIRATION RATE: 16 BRPM | TEMPERATURE: 98.2 F | OXYGEN SATURATION: 92 % | HEART RATE: 106 BPM

## 2022-11-10 VITALS
SYSTOLIC BLOOD PRESSURE: 139 MMHG | DIASTOLIC BLOOD PRESSURE: 59 MMHG | RESPIRATION RATE: 17 BRPM | OXYGEN SATURATION: 94 % | HEART RATE: 89 BPM

## 2022-11-10 VITALS
DIASTOLIC BLOOD PRESSURE: 61 MMHG | HEART RATE: 78 BPM | OXYGEN SATURATION: 95 % | RESPIRATION RATE: 16 BRPM | SYSTOLIC BLOOD PRESSURE: 130 MMHG

## 2022-11-10 VITALS
RESPIRATION RATE: 16 BRPM | DIASTOLIC BLOOD PRESSURE: 76 MMHG | TEMPERATURE: 97.6 F | HEART RATE: 92 BPM | OXYGEN SATURATION: 92 % | SYSTOLIC BLOOD PRESSURE: 138 MMHG

## 2022-11-10 VITALS — HEART RATE: 104 BPM

## 2022-11-10 VITALS — HEART RATE: 78 BPM | RESPIRATION RATE: 14 BRPM | DIASTOLIC BLOOD PRESSURE: 70 MMHG | SYSTOLIC BLOOD PRESSURE: 163 MMHG

## 2022-11-10 VITALS
SYSTOLIC BLOOD PRESSURE: 149 MMHG | OXYGEN SATURATION: 92 % | RESPIRATION RATE: 12 BRPM | DIASTOLIC BLOOD PRESSURE: 51 MMHG | HEART RATE: 70 BPM

## 2022-11-10 VITALS — HEART RATE: 70 BPM

## 2022-11-10 VITALS — HEART RATE: 81 BPM

## 2022-11-10 VITALS — TEMPERATURE: 97.9 F

## 2022-11-10 VITALS — HEART RATE: 99 BPM

## 2022-11-10 LAB
ALBUMIN LEVEL: 2.5 G/DL (ref 3.5–5)
ALBUMIN/GLOB SERPL: 0.6 {RATIO} (ref 1.1–1.8)
ALP ISO SERPL-ACNC: 136 U/L (ref 38–126)
ALT SERPLBLD-CCNC: 29 U/L (ref 12–78)
ANION GAP SERPL CALC-SCNC: 13.9 MEQ/L (ref 5–15)
AST SERPL QL: 49 U/L (ref 17–59)
BILIRUBIN,TOTAL: 3.4 MG/DL (ref 0.2–1.3)
BUN SERPL-MCNC: 33 MG/DL (ref 9–20)
CALCIUM SPEC-MCNC: 8.6 MG/DL (ref 8.4–10.2)
CHLORIDE SPEC-SCNC: 112 MMOL/L (ref 98–107)
CO2 SERPL-SCNC: 28 MMOL/L (ref 22–30)
CREAT BLD-SCNC: 0.8 MG/DL (ref 0.66–1.25)
CREATININE CLEARANCE ESTIMATED: 186 ML/MIN (ref 50–200)
ESTIMATED GLOMERULAR FILT RATE: 103 ML/MIN (ref 60–?)
GFR (AFRICAN AMERICAN): 124 ML/MIN (ref 60–?)
GLOBULIN SER CALC-MCNC: 3.9 G/DL (ref 1.3–3.2)
GLUCOSE: 109 MG/DL (ref 74–100)
HCT VFR BLD CALC: 42.6 % (ref 42–52)
HGB BLD-MCNC: 12.9 G/DL (ref 14.1–18)
MCHC RBC-ENTMCNC: 30.3 G/DL (ref 31.8–35.4)
MCV RBC: 104.1 FL (ref 80–94)
MEAN CORPUSCULAR HEMOGLOBIN: 31.5 PG (ref 27–31.2)
PLATELET # BLD: 89 K/MM3 (ref 142–424)
POTASSIUM: 3.9 MMOL/L (ref 3.5–5.1)
PROT SERPL-MCNC: 6.4 G/DL (ref 6.3–8.2)
RBC # BLD AUTO: 4.09 M/MM3 (ref 4.6–6.2)
SODIUM SPEC-SCNC: 150 MMOL/L (ref 136–145)
WBC # BLD AUTO: 7.2 K/MM3 (ref 4.8–10.8)

## 2022-11-11 VITALS
HEART RATE: 70 BPM | DIASTOLIC BLOOD PRESSURE: 88 MMHG | TEMPERATURE: 98.06 F | OXYGEN SATURATION: 92 % | RESPIRATION RATE: 22 BRPM | SYSTOLIC BLOOD PRESSURE: 149 MMHG

## 2022-11-11 VITALS
TEMPERATURE: 98.24 F | DIASTOLIC BLOOD PRESSURE: 88 MMHG | RESPIRATION RATE: 18 BRPM | SYSTOLIC BLOOD PRESSURE: 165 MMHG | OXYGEN SATURATION: 96 % | HEART RATE: 74 BPM

## 2022-11-11 VITALS
HEART RATE: 114 BPM | SYSTOLIC BLOOD PRESSURE: 146 MMHG | OXYGEN SATURATION: 99 % | RESPIRATION RATE: 22 BRPM | DIASTOLIC BLOOD PRESSURE: 78 MMHG | TEMPERATURE: 98.24 F

## 2022-11-11 VITALS
HEART RATE: 107 BPM | OXYGEN SATURATION: 94 % | TEMPERATURE: 97.52 F | RESPIRATION RATE: 20 BRPM | DIASTOLIC BLOOD PRESSURE: 82 MMHG | SYSTOLIC BLOOD PRESSURE: 151 MMHG

## 2022-11-11 VITALS
RESPIRATION RATE: 20 BRPM | TEMPERATURE: 98 F | OXYGEN SATURATION: 96 % | SYSTOLIC BLOOD PRESSURE: 162 MMHG | HEART RATE: 75 BPM | DIASTOLIC BLOOD PRESSURE: 94 MMHG

## 2022-11-11 VITALS — OXYGEN SATURATION: 95 % | HEART RATE: 104 BPM

## 2022-11-11 VITALS
DIASTOLIC BLOOD PRESSURE: 84 MMHG | OXYGEN SATURATION: 97 % | RESPIRATION RATE: 18 BRPM | HEART RATE: 74 BPM | TEMPERATURE: 98.24 F | SYSTOLIC BLOOD PRESSURE: 145 MMHG

## 2022-11-11 VITALS — HEART RATE: 88 BPM

## 2022-11-11 VITALS — HEART RATE: 99 BPM

## 2022-11-11 VITALS — HEART RATE: 101 BPM

## 2022-11-11 LAB
ALBUMIN LEVEL: 2.5 G/DL (ref 3.5–5)
ALBUMIN/GLOB SERPL: 0.6 {RATIO} (ref 1.1–1.8)
ALP ISO SERPL-ACNC: 136 U/L (ref 38–126)
ALT SERPLBLD-CCNC: 27 U/L (ref 12–78)
ANION GAP SERPL CALC-SCNC: 9.6 MEQ/L (ref 5–15)
AST SERPL QL: 51 U/L (ref 17–59)
BILIRUBIN,TOTAL: 3.7 MG/DL (ref 0.2–1.3)
BUN SERPL-MCNC: 26 MG/DL (ref 9–20)
CALCIUM SPEC-MCNC: 8.7 MG/DL (ref 8.4–10.2)
CHLORIDE SPEC-SCNC: 117 MMOL/L (ref 98–107)
CO2 SERPL-SCNC: 30 MMOL/L (ref 22–30)
CREAT BLD-SCNC: 0.6 MG/DL (ref 0.66–1.25)
CREATININE CLEARANCE ESTIMATED: 249 ML/MIN (ref 50–200)
ESTIMATED GLOMERULAR FILT RATE: 143 ML/MIN (ref 60–?)
GFR (AFRICAN AMERICAN): 173 ML/MIN (ref 60–?)
GLOBULIN SER CALC-MCNC: 4 G/DL (ref 1.3–3.2)
GLUCOSE: 94 MG/DL (ref 74–100)
HCT VFR BLD CALC: 38.2 % (ref 42–52)
HGB BLD-MCNC: 12 G/DL (ref 14.1–18)
LACTATE SERPL-MCNC: 2.6 MMOL/L (ref 0.7–2.1)
LACTIC ACID FOLLOW UP (RFLX 2): 1.8 MMOL/L (ref 0.7–2.1)
MCHC RBC-ENTMCNC: 31.3 G/DL (ref 31.8–35.4)
MCV RBC: 104.6 FL (ref 80–94)
MEAN CORPUSCULAR HEMOGLOBIN: 32.7 PG (ref 27–31.2)
PLATELET # BLD: 112 K/MM3 (ref 142–424)
POTASSIUM: 3.6 MMOL/L (ref 3.5–5.1)
PROT SERPL-MCNC: 6.5 G/DL (ref 6.3–8.2)
RBC # BLD AUTO: 3.65 M/MM3 (ref 4.6–6.2)
REFLEX LACTIC (2 HRS): (no result)
REFLEX LACTIC: (no result)
SODIUM SPEC-SCNC: 153 MMOL/L (ref 136–145)
VANCOMYCIN TROUGH SERPL-MCNC: 26.9 UG/ML (ref 5–10)
WBC # BLD AUTO: 4.5 K/MM3 (ref 4.8–10.8)

## 2022-11-12 VITALS
OXYGEN SATURATION: 94 % | DIASTOLIC BLOOD PRESSURE: 88 MMHG | SYSTOLIC BLOOD PRESSURE: 168 MMHG | TEMPERATURE: 98.1 F | HEART RATE: 75 BPM | RESPIRATION RATE: 18 BRPM

## 2022-11-12 VITALS
DIASTOLIC BLOOD PRESSURE: 74 MMHG | TEMPERATURE: 98.06 F | OXYGEN SATURATION: 98 % | RESPIRATION RATE: 18 BRPM | SYSTOLIC BLOOD PRESSURE: 131 MMHG | HEART RATE: 68 BPM

## 2022-11-12 VITALS
OXYGEN SATURATION: 99 % | HEART RATE: 80 BPM | TEMPERATURE: 98 F | RESPIRATION RATE: 18 BRPM | SYSTOLIC BLOOD PRESSURE: 162 MMHG | DIASTOLIC BLOOD PRESSURE: 93 MMHG

## 2022-11-12 VITALS
DIASTOLIC BLOOD PRESSURE: 72 MMHG | HEART RATE: 74 BPM | TEMPERATURE: 97.7 F | SYSTOLIC BLOOD PRESSURE: 137 MMHG | OXYGEN SATURATION: 97 % | RESPIRATION RATE: 18 BRPM

## 2022-11-12 VITALS — HEART RATE: 73 BPM | OXYGEN SATURATION: 91 %

## 2022-11-12 VITALS
RESPIRATION RATE: 16 BRPM | HEART RATE: 74 BPM | DIASTOLIC BLOOD PRESSURE: 92 MMHG | OXYGEN SATURATION: 100 % | TEMPERATURE: 98.1 F | SYSTOLIC BLOOD PRESSURE: 165 MMHG

## 2022-11-12 VITALS — HEART RATE: 73 BPM | OXYGEN SATURATION: 98 %

## 2022-11-12 VITALS
RESPIRATION RATE: 20 BRPM | TEMPERATURE: 98.3 F | SYSTOLIC BLOOD PRESSURE: 147 MMHG | DIASTOLIC BLOOD PRESSURE: 90 MMHG | OXYGEN SATURATION: 100 % | HEART RATE: 70 BPM

## 2022-11-12 VITALS — OXYGEN SATURATION: 100 % | HEART RATE: 70 BPM

## 2022-11-12 LAB
ALBUMIN LEVEL: 2.9 G/DL (ref 3.5–5)
ALBUMIN/GLOB SERPL: 0.7 {RATIO} (ref 1.1–1.8)
ALP ISO SERPL-ACNC: 149 U/L (ref 38–126)
ALT SERPLBLD-CCNC: 43 U/L (ref 12–78)
AMMONIA: 26 UMOL/L (ref 9–30)
ANION GAP SERPL CALC-SCNC: 11.3 MEQ/L (ref 5–15)
AST SERPL QL: 75 U/L (ref 17–59)
BILIRUBIN,TOTAL: 3.6 MG/DL (ref 0.2–1.3)
BUN SERPL-MCNC: 21 MG/DL (ref 9–20)
CALCIUM SPEC-MCNC: 9 MG/DL (ref 8.4–10.2)
CHLORIDE SPEC-SCNC: 112 MMOL/L (ref 98–107)
CK SERPL-CCNC: 89 U/L (ref 55–170)
CO2 SERPL-SCNC: 31 MMOL/L (ref 22–30)
CREAT BLD-SCNC: 0.7 MG/DL (ref 0.66–1.25)
CREATININE CLEARANCE ESTIMATED: 213 ML/MIN (ref 50–200)
ESTIMATED GLOMERULAR FILT RATE: 120 ML/MIN (ref 60–?)
GFR (AFRICAN AMERICAN): 145 ML/MIN (ref 60–?)
GLOBULIN SER CALC-MCNC: 4.4 G/DL (ref 1.3–3.2)
GLUCOSE: 118 MG/DL (ref 74–100)
HCT VFR BLD CALC: 39.4 % (ref 42–52)
HGB BLD-MCNC: 12.3 G/DL (ref 14.1–18)
MCHC RBC-ENTMCNC: 31.2 G/DL (ref 31.8–35.4)
MCV RBC: 103.7 FL (ref 80–94)
MEAN CORPUSCULAR HEMOGLOBIN: 32.4 PG (ref 27–31.2)
PLATELET # BLD: 119 K/MM3 (ref 142–424)
POTASSIUM: 3.3 MMOL/L (ref 3.5–5.1)
PROT SERPL-MCNC: 7.3 G/DL (ref 6.3–8.2)
RBC # BLD AUTO: 3.8 M/MM3 (ref 4.6–6.2)
SODIUM SPEC-SCNC: 151 MMOL/L (ref 136–145)
VANCOMYCIN PEAK SERPL-MCNC: 22.9 UG/ML (ref 11–39)
WBC # BLD AUTO: 4.2 K/MM3 (ref 4.8–10.8)

## 2022-11-19 ENCOUNTER — HOSPITAL ENCOUNTER (EMERGENCY)
Age: 47
Discharge: HOME | End: 2022-11-19
Payer: MEDICARE

## 2022-11-19 VITALS
HEART RATE: 69 BPM | SYSTOLIC BLOOD PRESSURE: 111 MMHG | TEMPERATURE: 97.8 F | OXYGEN SATURATION: 97 % | RESPIRATION RATE: 16 BRPM | DIASTOLIC BLOOD PRESSURE: 68 MMHG

## 2022-11-19 VITALS — OXYGEN SATURATION: 97 % | HEART RATE: 72 BPM | SYSTOLIC BLOOD PRESSURE: 138 MMHG | DIASTOLIC BLOOD PRESSURE: 83 MMHG

## 2022-11-19 VITALS
BODY MASS INDEX: 39.9 KG/M2 | HEART RATE: 77 BPM | RESPIRATION RATE: 18 BRPM | DIASTOLIC BLOOD PRESSURE: 76 MMHG | OXYGEN SATURATION: 98 % | SYSTOLIC BLOOD PRESSURE: 118 MMHG | TEMPERATURE: 97.88 F

## 2022-11-19 VITALS — BODY MASS INDEX: 41.3 KG/M2

## 2022-11-19 VITALS
HEART RATE: 76 BPM | SYSTOLIC BLOOD PRESSURE: 149 MMHG | RESPIRATION RATE: 20 BRPM | DIASTOLIC BLOOD PRESSURE: 102 MMHG | OXYGEN SATURATION: 98 % | TEMPERATURE: 98 F

## 2022-11-19 VITALS
DIASTOLIC BLOOD PRESSURE: 88 MMHG | TEMPERATURE: 98 F | HEART RATE: 72 BPM | SYSTOLIC BLOOD PRESSURE: 175 MMHG | OXYGEN SATURATION: 99 % | RESPIRATION RATE: 18 BRPM

## 2022-11-19 VITALS — DIASTOLIC BLOOD PRESSURE: 100 MMHG | OXYGEN SATURATION: 98 % | HEART RATE: 70 BPM | SYSTOLIC BLOOD PRESSURE: 188 MMHG

## 2022-11-19 DIAGNOSIS — Z88.1: ICD-10-CM

## 2022-11-19 DIAGNOSIS — Z88.8: ICD-10-CM

## 2022-11-19 DIAGNOSIS — K74.60: Primary | ICD-10-CM

## 2022-11-19 DIAGNOSIS — I10: ICD-10-CM

## 2022-11-19 DIAGNOSIS — J44.9: ICD-10-CM

## 2022-11-19 DIAGNOSIS — K74.60: ICD-10-CM

## 2022-11-19 DIAGNOSIS — S20.212A: ICD-10-CM

## 2022-11-19 DIAGNOSIS — Z88.6: ICD-10-CM
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
## 2022-11-19 DIAGNOSIS — G89.29: ICD-10-CM

## 2022-11-19 DIAGNOSIS — J18.9: Primary | ICD-10-CM
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
## 2022-11-19 DIAGNOSIS — J44.1: ICD-10-CM

## 2022-11-19 DIAGNOSIS — Z87.891: ICD-10-CM

## 2022-11-19 LAB
ALBUMIN LEVEL: 2.8 G/DL (ref 3.5–5)
ALBUMIN/GLOB SERPL: 0.5 {RATIO} (ref 1.1–1.8)
ALP ISO SERPL-ACNC: 268 U/L (ref 38–126)
ALT SERPLBLD-CCNC: 60 U/L (ref 12–78)
ANION GAP SERPL CALC-SCNC: 8.2 MEQ/L (ref 5–15)
AST SERPL QL: 120 U/L (ref 17–59)
BILIRUBIN,TOTAL: 2.6 MG/DL (ref 0.2–1.3)
BUN SERPL-MCNC: 13 MG/DL (ref 9–20)
CALCIUM SPEC-MCNC: 8.5 MG/DL (ref 8.4–10.2)
CHLORIDE SPEC-SCNC: 104 MMOL/L (ref 98–107)
CO2 SERPL-SCNC: 34 MMOL/L (ref 22–30)
CREAT BLD-SCNC: 0.6 MG/DL (ref 0.66–1.25)
CREATININE CLEARANCE ESTIMATED: 258 ML/MIN (ref 50–200)
ESTIMATED GLOMERULAR FILT RATE: 143 ML/MIN (ref 60–?)
GFR (AFRICAN AMERICAN): 173 ML/MIN (ref 60–?)
GLOBULIN SER CALC-MCNC: 5.3 G/DL (ref 1.3–3.2)
GLUCOSE: 103 MG/DL (ref 74–100)
HCT VFR BLD CALC: 35.5 % (ref 42–52)
HGB BLD-MCNC: 11.2 G/DL (ref 14.1–18)
MCHC RBC-ENTMCNC: 31.5 G/DL (ref 31.8–35.4)
MCV RBC: 102.5 FL (ref 80–94)
MEAN CORPUSCULAR HEMOGLOBIN: 32.3 PG (ref 27–31.2)
PLATELET # BLD: 136 K/MM3 (ref 142–424)
POTASSIUM: 4.2 MMOL/L (ref 3.5–5.1)
PROT SERPL-MCNC: 8.1 G/DL (ref 6.3–8.2)
RBC # BLD AUTO: 3.46 M/MM3 (ref 4.6–6.2)
SODIUM SPEC-SCNC: 142 MMOL/L (ref 136–145)
WBC # BLD AUTO: 4.1 K/MM3 (ref 4.8–10.8)

## 2022-11-19 PROCEDURE — 72170 X-RAY EXAM OF PELVIS: CPT

## 2022-11-19 PROCEDURE — 71046 X-RAY EXAM CHEST 2 VIEWS: CPT

## 2022-11-19 PROCEDURE — 99285 EMERGENCY DEPT VISIT HI MDM: CPT

## 2022-11-19 PROCEDURE — 93005 ELECTROCARDIOGRAM TRACING: CPT

## 2022-11-19 PROCEDURE — 80053 COMPREHEN METABOLIC PANEL: CPT

## 2022-11-19 PROCEDURE — 71250 CT THORAX DX C-: CPT

## 2022-11-19 PROCEDURE — 74177 CT ABD & PELVIS W/CONTRAST: CPT

## 2022-11-19 PROCEDURE — 85025 COMPLETE CBC W/AUTO DIFF WBC: CPT

## 2022-11-25 ENCOUNTER — HOSPITAL ENCOUNTER (INPATIENT)
Dept: HOSPITAL 22 - ER | Age: 47
LOS: 5 days | Discharge: HOME | DRG: 981 | End: 2022-11-30
Payer: MEDICARE

## 2022-11-25 VITALS
TEMPERATURE: 98.4 F | DIASTOLIC BLOOD PRESSURE: 78 MMHG | OXYGEN SATURATION: 97 % | SYSTOLIC BLOOD PRESSURE: 134 MMHG | HEART RATE: 67 BPM | RESPIRATION RATE: 18 BRPM

## 2022-11-25 VITALS
BODY MASS INDEX: 41.8 KG/M2 | BODY MASS INDEX: 43.2 KG/M2 | BODY MASS INDEX: 39.9 KG/M2 | BODY MASS INDEX: 43.7 KG/M2 | BODY MASS INDEX: 41.1 KG/M2 | BODY MASS INDEX: 41.3 KG/M2

## 2022-11-25 VITALS
SYSTOLIC BLOOD PRESSURE: 123 MMHG | OXYGEN SATURATION: 96 % | DIASTOLIC BLOOD PRESSURE: 72 MMHG | RESPIRATION RATE: 18 BRPM | HEART RATE: 65 BPM

## 2022-11-25 VITALS
OXYGEN SATURATION: 93 % | RESPIRATION RATE: 18 BRPM | SYSTOLIC BLOOD PRESSURE: 115 MMHG | HEART RATE: 70 BPM | DIASTOLIC BLOOD PRESSURE: 65 MMHG

## 2022-11-25 VITALS
RESPIRATION RATE: 24 BRPM | SYSTOLIC BLOOD PRESSURE: 155 MMHG | OXYGEN SATURATION: 92 % | HEART RATE: 90 BPM | TEMPERATURE: 98.4 F | DIASTOLIC BLOOD PRESSURE: 115 MMHG

## 2022-11-25 VITALS
DIASTOLIC BLOOD PRESSURE: 68 MMHG | OXYGEN SATURATION: 94 % | RESPIRATION RATE: 18 BRPM | SYSTOLIC BLOOD PRESSURE: 137 MMHG | HEART RATE: 65 BPM

## 2022-11-25 VITALS
RESPIRATION RATE: 18 BRPM | SYSTOLIC BLOOD PRESSURE: 157 MMHG | DIASTOLIC BLOOD PRESSURE: 87 MMHG | HEART RATE: 81 BPM | TEMPERATURE: 98 F | OXYGEN SATURATION: 92 %

## 2022-11-25 VITALS — DIASTOLIC BLOOD PRESSURE: 69 MMHG | HEART RATE: 78 BPM | OXYGEN SATURATION: 97 % | SYSTOLIC BLOOD PRESSURE: 127 MMHG

## 2022-11-25 VITALS
SYSTOLIC BLOOD PRESSURE: 140 MMHG | HEART RATE: 69 BPM | OXYGEN SATURATION: 97 % | RESPIRATION RATE: 18 BRPM | DIASTOLIC BLOOD PRESSURE: 81 MMHG

## 2022-11-25 DIAGNOSIS — Z87.891: ICD-10-CM

## 2022-11-25 DIAGNOSIS — J44.1: ICD-10-CM
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
## 2022-11-25 DIAGNOSIS — D53.9: ICD-10-CM

## 2022-11-25 DIAGNOSIS — Z91.199: ICD-10-CM

## 2022-11-25 DIAGNOSIS — F11.10: ICD-10-CM

## 2022-11-25 DIAGNOSIS — I50.22: ICD-10-CM

## 2022-11-25 DIAGNOSIS — I08.1: ICD-10-CM

## 2022-11-25 DIAGNOSIS — L97.219: ICD-10-CM

## 2022-11-25 DIAGNOSIS — F19.10: ICD-10-CM

## 2022-11-25 DIAGNOSIS — I11.0: ICD-10-CM

## 2022-11-25 DIAGNOSIS — I48.91: ICD-10-CM

## 2022-11-25 DIAGNOSIS — J44.0: ICD-10-CM

## 2022-11-25 DIAGNOSIS — Z86.73: ICD-10-CM

## 2022-11-25 DIAGNOSIS — F15.10: ICD-10-CM

## 2022-11-25 DIAGNOSIS — I87.2: Primary | ICD-10-CM

## 2022-11-25 DIAGNOSIS — L03.115: ICD-10-CM

## 2022-11-25 DIAGNOSIS — K74.60: ICD-10-CM

## 2022-11-25 DIAGNOSIS — L03.116: ICD-10-CM

## 2022-11-25 DIAGNOSIS — L97.229: ICD-10-CM

## 2022-11-25 DIAGNOSIS — J18.9: ICD-10-CM

## 2022-11-25 LAB
ALBUMIN LEVEL: 3.1 G/DL (ref 3.5–5)
ALBUMIN/GLOB SERPL: 0.5 {RATIO} (ref 1.1–1.8)
ALP ISO SERPL-ACNC: 289 U/L (ref 38–126)
ALT SERPLBLD-CCNC: 51 U/L (ref 12–78)
ANION GAP SERPL CALC-SCNC: 15.5 MEQ/L (ref 5–15)
AST SERPL QL: 74 U/L (ref 17–59)
BILIRUBIN,TOTAL: 3.9 MG/DL (ref 0.2–1.3)
BUN SERPL-MCNC: 12 MG/DL (ref 9–20)
CALCIUM SPEC-MCNC: 8.1 MG/DL (ref 8.4–10.2)
CHLORIDE SPEC-SCNC: 100 MMOL/L (ref 98–107)
CO2 SERPL-SCNC: 29 MMOL/L (ref 22–30)
COLOR UR: (no result)
CREAT BLD-SCNC: 0.7 MG/DL (ref 0.66–1.25)
D-DIMER: 2.3 UG/ML (ref 0–0.5)
ESTIMATED GLOMERULAR FILT RATE: 120 ML/MIN (ref 60–?)
GFR (AFRICAN AMERICAN): 145 ML/MIN (ref 60–?)
GLOBULIN SER CALC-MCNC: 5.7 G/DL (ref 1.3–3.2)
GLUCOSE: 98 MG/DL (ref 74–100)
HCT VFR BLD CALC: 35.6 % (ref 42–52)
HGB BLD-MCNC: 11 G/DL (ref 14.1–18)
LIPASE: 61 U/L (ref 23–300)
MCHC RBC-ENTMCNC: 31 G/DL (ref 31.8–35.4)
MCV RBC: 104.1 FL (ref 80–94)
MEAN CORPUSCULAR HEMOGLOBIN: 32.3 PG (ref 27–31.2)
MICRO URNS: (no result)
NT PRO BRAIN NATRIURETIC PEP.: 1630 PG/ML (ref 0–125)
PH UR: 6 [PH] (ref 5–8.5)
PLATELET # BLD: 178 K/MM3 (ref 142–424)
POTASSIUM: 3.5 MMOL/L (ref 3.5–5.1)
PROT SERPL-MCNC: 8.8 G/DL (ref 6.3–8.2)
PROT UR STRIP-MCNC: (no result) MG/DL
RBC # BLD AUTO: 3.42 M/MM3 (ref 4.6–6.2)
RBC #/AREA URNS HPF: (no result) #/HPF (ref 0–3)
SODIUM SPEC-SCNC: 141 MMOL/L (ref 136–145)
SP GR UR: 1.02 (ref 1–1.03)
TROPONIN I: 0.02 NG/ML (ref 0–0.03)
UROBILINOGEN UR QL: 1 EU/DL
WBC # BLD AUTO: 6.5 K/MM3 (ref 4.8–10.8)
WBC # UR: (no result) #/HPF (ref 0–3)

## 2022-11-25 PROCEDURE — 83735 ASSAY OF MAGNESIUM: CPT

## 2022-11-25 PROCEDURE — 36415 COLL VENOUS BLD VENIPUNCTURE: CPT

## 2022-11-25 PROCEDURE — 87040 BLOOD CULTURE FOR BACTERIA: CPT

## 2022-11-25 PROCEDURE — 85025 COMPLETE CBC W/AUTO DIFF WBC: CPT

## 2022-11-25 PROCEDURE — 99285 EMERGENCY DEPT VISIT HI MDM: CPT

## 2022-11-25 PROCEDURE — 11042 DBRDMT SUBQ TIS 1ST 20SQCM/<: CPT

## 2022-11-25 PROCEDURE — 87081 CULTURE SCREEN ONLY: CPT

## 2022-11-25 PROCEDURE — 85378 FIBRIN DEGRADE SEMIQUANT: CPT

## 2022-11-25 PROCEDURE — 82140 ASSAY OF AMMONIA: CPT

## 2022-11-25 PROCEDURE — 80048 BASIC METABOLIC PNL TOTAL CA: CPT

## 2022-11-25 PROCEDURE — 94761 N-INVAS EAR/PLS OXIMETRY MLT: CPT

## 2022-11-25 PROCEDURE — 71045 X-RAY EXAM CHEST 1 VIEW: CPT

## 2022-11-25 PROCEDURE — 74176 CT ABD & PELVIS W/O CONTRAST: CPT

## 2022-11-25 PROCEDURE — 87075 CULTR BACTERIA EXCEPT BLOOD: CPT

## 2022-11-25 PROCEDURE — 83690 ASSAY OF LIPASE: CPT

## 2022-11-25 PROCEDURE — 80305 DRUG TEST PRSMV DIR OPT OBS: CPT

## 2022-11-25 PROCEDURE — 85651 RBC SED RATE NONAUTOMATED: CPT

## 2022-11-25 PROCEDURE — 84484 ASSAY OF TROPONIN QUANT: CPT

## 2022-11-25 PROCEDURE — 93005 ELECTROCARDIOGRAM TRACING: CPT

## 2022-11-25 PROCEDURE — C9803 HOPD COVID-19 SPEC COLLECT: HCPCS

## 2022-11-25 PROCEDURE — 87186 SC STD MICRODIL/AGAR DIL: CPT

## 2022-11-25 PROCEDURE — 87070 CULTURE OTHR SPECIMN AEROBIC: CPT

## 2022-11-25 PROCEDURE — 97161 PT EVAL LOW COMPLEX 20 MIN: CPT

## 2022-11-25 PROCEDURE — 80053 COMPREHEN METABOLIC PANEL: CPT

## 2022-11-25 PROCEDURE — 83880 ASSAY OF NATRIURETIC PEPTIDE: CPT

## 2022-11-25 PROCEDURE — 80202 ASSAY OF VANCOMYCIN: CPT

## 2022-11-25 PROCEDURE — 94640 AIRWAY INHALATION TREATMENT: CPT

## 2022-11-25 PROCEDURE — 73718 MRI LOWER EXTREMITY W/O DYE: CPT

## 2022-11-25 PROCEDURE — 87077 CULTURE AEROBIC IDENTIFY: CPT

## 2022-11-25 PROCEDURE — U0003 INFECTIOUS AGENT DETECTION BY NUCLEIC ACID (DNA OR RNA); SEVERE ACUTE RESPIRATORY SYNDROME CORONAVIRUS 2 (SARS-COV-2) (CORONAVIRUS DISEASE [COVID-19]), AMPLIFIED PROBE TECHNIQUE, MAKING USE OF HIGH THROUGHPUT TECHNOLOGIES AS DESCRIBED BY CMS-2020-01-R: HCPCS

## 2022-11-25 PROCEDURE — 87205 SMEAR GRAM STAIN: CPT

## 2022-11-25 PROCEDURE — 84100 ASSAY OF PHOSPHORUS: CPT

## 2022-11-25 PROCEDURE — 73590 X-RAY EXAM OF LOWER LEG: CPT

## 2022-11-25 PROCEDURE — 71275 CT ANGIOGRAPHY CHEST: CPT

## 2022-11-25 PROCEDURE — 81001 URINALYSIS AUTO W/SCOPE: CPT

## 2022-11-25 PROCEDURE — U0005 INFEC AGEN DETEC AMPLI PROBE: HCPCS

## 2022-11-26 VITALS
TEMPERATURE: 97.4 F | OXYGEN SATURATION: 95 % | DIASTOLIC BLOOD PRESSURE: 91 MMHG | RESPIRATION RATE: 18 BRPM | HEART RATE: 72 BPM | SYSTOLIC BLOOD PRESSURE: 164 MMHG

## 2022-11-26 VITALS
TEMPERATURE: 98 F | HEART RATE: 72 BPM | RESPIRATION RATE: 18 BRPM | DIASTOLIC BLOOD PRESSURE: 71 MMHG | OXYGEN SATURATION: 98 % | SYSTOLIC BLOOD PRESSURE: 146 MMHG

## 2022-11-26 VITALS
DIASTOLIC BLOOD PRESSURE: 92 MMHG | SYSTOLIC BLOOD PRESSURE: 150 MMHG | TEMPERATURE: 97.8 F | RESPIRATION RATE: 18 BRPM | OXYGEN SATURATION: 97 % | HEART RATE: 60 BPM

## 2022-11-26 VITALS
OXYGEN SATURATION: 93 % | TEMPERATURE: 97.8 F | HEART RATE: 76 BPM | RESPIRATION RATE: 18 BRPM | DIASTOLIC BLOOD PRESSURE: 78 MMHG | SYSTOLIC BLOOD PRESSURE: 142 MMHG

## 2022-11-26 VITALS
DIASTOLIC BLOOD PRESSURE: 64 MMHG | SYSTOLIC BLOOD PRESSURE: 120 MMHG | TEMPERATURE: 98.3 F | RESPIRATION RATE: 20 BRPM | HEART RATE: 83 BPM | OXYGEN SATURATION: 95 %

## 2022-11-26 VITALS
DIASTOLIC BLOOD PRESSURE: 95 MMHG | SYSTOLIC BLOOD PRESSURE: 155 MMHG | OXYGEN SATURATION: 93 % | HEART RATE: 60 BPM | TEMPERATURE: 98.2 F | RESPIRATION RATE: 18 BRPM

## 2022-11-26 VITALS — OXYGEN SATURATION: 91 % | HEART RATE: 60 BPM

## 2022-11-26 VITALS — HEART RATE: 63 BPM

## 2022-11-26 LAB
AMMONIA: 62 UMOL/L (ref 9–30)
ANION GAP SERPL CALC-SCNC: 12.4 MEQ/L (ref 5–15)
BUN SERPL-MCNC: 13 MG/DL (ref 9–20)
CALCIUM SPEC-MCNC: 7.5 MG/DL (ref 8.4–10.2)
CHLORIDE SPEC-SCNC: 99 MMOL/L (ref 98–107)
CO2 SERPL-SCNC: 30 MMOL/L (ref 22–30)
CREAT BLD-SCNC: 0.5 MG/DL (ref 0.66–1.25)
CREATININE CLEARANCE ESTIMATED: 173 ML/MIN (ref 50–200)
ESTIMATED GLOMERULAR FILT RATE: 177 ML/MIN (ref 60–?)
GFR (AFRICAN AMERICAN): 214 ML/MIN (ref 60–?)
GLUCOSE: 160 MG/DL (ref 74–100)
HCT VFR BLD CALC: 30.6 % (ref 42–52)
HGB BLD-MCNC: 9.7 G/DL (ref 14.1–18)
MAGNESIUM: 1.3 MG/DL (ref 1.6–2.3)
MCHC RBC-ENTMCNC: 31.6 G/DL (ref 31.8–35.4)
MCV RBC: 103 FL (ref 80–94)
MEAN CORPUSCULAR HEMOGLOBIN: 32.6 PG (ref 27–31.2)
PLATELET # BLD: 104 K/MM3 (ref 142–424)
POTASSIUM: 3.4 MMOL/L (ref 3.5–5.1)
RBC # BLD AUTO: 2.97 M/MM3 (ref 4.6–6.2)
SODIUM SPEC-SCNC: 138 MMOL/L (ref 136–145)
WBC # BLD AUTO: 1.7 K/MM3 (ref 4.8–10.8)

## 2022-11-27 VITALS
TEMPERATURE: 98.6 F | SYSTOLIC BLOOD PRESSURE: 151 MMHG | RESPIRATION RATE: 18 BRPM | OXYGEN SATURATION: 96 % | HEART RATE: 88 BPM | DIASTOLIC BLOOD PRESSURE: 83 MMHG

## 2022-11-27 VITALS
TEMPERATURE: 98.7 F | SYSTOLIC BLOOD PRESSURE: 151 MMHG | RESPIRATION RATE: 18 BRPM | HEART RATE: 86 BPM | OXYGEN SATURATION: 98 % | DIASTOLIC BLOOD PRESSURE: 88 MMHG

## 2022-11-27 VITALS
OXYGEN SATURATION: 94 % | SYSTOLIC BLOOD PRESSURE: 138 MMHG | HEART RATE: 67 BPM | TEMPERATURE: 98.06 F | RESPIRATION RATE: 10 BRPM | DIASTOLIC BLOOD PRESSURE: 77 MMHG

## 2022-11-27 VITALS
DIASTOLIC BLOOD PRESSURE: 84 MMHG | RESPIRATION RATE: 18 BRPM | TEMPERATURE: 97.8 F | HEART RATE: 68 BPM | SYSTOLIC BLOOD PRESSURE: 141 MMHG | OXYGEN SATURATION: 96 %

## 2022-11-27 VITALS
DIASTOLIC BLOOD PRESSURE: 84 MMHG | RESPIRATION RATE: 18 BRPM | TEMPERATURE: 98.8 F | OXYGEN SATURATION: 96 % | HEART RATE: 73 BPM | SYSTOLIC BLOOD PRESSURE: 155 MMHG

## 2022-11-27 VITALS
RESPIRATION RATE: 18 BRPM | DIASTOLIC BLOOD PRESSURE: 84 MMHG | SYSTOLIC BLOOD PRESSURE: 147 MMHG | OXYGEN SATURATION: 96 % | HEART RATE: 69 BPM | TEMPERATURE: 97.7 F

## 2022-11-27 VITALS
DIASTOLIC BLOOD PRESSURE: 77 MMHG | HEART RATE: 67 BPM | OXYGEN SATURATION: 94 % | SYSTOLIC BLOOD PRESSURE: 138 MMHG | RESPIRATION RATE: 10 BRPM | TEMPERATURE: 98.1 F

## 2022-11-27 VITALS
RESPIRATION RATE: 20 BRPM | DIASTOLIC BLOOD PRESSURE: 85 MMHG | OXYGEN SATURATION: 96 % | TEMPERATURE: 98.78 F | SYSTOLIC BLOOD PRESSURE: 158 MMHG | HEART RATE: 83 BPM

## 2022-11-27 VITALS
RESPIRATION RATE: 20 BRPM | HEART RATE: 84 BPM | DIASTOLIC BLOOD PRESSURE: 80 MMHG | OXYGEN SATURATION: 95 % | SYSTOLIC BLOOD PRESSURE: 150 MMHG | TEMPERATURE: 98.06 F

## 2022-11-27 VITALS
RESPIRATION RATE: 18 BRPM | HEART RATE: 88 BPM | DIASTOLIC BLOOD PRESSURE: 89 MMHG | OXYGEN SATURATION: 97 % | SYSTOLIC BLOOD PRESSURE: 157 MMHG | TEMPERATURE: 98.4 F

## 2022-11-27 VITALS
TEMPERATURE: 98.24 F | HEART RATE: 81 BPM | RESPIRATION RATE: 20 BRPM | DIASTOLIC BLOOD PRESSURE: 73 MMHG | OXYGEN SATURATION: 98 % | SYSTOLIC BLOOD PRESSURE: 148 MMHG

## 2022-11-27 VITALS
OXYGEN SATURATION: 94 % | HEART RATE: 64 BPM | DIASTOLIC BLOOD PRESSURE: 80 MMHG | SYSTOLIC BLOOD PRESSURE: 149 MMHG | RESPIRATION RATE: 12 BRPM

## 2022-11-27 VITALS
OXYGEN SATURATION: 95 % | TEMPERATURE: 98.24 F | DIASTOLIC BLOOD PRESSURE: 87 MMHG | SYSTOLIC BLOOD PRESSURE: 149 MMHG | HEART RATE: 74 BPM | RESPIRATION RATE: 18 BRPM

## 2022-11-27 VITALS
HEART RATE: 84 BPM | SYSTOLIC BLOOD PRESSURE: 157 MMHG | DIASTOLIC BLOOD PRESSURE: 94 MMHG | TEMPERATURE: 98.78 F | RESPIRATION RATE: 18 BRPM | OXYGEN SATURATION: 100 %

## 2022-11-27 VITALS
OXYGEN SATURATION: 96 % | HEART RATE: 71 BPM | SYSTOLIC BLOOD PRESSURE: 148 MMHG | TEMPERATURE: 98.3 F | DIASTOLIC BLOOD PRESSURE: 89 MMHG | RESPIRATION RATE: 20 BRPM

## 2022-11-27 VITALS
SYSTOLIC BLOOD PRESSURE: 145 MMHG | DIASTOLIC BLOOD PRESSURE: 87 MMHG | OXYGEN SATURATION: 97 % | RESPIRATION RATE: 18 BRPM | HEART RATE: 73 BPM | TEMPERATURE: 97.52 F

## 2022-11-27 VITALS
DIASTOLIC BLOOD PRESSURE: 88 MMHG | TEMPERATURE: 97.7 F | OXYGEN SATURATION: 97 % | HEART RATE: 70 BPM | SYSTOLIC BLOOD PRESSURE: 149 MMHG | RESPIRATION RATE: 18 BRPM

## 2022-11-27 VITALS
RESPIRATION RATE: 18 BRPM | HEART RATE: 69 BPM | OXYGEN SATURATION: 96 % | DIASTOLIC BLOOD PRESSURE: 75 MMHG | SYSTOLIC BLOOD PRESSURE: 152 MMHG

## 2022-11-27 VITALS
OXYGEN SATURATION: 96 % | DIASTOLIC BLOOD PRESSURE: 84 MMHG | TEMPERATURE: 98 F | HEART RATE: 69 BPM | RESPIRATION RATE: 18 BRPM | SYSTOLIC BLOOD PRESSURE: 147 MMHG

## 2022-11-27 VITALS
HEART RATE: 63 BPM | TEMPERATURE: 97.8 F | RESPIRATION RATE: 18 BRPM | OXYGEN SATURATION: 97 % | SYSTOLIC BLOOD PRESSURE: 145 MMHG | DIASTOLIC BLOOD PRESSURE: 87 MMHG

## 2022-11-27 VITALS
RESPIRATION RATE: 14 BRPM | TEMPERATURE: 97.7 F | SYSTOLIC BLOOD PRESSURE: 152 MMHG | HEART RATE: 61 BPM | OXYGEN SATURATION: 99 % | DIASTOLIC BLOOD PRESSURE: 87 MMHG

## 2022-11-27 VITALS — HEART RATE: 74 BPM | OXYGEN SATURATION: 94 %

## 2022-11-27 VITALS — OXYGEN SATURATION: 96 %

## 2022-11-27 LAB
ANION GAP SERPL CALC-SCNC: 11.9 MEQ/L (ref 5–15)
BUN SERPL-MCNC: 19 MG/DL (ref 9–20)
CALCIUM SPEC-MCNC: 7.6 MG/DL (ref 8.4–10.2)
CHLORIDE SPEC-SCNC: 102 MMOL/L (ref 98–107)
CO2 SERPL-SCNC: 32 MMOL/L (ref 22–30)
CREAT BLD-SCNC: 0.6 MG/DL (ref 0.66–1.25)
CREATININE CLEARANCE ESTIMATED: 144 ML/MIN (ref 50–200)
ESTIMATED GLOMERULAR FILT RATE: 143 ML/MIN (ref 60–?)
GFR (AFRICAN AMERICAN): 173 ML/MIN (ref 60–?)
GLUCOSE: 115 MG/DL (ref 74–100)
HCT VFR BLD CALC: 31.9 % (ref 42–52)
HGB BLD-MCNC: 9.3 G/DL (ref 14.1–18)
MAGNESIUM: 1.5 MG/DL (ref 1.6–2.3)
MCHC RBC-ENTMCNC: 29.1 G/DL (ref 31.8–35.4)
MCV RBC: 107 FL (ref 80–94)
MEAN CORPUSCULAR HEMOGLOBIN: 31.1 PG (ref 27–31.2)
PLATELET # BLD: 121 K/MM3 (ref 142–424)
POTASSIUM: 3.9 MMOL/L (ref 3.5–5.1)
RBC # BLD AUTO: 2.98 M/MM3 (ref 4.6–6.2)
SODIUM SPEC-SCNC: 142 MMOL/L (ref 136–145)
VANCOMYCIN TROUGH SERPL-MCNC: 27.1 UG/ML (ref 5–10)
WBC # BLD AUTO: 6 K/MM3 (ref 4.8–10.8)

## 2022-11-27 PROCEDURE — 0JDN0ZZ EXTRACTION OF RIGHT LOWER LEG SUBCUTANEOUS TISSUE AND FASCIA, OPEN APPROACH: ICD-10-PCS | Performed by: ORTHOPAEDIC SURGERY

## 2022-11-28 VITALS
DIASTOLIC BLOOD PRESSURE: 93 MMHG | SYSTOLIC BLOOD PRESSURE: 160 MMHG | OXYGEN SATURATION: 99 % | RESPIRATION RATE: 16 BRPM | TEMPERATURE: 97.9 F | HEART RATE: 89 BPM

## 2022-11-28 VITALS
HEART RATE: 79 BPM | DIASTOLIC BLOOD PRESSURE: 83 MMHG | TEMPERATURE: 98.3 F | OXYGEN SATURATION: 97 % | RESPIRATION RATE: 18 BRPM | SYSTOLIC BLOOD PRESSURE: 148 MMHG

## 2022-11-28 VITALS
SYSTOLIC BLOOD PRESSURE: 166 MMHG | HEART RATE: 85 BPM | OXYGEN SATURATION: 91 % | TEMPERATURE: 98.2 F | RESPIRATION RATE: 18 BRPM | DIASTOLIC BLOOD PRESSURE: 90 MMHG

## 2022-11-28 VITALS — TEMPERATURE: 97.7 F | SYSTOLIC BLOOD PRESSURE: 152 MMHG | DIASTOLIC BLOOD PRESSURE: 87 MMHG | HEART RATE: 61 BPM

## 2022-11-28 VITALS
SYSTOLIC BLOOD PRESSURE: 148 MMHG | HEART RATE: 85 BPM | RESPIRATION RATE: 18 BRPM | TEMPERATURE: 98.06 F | DIASTOLIC BLOOD PRESSURE: 85 MMHG | OXYGEN SATURATION: 98 %

## 2022-11-28 VITALS
HEART RATE: 86 BPM | OXYGEN SATURATION: 97 % | DIASTOLIC BLOOD PRESSURE: 93 MMHG | TEMPERATURE: 97.9 F | SYSTOLIC BLOOD PRESSURE: 153 MMHG | RESPIRATION RATE: 20 BRPM

## 2022-11-28 VITALS — HEART RATE: 90 BPM | OXYGEN SATURATION: 95 %

## 2022-11-28 VITALS — HEART RATE: 88 BPM

## 2022-11-28 VITALS — HEART RATE: 70 BPM

## 2022-11-28 VITALS — HEART RATE: 94 BPM

## 2022-11-28 VITALS — HEART RATE: 73 BPM

## 2022-11-28 VITALS — HEART RATE: 89 BPM

## 2022-11-28 LAB
ALBUMIN LEVEL: 2.4 G/DL (ref 3.5–5)
ALBUMIN/GLOB SERPL: 0.5 {RATIO} (ref 1.1–1.8)
ALP ISO SERPL-ACNC: 344 U/L (ref 38–126)
ALT SERPLBLD-CCNC: 41 U/L (ref 12–78)
ANION GAP SERPL CALC-SCNC: 12.2 MEQ/L (ref 5–15)
AST SERPL QL: 64 U/L (ref 17–59)
BILIRUBIN,TOTAL: 1.6 MG/DL (ref 0.2–1.3)
BUN SERPL-MCNC: 14 MG/DL (ref 9–20)
CALCIUM SPEC-MCNC: 7.2 MG/DL (ref 8.4–10.2)
CHLORIDE SPEC-SCNC: 95 MMOL/L (ref 98–107)
CO2 SERPL-SCNC: 33 MMOL/L (ref 22–30)
CREAT BLD-SCNC: 0.6 MG/DL (ref 0.66–1.25)
CREATININE CLEARANCE ESTIMATED: 144 ML/MIN (ref 50–200)
ESTIMATED GLOMERULAR FILT RATE: 143 ML/MIN (ref 60–?)
GFR (AFRICAN AMERICAN): 173 ML/MIN (ref 60–?)
GLOBULIN SER CALC-MCNC: 5 G/DL (ref 1.3–3.2)
GLUCOSE: 100 MG/DL (ref 74–100)
HCT VFR BLD CALC: 34.2 % (ref 42–52)
HGB BLD-MCNC: 10.5 G/DL (ref 14.1–18)
LIPASE: 97 U/L (ref 23–300)
MAGNESIUM: 1.4 MG/DL (ref 1.6–2.3)
MCHC RBC-ENTMCNC: 30.6 G/DL (ref 31.8–35.4)
MCV RBC: 106.1 FL (ref 80–94)
MEAN CORPUSCULAR HEMOGLOBIN: 32.4 PG (ref 27–31.2)
PHOSPHOROUS: 2.6 MG/DL (ref 2.5–4.5)
PLATELET # BLD: 119 K/MM3 (ref 142–424)
POTASSIUM: 3.2 MMOL/L (ref 3.5–5.1)
PROT SERPL-MCNC: 7.4 G/DL (ref 6.3–8.2)
RBC # BLD AUTO: 3.22 M/MM3 (ref 4.6–6.2)
SODIUM SPEC-SCNC: 137 MMOL/L (ref 136–145)
WBC # BLD AUTO: 4.3 K/MM3 (ref 4.8–10.8)

## 2022-11-29 VITALS
DIASTOLIC BLOOD PRESSURE: 99 MMHG | TEMPERATURE: 98.2 F | OXYGEN SATURATION: 97 % | SYSTOLIC BLOOD PRESSURE: 142 MMHG | HEART RATE: 64 BPM | RESPIRATION RATE: 18 BRPM

## 2022-11-29 VITALS
TEMPERATURE: 97.7 F | DIASTOLIC BLOOD PRESSURE: 78 MMHG | SYSTOLIC BLOOD PRESSURE: 155 MMHG | RESPIRATION RATE: 20 BRPM | HEART RATE: 111 BPM | OXYGEN SATURATION: 98 %

## 2022-11-29 VITALS — OXYGEN SATURATION: 97 % | HEART RATE: 71 BPM | RESPIRATION RATE: 18 BRPM

## 2022-11-29 VITALS
SYSTOLIC BLOOD PRESSURE: 139 MMHG | DIASTOLIC BLOOD PRESSURE: 71 MMHG | TEMPERATURE: 98 F | OXYGEN SATURATION: 96 % | HEART RATE: 82 BPM | RESPIRATION RATE: 18 BRPM

## 2022-11-29 VITALS
OXYGEN SATURATION: 100 % | DIASTOLIC BLOOD PRESSURE: 94 MMHG | TEMPERATURE: 98.7 F | SYSTOLIC BLOOD PRESSURE: 172 MMHG | RESPIRATION RATE: 17 BRPM | HEART RATE: 82 BPM

## 2022-11-29 VITALS
RESPIRATION RATE: 18 BRPM | TEMPERATURE: 98.3 F | DIASTOLIC BLOOD PRESSURE: 89 MMHG | SYSTOLIC BLOOD PRESSURE: 143 MMHG | HEART RATE: 88 BPM | OXYGEN SATURATION: 99 %

## 2022-11-29 VITALS
OXYGEN SATURATION: 97 % | TEMPERATURE: 98 F | SYSTOLIC BLOOD PRESSURE: 163 MMHG | DIASTOLIC BLOOD PRESSURE: 92 MMHG | HEART RATE: 75 BPM | RESPIRATION RATE: 16 BRPM

## 2022-11-29 VITALS — HEART RATE: 71 BPM

## 2022-11-29 VITALS — HEART RATE: 76 BPM

## 2022-11-29 LAB
ALBUMIN LEVEL: 2.4 G/DL (ref 3.5–5)
ALBUMIN/GLOB SERPL: 0.5 {RATIO} (ref 1.1–1.8)
ALP ISO SERPL-ACNC: 330 U/L (ref 38–126)
ALT SERPLBLD-CCNC: 45 U/L (ref 12–78)
ANION GAP SERPL CALC-SCNC: 11.6 MEQ/L (ref 5–15)
AST SERPL QL: 86 U/L (ref 17–59)
BILIRUBIN,TOTAL: 1.9 MG/DL (ref 0.2–1.3)
BUN SERPL-MCNC: 12 MG/DL (ref 9–20)
CALCIUM SPEC-MCNC: 7.2 MG/DL (ref 8.4–10.2)
CHLORIDE SPEC-SCNC: 94 MMOL/L (ref 98–107)
CO2 SERPL-SCNC: 34 MMOL/L (ref 22–30)
CREAT BLD-SCNC: 0.5 MG/DL (ref 0.66–1.25)
CREATININE CLEARANCE ESTIMATED: 173 ML/MIN (ref 50–200)
ESTIMATED GLOMERULAR FILT RATE: 177 ML/MIN (ref 60–?)
GFR (AFRICAN AMERICAN): 214 ML/MIN (ref 60–?)
GLOBULIN SER CALC-MCNC: 4.8 G/DL (ref 1.3–3.2)
GLUCOSE: 160 MG/DL (ref 74–100)
HCT VFR BLD CALC: 36.7 % (ref 42–52)
HGB BLD-MCNC: 11.2 G/DL (ref 14.1–18)
MAGNESIUM: 1.8 MG/DL (ref 1.6–2.3)
MCHC RBC-ENTMCNC: 30.5 G/DL (ref 31.8–35.4)
MCV RBC: 106.6 FL (ref 80–94)
MEAN CORPUSCULAR HEMOGLOBIN: 32.6 PG (ref 27–31.2)
PHOSPHOROUS: 3.3 MG/DL (ref 2.5–4.5)
PLATELET # BLD: 109 K/MM3 (ref 142–424)
POTASSIUM: 3.6 MMOL/L (ref 3.5–5.1)
PROT SERPL-MCNC: 7.2 G/DL (ref 6.3–8.2)
RBC # BLD AUTO: 3.45 M/MM3 (ref 4.6–6.2)
SODIUM SPEC-SCNC: 136 MMOL/L (ref 136–145)
WBC # BLD AUTO: 4.6 K/MM3 (ref 4.8–10.8)

## 2022-11-30 VITALS
TEMPERATURE: 98.8 F | HEART RATE: 74 BPM | RESPIRATION RATE: 18 BRPM | DIASTOLIC BLOOD PRESSURE: 81 MMHG | OXYGEN SATURATION: 94 % | SYSTOLIC BLOOD PRESSURE: 141 MMHG

## 2022-11-30 VITALS — HEART RATE: 65 BPM

## 2022-11-30 VITALS — OXYGEN SATURATION: 94 % | HEART RATE: 74 BPM

## 2022-11-30 VITALS
RESPIRATION RATE: 18 BRPM | OXYGEN SATURATION: 97 % | SYSTOLIC BLOOD PRESSURE: 122 MMHG | TEMPERATURE: 98.06 F | HEART RATE: 69 BPM | DIASTOLIC BLOOD PRESSURE: 58 MMHG

## 2022-11-30 VITALS
DIASTOLIC BLOOD PRESSURE: 62 MMHG | TEMPERATURE: 98.2 F | RESPIRATION RATE: 18 BRPM | SYSTOLIC BLOOD PRESSURE: 123 MMHG | OXYGEN SATURATION: 98 % | HEART RATE: 78 BPM

## 2022-11-30 VITALS
HEART RATE: 77 BPM | TEMPERATURE: 98.1 F | RESPIRATION RATE: 18 BRPM | SYSTOLIC BLOOD PRESSURE: 141 MMHG | OXYGEN SATURATION: 99 % | DIASTOLIC BLOOD PRESSURE: 84 MMHG

## 2022-11-30 LAB
ALBUMIN LEVEL: 2.3 G/DL (ref 3.5–5)
ALBUMIN/GLOB SERPL: 0.5 {RATIO} (ref 1.1–1.8)
ALP ISO SERPL-ACNC: 325 U/L (ref 38–126)
ALT SERPLBLD-CCNC: 39 U/L (ref 12–78)
ANION GAP SERPL CALC-SCNC: 5.3 MEQ/L (ref 5–15)
AST SERPL QL: 74 U/L (ref 17–59)
BILIRUBIN,TOTAL: 1.9 MG/DL (ref 0.2–1.3)
BUN SERPL-MCNC: 16 MG/DL (ref 9–20)
CALCIUM SPEC-MCNC: 8.4 MG/DL (ref 8.4–10.2)
CHLORIDE SPEC-SCNC: 102 MMOL/L (ref 98–107)
CO2 SERPL-SCNC: 36 MMOL/L (ref 22–30)
CREAT BLD-SCNC: 0.6 MG/DL (ref 0.66–1.25)
CREATININE CLEARANCE ESTIMATED: 144 ML/MIN (ref 50–200)
ESTIMATED GLOMERULAR FILT RATE: 143 ML/MIN (ref 60–?)
GFR (AFRICAN AMERICAN): 173 ML/MIN (ref 60–?)
GLOBULIN SER CALC-MCNC: 4.6 G/DL (ref 1.3–3.2)
GLUCOSE: 113 MG/DL (ref 74–100)
HCT VFR BLD CALC: 37.9 % (ref 42–52)
HGB BLD-MCNC: 11.6 G/DL (ref 14.1–18)
MCHC RBC-ENTMCNC: 30.7 G/DL (ref 31.8–35.4)
MCV RBC: 107.1 FL (ref 80–94)
MEAN CORPUSCULAR HEMOGLOBIN: 32.8 PG (ref 27–31.2)
PHOSPHOROUS: 4.4 MG/DL (ref 2.5–4.5)
PLATELET # BLD: 98 K/MM3 (ref 142–424)
POTASSIUM: 4.3 MMOL/L (ref 3.5–5.1)
PROT SERPL-MCNC: 6.9 G/DL (ref 6.3–8.2)
RBC # BLD AUTO: 3.54 M/MM3 (ref 4.6–6.2)
SODIUM SPEC-SCNC: 139 MMOL/L (ref 136–145)
WBC # BLD AUTO: 5.3 K/MM3 (ref 4.8–10.8)

## 2022-12-04 LAB
Lab: NEGATIVE
Lab: POSITIVE

## 2023-01-17 ENCOUNTER — HOSPITAL ENCOUNTER (OUTPATIENT)
Dept: HOSPITAL 22 - ER | Age: 48
Setting detail: OBSERVATION
LOS: 2 days | Discharge: HOME | End: 2023-01-19
Payer: MEDICARE

## 2023-01-17 VITALS
RESPIRATION RATE: 21 BRPM | SYSTOLIC BLOOD PRESSURE: 165 MMHG | DIASTOLIC BLOOD PRESSURE: 94 MMHG | HEART RATE: 109 BPM | OXYGEN SATURATION: 98 %

## 2023-01-17 VITALS
HEART RATE: 103 BPM | OXYGEN SATURATION: 97 % | SYSTOLIC BLOOD PRESSURE: 161 MMHG | RESPIRATION RATE: 21 BRPM | DIASTOLIC BLOOD PRESSURE: 86 MMHG

## 2023-01-17 VITALS
BODY MASS INDEX: 39.1 KG/M2 | BODY MASS INDEX: 38.5 KG/M2 | BODY MASS INDEX: 38.4 KG/M2 | BODY MASS INDEX: 38.1 KG/M2 | BODY MASS INDEX: 38.6 KG/M2

## 2023-01-17 VITALS
DIASTOLIC BLOOD PRESSURE: 94 MMHG | SYSTOLIC BLOOD PRESSURE: 165 MMHG | RESPIRATION RATE: 20 BRPM | HEART RATE: 107 BPM | OXYGEN SATURATION: 98 % | TEMPERATURE: 98.7 F

## 2023-01-17 DIAGNOSIS — K74.60: ICD-10-CM

## 2023-01-17 DIAGNOSIS — F17.210: ICD-10-CM

## 2023-01-17 DIAGNOSIS — F15.10: ICD-10-CM

## 2023-01-17 DIAGNOSIS — I83.028: ICD-10-CM

## 2023-01-17 DIAGNOSIS — I50.21: ICD-10-CM

## 2023-01-17 DIAGNOSIS — J44.9: ICD-10-CM

## 2023-01-17 DIAGNOSIS — I48.0: ICD-10-CM

## 2023-01-17 DIAGNOSIS — Z79.899: ICD-10-CM

## 2023-01-17 DIAGNOSIS — L03.116: Primary | ICD-10-CM

## 2023-01-17 DIAGNOSIS — Z20.822: ICD-10-CM

## 2023-01-17 DIAGNOSIS — I83.018: ICD-10-CM

## 2023-01-17 DIAGNOSIS — E87.6: ICD-10-CM

## 2023-01-17 LAB
ALBUMIN LEVEL: 2.8 G/DL (ref 3.5–5)
ALBUMIN/GLOB SERPL: 0.6 {RATIO} (ref 1.1–1.8)
ALP ISO SERPL-ACNC: 283 U/L (ref 38–126)
ALT SERPLBLD-CCNC: 31 U/L (ref 12–78)
ANION GAP SERPL CALC-SCNC: 6.2 MEQ/L (ref 5–15)
AST SERPL QL: 65 U/L (ref 17–59)
BILIRUBIN,TOTAL: 1.8 MG/DL (ref 0.2–1.3)
BUN SERPL-MCNC: 6 MG/DL (ref 9–20)
CALCIUM SPEC-MCNC: 7.6 MG/DL (ref 8.4–10.2)
CHLORIDE SPEC-SCNC: 106 MMOL/L (ref 98–107)
CO2 SERPL-SCNC: 29 MMOL/L (ref 22–30)
CREAT BLD-SCNC: 0.5 MG/DL (ref 0.66–1.25)
CREATININE CLEARANCE ESTIMATED: 298 ML/MIN (ref 50–200)
CRP SERPL HS-MCNC: 9.9 MG/L (ref 0–4)
ESTIMATED GLOMERULAR FILT RATE: 177 ML/MIN (ref 60–?)
GFR (AFRICAN AMERICAN): 214 ML/MIN (ref 60–?)
GLOBULIN SER CALC-MCNC: 4.7 G/DL (ref 1.3–3.2)
GLUCOSE: 160 MG/DL (ref 74–100)
HCT VFR BLD CALC: 33.8 % (ref 42–52)
HGB BLD-MCNC: 10.9 G/DL (ref 14.1–18)
MCHC RBC-ENTMCNC: 32.2 G/DL (ref 31.8–35.4)
MCV RBC: 98.4 FL (ref 80–94)
MEAN CORPUSCULAR HEMOGLOBIN: 31.7 PG (ref 27–31.2)
PLATELET # BLD: 92 K/MM3 (ref 142–424)
POTASSIUM: 3.2 MMOL/L (ref 3.5–5.1)
PROCALCITONIN: 0.1 NG/ML (ref 0–2)
PROT SERPL-MCNC: 7.5 G/DL (ref 6.3–8.2)
RBC # BLD AUTO: 3.43 M/MM3 (ref 4.6–6.2)
SODIUM SPEC-SCNC: 138 MMOL/L (ref 136–145)
WBC # BLD AUTO: 5.6 K/MM3 (ref 4.8–10.8)

## 2023-01-17 PROCEDURE — 80053 COMPREHEN METABOLIC PANEL: CPT

## 2023-01-17 PROCEDURE — 36415 COLL VENOUS BLD VENIPUNCTURE: CPT

## 2023-01-17 PROCEDURE — 80305 DRUG TEST PRSMV DIR OPT OBS: CPT

## 2023-01-17 PROCEDURE — 85025 COMPLETE CBC W/AUTO DIFF WBC: CPT

## 2023-01-17 PROCEDURE — 85018 HEMOGLOBIN: CPT

## 2023-01-17 PROCEDURE — 87641 MR-STAPH DNA AMP PROBE: CPT

## 2023-01-17 PROCEDURE — 80202 ASSAY OF VANCOMYCIN: CPT

## 2023-01-17 PROCEDURE — 73552 X-RAY EXAM OF FEMUR 2/>: CPT

## 2023-01-17 PROCEDURE — 87040 BLOOD CULTURE FOR BACTERIA: CPT

## 2023-01-17 PROCEDURE — 99285 EMERGENCY DEPT VISIT HI MDM: CPT

## 2023-01-17 PROCEDURE — 73720 MRI LWR EXTREMITY W/O&W/DYE: CPT

## 2023-01-17 PROCEDURE — 85014 HEMATOCRIT: CPT

## 2023-01-17 PROCEDURE — G0378 HOSPITAL OBSERVATION PER HR: HCPCS

## 2023-01-17 PROCEDURE — 85651 RBC SED RATE NONAUTOMATED: CPT

## 2023-01-17 PROCEDURE — 83605 ASSAY OF LACTIC ACID: CPT

## 2023-01-17 PROCEDURE — 84145 PROCALCITONIN (PCT): CPT

## 2023-01-17 PROCEDURE — U0003 INFECTIOUS AGENT DETECTION BY NUCLEIC ACID (DNA OR RNA); SEVERE ACUTE RESPIRATORY SYNDROME CORONAVIRUS 2 (SARS-COV-2) (CORONAVIRUS DISEASE [COVID-19]), AMPLIFIED PROBE TECHNIQUE, MAKING USE OF HIGH THROUGHPUT TECHNOLOGIES AS DESCRIBED BY CMS-2020-01-R: HCPCS

## 2023-01-17 PROCEDURE — 85048 AUTOMATED LEUKOCYTE COUNT: CPT

## 2023-01-17 PROCEDURE — 85049 AUTOMATED PLATELET COUNT: CPT

## 2023-01-17 PROCEDURE — 73701 CT LOWER EXTREMITY W/DYE: CPT

## 2023-01-17 PROCEDURE — 93971 EXTREMITY STUDY: CPT

## 2023-01-17 PROCEDURE — 82140 ASSAY OF AMMONIA: CPT

## 2023-01-17 PROCEDURE — 83735 ASSAY OF MAGNESIUM: CPT

## 2023-01-17 PROCEDURE — U0005 INFEC AGEN DETEC AMPLI PROBE: HCPCS

## 2023-01-17 PROCEDURE — C9803 HOPD COVID-19 SPEC COLLECT: HCPCS

## 2023-01-17 PROCEDURE — 85610 PROTHROMBIN TIME: CPT

## 2023-01-17 PROCEDURE — 87081 CULTURE SCREEN ONLY: CPT

## 2023-01-17 PROCEDURE — A9576 INJ PROHANCE MULTIPACK: HCPCS

## 2023-01-17 PROCEDURE — 85007 BL SMEAR W/DIFF WBC COUNT: CPT

## 2023-01-17 PROCEDURE — 86140 C-REACTIVE PROTEIN: CPT

## 2023-01-17 PROCEDURE — 94640 AIRWAY INHALATION TREATMENT: CPT

## 2023-01-18 VITALS
OXYGEN SATURATION: 97 % | RESPIRATION RATE: 18 BRPM | TEMPERATURE: 98.42 F | SYSTOLIC BLOOD PRESSURE: 157 MMHG | HEART RATE: 77 BPM | DIASTOLIC BLOOD PRESSURE: 98 MMHG

## 2023-01-18 VITALS
RESPIRATION RATE: 17 BRPM | TEMPERATURE: 99 F | HEART RATE: 76 BPM | SYSTOLIC BLOOD PRESSURE: 137 MMHG | DIASTOLIC BLOOD PRESSURE: 92 MMHG | OXYGEN SATURATION: 96 %

## 2023-01-18 VITALS
OXYGEN SATURATION: 93 % | TEMPERATURE: 98.78 F | SYSTOLIC BLOOD PRESSURE: 109 MMHG | DIASTOLIC BLOOD PRESSURE: 92 MMHG | RESPIRATION RATE: 20 BRPM | HEART RATE: 103 BPM

## 2023-01-18 VITALS
OXYGEN SATURATION: 92 % | TEMPERATURE: 99.32 F | RESPIRATION RATE: 22 BRPM | HEART RATE: 104 BPM | DIASTOLIC BLOOD PRESSURE: 118 MMHG | SYSTOLIC BLOOD PRESSURE: 164 MMHG

## 2023-01-18 VITALS
SYSTOLIC BLOOD PRESSURE: 181 MMHG | HEART RATE: 98 BPM | RESPIRATION RATE: 20 BRPM | TEMPERATURE: 97.9 F | OXYGEN SATURATION: 99 % | DIASTOLIC BLOOD PRESSURE: 99 MMHG

## 2023-01-18 VITALS
HEART RATE: 94 BPM | OXYGEN SATURATION: 95 % | DIASTOLIC BLOOD PRESSURE: 92 MMHG | RESPIRATION RATE: 18 BRPM | SYSTOLIC BLOOD PRESSURE: 144 MMHG | TEMPERATURE: 98.42 F

## 2023-01-18 VITALS — OXYGEN SATURATION: 97 %

## 2023-01-18 LAB
ALBUMIN LEVEL: 2.6 G/DL (ref 3.5–5)
ALBUMIN/GLOB SERPL: 0.6 {RATIO} (ref 1.1–1.8)
ALP ISO SERPL-ACNC: 255 U/L (ref 38–126)
ALT SERPLBLD-CCNC: 27 U/L (ref 12–78)
AMMONIA: 120 UMOL/L (ref 9–30)
ANION GAP SERPL CALC-SCNC: 5.3 MEQ/L (ref 5–15)
AST SERPL QL: 52 U/L (ref 17–59)
BILIRUBIN,TOTAL: 2.2 MG/DL (ref 0.2–1.3)
BUN SERPL-MCNC: 6 MG/DL (ref 9–20)
CALCIUM SPEC-MCNC: 7.3 MG/DL (ref 8.4–10.2)
CELLS COUNTED: 100
CHLORIDE SPEC-SCNC: 109 MMOL/L (ref 98–107)
CO2 SERPL-SCNC: 28 MMOL/L (ref 22–30)
CREAT BLD-SCNC: 0.6 MG/DL (ref 0.66–1.25)
CREATININE CLEARANCE ESTIMATED: 248 ML/MIN (ref 50–200)
ESTIMATED GLOMERULAR FILT RATE: 143 ML/MIN (ref 60–?)
GFR (AFRICAN AMERICAN): 173 ML/MIN (ref 60–?)
GLOBULIN SER CALC-MCNC: 4.4 G/DL (ref 1.3–3.2)
GLUCOSE: 102 MG/DL (ref 74–100)
HCT VFR BLD CALC: 33.1 % (ref 42–52)
HGB BLD-MCNC: 10.7 G/DL (ref 14.1–18)
LACTATE SERPL-MCNC: 0.8 MMOL/L (ref 0.7–2.1)
MAGNESIUM: 1.4 MG/DL (ref 1.6–2.3)
MANUAL DIFFERENTIAL: (no result)
MCHC RBC-ENTMCNC: 32.4 G/DL (ref 31.8–35.4)
MCV RBC: 99.7 FL (ref 80–94)
MEAN CORPUSCULAR HEMOGLOBIN: 32.3 PG (ref 27–31.2)
PLATELET # BLD: 89 K/MM3 (ref 142–424)
POTASSIUM: 3.3 MMOL/L (ref 3.5–5.1)
PROT SERPL-MCNC: 7 G/DL (ref 6.3–8.2)
RBC # BLD AUTO: 3.32 M/MM3 (ref 4.6–6.2)
REFLEX LACTIC: (no result)
SODIUM SPEC-SCNC: 139 MMOL/L (ref 136–145)
WBC # BLD AUTO: 5.2 K/MM3 (ref 4.8–10.8)

## 2023-01-19 VITALS
HEART RATE: 67 BPM | DIASTOLIC BLOOD PRESSURE: 85 MMHG | OXYGEN SATURATION: 95 % | SYSTOLIC BLOOD PRESSURE: 138 MMHG | TEMPERATURE: 98.06 F | RESPIRATION RATE: 18 BRPM

## 2023-01-19 VITALS
OXYGEN SATURATION: 97 % | DIASTOLIC BLOOD PRESSURE: 67 MMHG | HEART RATE: 69 BPM | TEMPERATURE: 97.88 F | SYSTOLIC BLOOD PRESSURE: 133 MMHG | RESPIRATION RATE: 17 BRPM

## 2023-01-19 LAB
ALBUMIN LEVEL: 2.3 G/DL (ref 3.5–5)
ALBUMIN/GLOB SERPL: 0.6 {RATIO} (ref 1.1–1.8)
ALP ISO SERPL-ACNC: 184 U/L (ref 38–126)
ALT SERPLBLD-CCNC: 22 U/L (ref 12–78)
ANION GAP SERPL CALC-SCNC: 4.8 MEQ/L (ref 5–15)
AST SERPL QL: 44 U/L (ref 17–59)
BILIRUBIN,TOTAL: 1.7 MG/DL (ref 0.2–1.3)
BUN SERPL-MCNC: 9 MG/DL (ref 9–20)
CALCIUM SPEC-MCNC: 7.2 MG/DL (ref 8.4–10.2)
CHLORIDE SPEC-SCNC: 107 MMOL/L (ref 98–107)
CO2 SERPL-SCNC: 29 MMOL/L (ref 22–30)
CREAT BLD-SCNC: 0.5 MG/DL (ref 0.66–1.25)
CREATININE CLEARANCE ESTIMATED: 299 ML/MIN (ref 50–200)
ESTIMATED GLOMERULAR FILT RATE: 177 ML/MIN (ref 60–?)
GFR (AFRICAN AMERICAN): 214 ML/MIN (ref 60–?)
GLOBULIN SER CALC-MCNC: 3.9 G/DL (ref 1.3–3.2)
GLUCOSE: 97 MG/DL (ref 74–100)
HCT VFR BLD CALC: 32.3 % (ref 42–52)
HGB BLD-MCNC: 10.2 G/DL (ref 14.1–18)
INR PPP: 1.53 (ref 0.9–1.1)
MCHC RBC-ENTMCNC: 31.5 G/DL (ref 31.8–35.4)
MCV RBC: 101.4 FL (ref 80–94)
MEAN CORPUSCULAR HEMOGLOBIN: 32 PG (ref 27–31.2)
PLATELET # BLD: 76 K/MM3 (ref 142–424)
POTASSIUM: 3.8 MMOL/L (ref 3.5–5.1)
PROT SERPL-MCNC: 6.2 G/DL (ref 6.3–8.2)
PT BLD: 16.1 SECONDS (ref 10.1–12.5)
RBC # BLD AUTO: 3.19 M/MM3 (ref 4.6–6.2)
SODIUM SPEC-SCNC: 137 MMOL/L (ref 136–145)
VANCOMYCIN TROUGH SERPL-MCNC: 23.9 UG/ML (ref 5–10)
WBC # BLD AUTO: 4.5 K/MM3 (ref 4.8–10.8)

## 2023-01-21 LAB — MRSA DNA PCR: NEGATIVE

## 2023-03-27 ENCOUNTER — HOSPITAL ENCOUNTER (OUTPATIENT)
Dept: HOSPITAL 22 - ER | Age: 48
Setting detail: OBSERVATION
LOS: 1 days | Discharge: HOME | End: 2023-03-28
Payer: MEDICARE

## 2023-03-27 VITALS
HEART RATE: 83 BPM | OXYGEN SATURATION: 97 % | DIASTOLIC BLOOD PRESSURE: 85 MMHG | TEMPERATURE: 98.1 F | SYSTOLIC BLOOD PRESSURE: 124 MMHG | RESPIRATION RATE: 20 BRPM

## 2023-03-27 VITALS
HEART RATE: 59 BPM | SYSTOLIC BLOOD PRESSURE: 134 MMHG | RESPIRATION RATE: 16 BRPM | OXYGEN SATURATION: 98 % | TEMPERATURE: 98.7 F | DIASTOLIC BLOOD PRESSURE: 81 MMHG

## 2023-03-27 VITALS
DIASTOLIC BLOOD PRESSURE: 83 MMHG | TEMPERATURE: 98.24 F | SYSTOLIC BLOOD PRESSURE: 124 MMHG | OXYGEN SATURATION: 98 % | HEART RATE: 56 BPM | RESPIRATION RATE: 20 BRPM

## 2023-03-27 VITALS — SYSTOLIC BLOOD PRESSURE: 132 MMHG | HEART RATE: 57 BPM | OXYGEN SATURATION: 96 % | DIASTOLIC BLOOD PRESSURE: 75 MMHG

## 2023-03-27 VITALS — SYSTOLIC BLOOD PRESSURE: 127 MMHG | HEART RATE: 99 BPM | DIASTOLIC BLOOD PRESSURE: 81 MMHG | OXYGEN SATURATION: 94 %

## 2023-03-27 VITALS — HEART RATE: 111 BPM | OXYGEN SATURATION: 94 % | SYSTOLIC BLOOD PRESSURE: 127 MMHG | DIASTOLIC BLOOD PRESSURE: 86 MMHG

## 2023-03-27 VITALS
RESPIRATION RATE: 19 BRPM | HEART RATE: 84 BPM | TEMPERATURE: 97.3 F | DIASTOLIC BLOOD PRESSURE: 77 MMHG | OXYGEN SATURATION: 97 % | SYSTOLIC BLOOD PRESSURE: 146 MMHG

## 2023-03-27 VITALS
OXYGEN SATURATION: 100 % | SYSTOLIC BLOOD PRESSURE: 132 MMHG | RESPIRATION RATE: 22 BRPM | DIASTOLIC BLOOD PRESSURE: 76 MMHG | TEMPERATURE: 98.24 F | HEART RATE: 88 BPM

## 2023-03-27 VITALS
RESPIRATION RATE: 17 BRPM | BODY MASS INDEX: 39.8 KG/M2 | TEMPERATURE: 100.4 F | OXYGEN SATURATION: 97 % | SYSTOLIC BLOOD PRESSURE: 150 MMHG | HEART RATE: 117 BPM | BODY MASS INDEX: 38.8 KG/M2 | BODY MASS INDEX: 36.9 KG/M2 | DIASTOLIC BLOOD PRESSURE: 96 MMHG

## 2023-03-27 VITALS — SYSTOLIC BLOOD PRESSURE: 169 MMHG | HEART RATE: 110 BPM | OXYGEN SATURATION: 97 % | DIASTOLIC BLOOD PRESSURE: 113 MMHG

## 2023-03-27 VITALS — HEART RATE: 86 BPM

## 2023-03-27 VITALS — HEART RATE: 116 BPM | DIASTOLIC BLOOD PRESSURE: 116 MMHG | OXYGEN SATURATION: 95 % | SYSTOLIC BLOOD PRESSURE: 178 MMHG

## 2023-03-27 VITALS — DIASTOLIC BLOOD PRESSURE: 95 MMHG | OXYGEN SATURATION: 98 % | HEART RATE: 102 BPM | SYSTOLIC BLOOD PRESSURE: 130 MMHG

## 2023-03-27 VITALS — HEART RATE: 78 BPM

## 2023-03-27 VITALS — HEART RATE: 80 BPM

## 2023-03-27 DIAGNOSIS — I48.91: ICD-10-CM

## 2023-03-27 DIAGNOSIS — L03.116: Primary | ICD-10-CM

## 2023-03-27 DIAGNOSIS — I11.0: ICD-10-CM

## 2023-03-27 DIAGNOSIS — Z20.822: ICD-10-CM

## 2023-03-27 DIAGNOSIS — F17.290: ICD-10-CM

## 2023-03-27 DIAGNOSIS — J44.9: ICD-10-CM

## 2023-03-27 DIAGNOSIS — I50.20: ICD-10-CM

## 2023-03-27 DIAGNOSIS — F15.10: ICD-10-CM

## 2023-03-27 DIAGNOSIS — Z79.899: ICD-10-CM

## 2023-03-27 LAB
ALBUMIN LEVEL: 2.8 G/DL (ref 3.5–5)
ALBUMIN/GLOB SERPL: 0.7 {RATIO} (ref 1.1–1.8)
ALP ISO SERPL-ACNC: 236 U/L (ref 38–126)
ALT SERPLBLD-CCNC: 39 U/L (ref 12–78)
ANION GAP SERPL CALC-SCNC: 4.6 MEQ/L (ref 5–15)
AST SERPL QL: 66 U/L (ref 17–59)
BILIRUBIN,TOTAL: 2.6 MG/DL (ref 0.2–1.3)
BUN SERPL-MCNC: 10 MG/DL (ref 9–20)
CALCIUM SPEC-MCNC: 7.6 MG/DL (ref 8.4–10.2)
CELLS COUNTED: 100
CHLORIDE SPEC-SCNC: 104 MMOL/L (ref 98–107)
CO2 BLDCOA-SCNC: 27.3 MMHG (ref 23–27)
CO2 SERPL-SCNC: 28 MMOL/L (ref 22–30)
COLOR UR: YELLOW
CREAT BLD-SCNC: 0.6 MG/DL (ref 0.66–1.25)
CREATININE CLEARANCE ESTIMATED: 239 ML/MIN (ref 50–200)
CRP SERPL HS-MCNC: 18.5 MG/L (ref 0–4)
ESTIMATED GLOMERULAR FILT RATE: 143 ML/MIN (ref 60–?)
GFR (AFRICAN AMERICAN): 173 ML/MIN (ref 60–?)
GLOBULIN SER CALC-MCNC: 4.2 G/DL (ref 1.3–3.2)
GLUCOSE: 93 MG/DL (ref 74–100)
HCO3 BLDA-SCNC: 26.2 MMHG (ref 22–26)
HCT VFR BLD CALC: 35.5 % (ref 42–52)
HGB BLD-MCNC: 11.7 G/DL (ref 14.1–18)
MANUAL DIFFERENTIAL: (no result)
MCHC RBC-ENTMCNC: 33 G/DL (ref 31.8–35.4)
MCV RBC: 95.7 FL (ref 80–94)
MEAN CORPUSCULAR HEMOGLOBIN: 31.6 PG (ref 27–31.2)
MICRO URNS: (no result)
PCO2 BLDA: 37.3 MMHG (ref 35–45)
PH BLDA: 7.46 MMOL/L (ref 7.35–7.45)
PH UR: 8 [PH] (ref 5–8.5)
PLATELET # BLD: 81 K/MM3 (ref 142–424)
PO2 BLDA: 81 MMHG (ref 80–100)
POTASSIUM: 3.6 MMOL/L (ref 3.5–5.1)
PROT SERPL-MCNC: 7 G/DL (ref 6.3–8.2)
RBC # BLD AUTO: 3.7 M/MM3 (ref 4.6–6.2)
RBC #/AREA URNS HPF: (no result) #/HPF (ref 0–3)
SODIUM SPEC-SCNC: 133 MMOL/L (ref 136–145)
SP GR UR: 1.01 (ref 1–1.03)
TROPONIN I: 0.04 NG/ML (ref 0–0.03)
UROBILINOGEN UR QL: 4 EU/DL
WBC # BLD AUTO: 10.4 K/MM3 (ref 4.8–10.8)

## 2023-03-27 PROCEDURE — 80202 ASSAY OF VANCOMYCIN: CPT

## 2023-03-27 PROCEDURE — 99285 EMERGENCY DEPT VISIT HI MDM: CPT

## 2023-03-27 PROCEDURE — 36415 COLL VENOUS BLD VENIPUNCTURE: CPT

## 2023-03-27 PROCEDURE — 93005 ELECTROCARDIOGRAM TRACING: CPT

## 2023-03-27 PROCEDURE — 80305 DRUG TEST PRSMV DIR OPT OBS: CPT

## 2023-03-27 PROCEDURE — 80048 BASIC METABOLIC PNL TOTAL CA: CPT

## 2023-03-27 PROCEDURE — 85007 BL SMEAR W/DIFF WBC COUNT: CPT

## 2023-03-27 PROCEDURE — 71046 X-RAY EXAM CHEST 2 VIEWS: CPT

## 2023-03-27 PROCEDURE — C9803 HOPD COVID-19 SPEC COLLECT: HCPCS

## 2023-03-27 PROCEDURE — 87081 CULTURE SCREEN ONLY: CPT

## 2023-03-27 PROCEDURE — 83605 ASSAY OF LACTIC ACID: CPT

## 2023-03-27 PROCEDURE — 84484 ASSAY OF TROPONIN QUANT: CPT

## 2023-03-27 PROCEDURE — 81001 URINALYSIS AUTO W/SCOPE: CPT

## 2023-03-27 PROCEDURE — 85025 COMPLETE CBC W/AUTO DIFF WBC: CPT

## 2023-03-27 PROCEDURE — U0005 INFEC AGEN DETEC AMPLI PROBE: HCPCS

## 2023-03-27 PROCEDURE — 94640 AIRWAY INHALATION TREATMENT: CPT

## 2023-03-27 PROCEDURE — G0378 HOSPITAL OBSERVATION PER HR: HCPCS

## 2023-03-27 PROCEDURE — 80053 COMPREHEN METABOLIC PANEL: CPT

## 2023-03-27 PROCEDURE — 85651 RBC SED RATE NONAUTOMATED: CPT

## 2023-03-27 PROCEDURE — 73701 CT LOWER EXTREMITY W/DYE: CPT

## 2023-03-27 PROCEDURE — U0003 INFECTIOUS AGENT DETECTION BY NUCLEIC ACID (DNA OR RNA); SEVERE ACUTE RESPIRATORY SYNDROME CORONAVIRUS 2 (SARS-COV-2) (CORONAVIRUS DISEASE [COVID-19]), AMPLIFIED PROBE TECHNIQUE, MAKING USE OF HIGH THROUGHPUT TECHNOLOGIES AS DESCRIBED BY CMS-2020-01-R: HCPCS

## 2023-03-27 PROCEDURE — 87077 CULTURE AEROBIC IDENTIFY: CPT

## 2023-03-27 PROCEDURE — 71275 CT ANGIOGRAPHY CHEST: CPT

## 2023-03-27 PROCEDURE — 87040 BLOOD CULTURE FOR BACTERIA: CPT

## 2023-03-27 PROCEDURE — 82803 BLOOD GASES ANY COMBINATION: CPT

## 2023-03-27 PROCEDURE — 86140 C-REACTIVE PROTEIN: CPT

## 2023-03-28 VITALS
TEMPERATURE: 98.96 F | HEART RATE: 100 BPM | SYSTOLIC BLOOD PRESSURE: 93 MMHG | OXYGEN SATURATION: 94 % | DIASTOLIC BLOOD PRESSURE: 69 MMHG | RESPIRATION RATE: 22 BRPM

## 2023-03-28 VITALS
TEMPERATURE: 98.1 F | DIASTOLIC BLOOD PRESSURE: 84 MMHG | SYSTOLIC BLOOD PRESSURE: 143 MMHG | HEART RATE: 86 BPM | OXYGEN SATURATION: 97 % | RESPIRATION RATE: 17 BRPM

## 2023-03-28 VITALS — HEART RATE: 90 BPM | OXYGEN SATURATION: 98 %

## 2023-03-28 VITALS
TEMPERATURE: 97.7 F | DIASTOLIC BLOOD PRESSURE: 88 MMHG | RESPIRATION RATE: 18 BRPM | SYSTOLIC BLOOD PRESSURE: 160 MMHG | HEART RATE: 82 BPM | OXYGEN SATURATION: 97 %

## 2023-03-28 VITALS
TEMPERATURE: 98.6 F | SYSTOLIC BLOOD PRESSURE: 146 MMHG | HEART RATE: 89 BPM | RESPIRATION RATE: 22 BRPM | DIASTOLIC BLOOD PRESSURE: 96 MMHG | OXYGEN SATURATION: 96 %

## 2023-03-28 VITALS — HEART RATE: 98 BPM

## 2023-03-28 VITALS — HEART RATE: 78 BPM

## 2023-03-28 LAB
ANION GAP SERPL CALC-SCNC: 5.6 MEQ/L (ref 5–15)
BUN SERPL-MCNC: 16 MG/DL (ref 9–20)
CALCIUM SPEC-MCNC: 7.5 MG/DL (ref 8.4–10.2)
CHLORIDE SPEC-SCNC: 103 MMOL/L (ref 98–107)
CO2 SERPL-SCNC: 28 MMOL/L (ref 22–30)
CREAT BLD-SCNC: 0.7 MG/DL (ref 0.66–1.25)
CREATININE CLEARANCE ESTIMATED: 215 ML/MIN (ref 50–200)
ESTIMATED GLOMERULAR FILT RATE: 120 ML/MIN (ref 60–?)
GFR (AFRICAN AMERICAN): 145 ML/MIN (ref 60–?)
GLUCOSE: 94 MG/DL (ref 74–100)
HCT VFR BLD CALC: 35 % (ref 42–52)
HGB BLD-MCNC: 11.2 G/DL (ref 14.1–18)
MCHC RBC-ENTMCNC: 32.1 G/DL (ref 31.8–35.4)
MCV RBC: 98.8 FL (ref 80–94)
MEAN CORPUSCULAR HEMOGLOBIN: 31.7 PG (ref 27–31.2)
PLATELET # BLD: 78 K/MM3 (ref 142–424)
POTASSIUM: 3.6 MMOL/L (ref 3.5–5.1)
RBC # BLD AUTO: 3.55 M/MM3 (ref 4.6–6.2)
SODIUM SPEC-SCNC: 133 MMOL/L (ref 136–145)
VANCOMYCIN TROUGH SERPL-MCNC: 22.9 UG/ML (ref 5–10)
VANCOMYCIN TROUGH SERPL-MCNC: 30.3 UG/ML (ref 5–10)
WBC # BLD AUTO: 4.5 K/MM3 (ref 4.8–10.8)

## 2023-05-04 ENCOUNTER — HOSPITAL ENCOUNTER (EMERGENCY)
Age: 48
Discharge: HOME | End: 2023-05-04
Payer: MEDICARE

## 2023-05-04 VITALS
RESPIRATION RATE: 20 BRPM | HEART RATE: 101 BPM | OXYGEN SATURATION: 99 % | SYSTOLIC BLOOD PRESSURE: 179 MMHG | DIASTOLIC BLOOD PRESSURE: 89 MMHG | TEMPERATURE: 98.2 F

## 2023-05-04 VITALS
OXYGEN SATURATION: 98 % | RESPIRATION RATE: 18 BRPM | HEART RATE: 86 BPM | SYSTOLIC BLOOD PRESSURE: 167 MMHG | DIASTOLIC BLOOD PRESSURE: 89 MMHG

## 2023-05-04 VITALS
RESPIRATION RATE: 20 BRPM | DIASTOLIC BLOOD PRESSURE: 80 MMHG | SYSTOLIC BLOOD PRESSURE: 164 MMHG | TEMPERATURE: 98.24 F | OXYGEN SATURATION: 99 % | HEART RATE: 90 BPM

## 2023-05-04 VITALS
DIASTOLIC BLOOD PRESSURE: 101 MMHG | RESPIRATION RATE: 18 BRPM | OXYGEN SATURATION: 99 % | SYSTOLIC BLOOD PRESSURE: 173 MMHG | HEART RATE: 96 BPM

## 2023-05-04 VITALS — HEART RATE: 95 BPM

## 2023-05-04 VITALS — BODY MASS INDEX: 42.5 KG/M2

## 2023-05-04 DIAGNOSIS — J44.1: Primary | ICD-10-CM

## 2023-05-04 LAB
ALBUMIN LEVEL: 2.9 G/DL (ref 3.5–5)
ALBUMIN/GLOB SERPL: 0.7 {RATIO} (ref 1.1–1.8)
ALP ISO SERPL-ACNC: 230 U/L (ref 38–126)
ALT SERPLBLD-CCNC: 43 U/L (ref 12–78)
AMMONIA: 139 UMOL/L (ref 9–30)
ANION GAP SERPL CALC-SCNC: 10.6 MEQ/L (ref 5–15)
AST SERPL QL: 81 U/L (ref 17–59)
BILIRUBIN,TOTAL: 1.7 MG/DL (ref 0.2–1.3)
BUN SERPL-MCNC: 11 MG/DL (ref 9–20)
CALCIUM SPEC-MCNC: 7.7 MG/DL (ref 8.4–10.2)
CHLORIDE SPEC-SCNC: 94 MMOL/L (ref 98–107)
CO2 SERPL-SCNC: 34 MMOL/L (ref 22–30)
CREATININE CLEARANCE ESTIMATED: 179 ML/MIN (ref 50–200)
CREATININE,SERUM: 0.5 MG/DL (ref 0.66–1.25)
ESTIMATED GLOMERULAR FILT RATE: 177 ML/MIN (ref 60–?)
GFR (AFRICAN AMERICAN): 214 ML/MIN (ref 60–?)
GLOBULIN SER CALC-MCNC: 4.4 G/DL (ref 1.3–3.2)
GLUCOSE: 92 MG/DL (ref 74–100)
HCO3 BLDV-SCNC: 28 MMOL/L (ref 23–30)
HCT VFR BLD CALC: 34 % (ref 42–52)
HGB BLD-MCNC: 10.8 G/DL (ref 14.1–18)
MAGNESIUM: 1.5 MG/DL (ref 1.6–2.3)
MCHC RBC-ENTMCNC: 31.8 G/DL (ref 31.8–35.4)
MCV RBC: 100.9 FL (ref 80–94)
MEAN CORPUSCULAR HEMOGLOBIN: 32.1 PG (ref 27–31.2)
PCO2 BLDV: 45.5 MMOL/L (ref 35–51)
PH BLDV: 7.41 MMOL/L (ref 7.31–7.41)
PLATELET # BLD: 75 K/MM3 (ref 142–424)
PO2 BLDV: 102.6 MMOL/L (ref 28–40)
POTASSIUM: 3.6 MMOL/L (ref 3.5–5.1)
PROT SERPL-MCNC: 7.3 G/DL (ref 6.3–8.2)
RBC # BLD AUTO: 3.37 M/MM3 (ref 4.6–6.2)
SODIUM SPEC-SCNC: 135 MMOL/L (ref 136–145)
TROPONIN I: 0.03 NG/ML (ref 0–0.03)
WBC # BLD AUTO: 3.7 K/MM3 (ref 4.8–10.8)

## 2023-05-04 PROCEDURE — 84484 ASSAY OF TROPONIN QUANT: CPT

## 2023-05-04 PROCEDURE — 96374 THER/PROPH/DIAG INJ IV PUSH: CPT

## 2023-05-04 PROCEDURE — 99285 EMERGENCY DEPT VISIT HI MDM: CPT

## 2023-05-04 PROCEDURE — 82140 ASSAY OF AMMONIA: CPT

## 2023-05-04 PROCEDURE — 80053 COMPREHEN METABOLIC PANEL: CPT

## 2023-05-04 PROCEDURE — 82803 BLOOD GASES ANY COMBINATION: CPT

## 2023-05-04 PROCEDURE — 93005 ELECTROCARDIOGRAM TRACING: CPT

## 2023-05-04 PROCEDURE — 71045 X-RAY EXAM CHEST 1 VIEW: CPT

## 2023-05-04 PROCEDURE — 85025 COMPLETE CBC W/AUTO DIFF WBC: CPT

## 2023-05-04 PROCEDURE — 83735 ASSAY OF MAGNESIUM: CPT

## 2023-05-04 PROCEDURE — 96375 TX/PRO/DX INJ NEW DRUG ADDON: CPT

## 2023-09-19 ENCOUNTER — READMISSION MANAGEMENT (OUTPATIENT)
Dept: CALL CENTER | Facility: HOSPITAL | Age: 48
End: 2023-09-19
Payer: MEDICAID

## 2023-09-19 ENCOUNTER — TRANSITIONAL CARE MANAGEMENT TELEPHONE ENCOUNTER (OUTPATIENT)
Dept: CALL CENTER | Facility: HOSPITAL | Age: 48
End: 2023-09-19
Payer: MEDICAID

## 2023-09-19 ENCOUNTER — TELEPHONE (OUTPATIENT)
Dept: PEDIATRICS | Facility: OTHER | Age: 48
End: 2023-09-19

## 2023-09-19 NOTE — OUTREACH NOTE
Call Center TCM Note      Flowsheet Row Responses   [facility] patient discharged from? Non-  []   Does the patient have one of the following disease processes/diagnoses(primary or secondary)? Other   TCM attempt successful? No   Unsuccessful attempts Attempt 1            Huma Barger RN    9/19/2023, 13:56 EDT

## 2023-09-19 NOTE — OUTREACH NOTE
Call Center TCM Note      Flowsheet Row Responses   [facility] patient discharged from? Non-  []   Does the patient have one of the following disease processes/diagnoses(primary or secondary)? Other   TCM attempt successful? No   Unsuccessful attempts Attempt 2            Huma Barger RN    9/19/2023, 15:57 EDT

## 2023-09-19 NOTE — OUTREACH NOTE
Prep Survey      Flowsheet Row Responses   [facility] facility patient discharged from? Non-BH   Is LACE score < 7 ? Non-BH Discharge   Eligibility [facility]   Date of Admission 09/11/23   Date of Discharge 09/15/23   Discharge diagnosis left leg redness/swelling   Does the patient have one of the following disease processes/diagnoses(primary or secondary)? Other   Prep survey completed? Yes            Gina CAMP - Registered Nurse

## 2023-09-19 NOTE — TELEPHONE ENCOUNTER
Caller: Shine Reed Jr.    Relationship to patient: Self    Best call back number: [phone]     New or established patient?  [x] New  [] Established    Date of discharge:09/15/2023    Facility discharged from: [facility]     Diagnosis/Symptoms: LEFT LEG REDNESS AND SWELLING (REPORTS HE HAS HAD CIRRHOSIS FOR  FEW YEARS)     Length of stay (If applicable): 09/11/2023 - 09/15/2023    Specialty Only: Did you see a [facility] health provider?    [] Yes  [x] No  If so, who? N/A

## 2023-09-20 ENCOUNTER — TRANSITIONAL CARE MANAGEMENT TELEPHONE ENCOUNTER (OUTPATIENT)
Dept: CALL CENTER | Facility: HOSPITAL | Age: 48
End: 2023-09-20
Payer: MEDICAID

## 2023-09-20 NOTE — OUTREACH NOTE
Call Center TCM Note      Flowsheet Row Responses   [facility] patient discharged from? Non-  []   Does the patient have one of the following disease processes/diagnoses(primary or secondary)? Other   TCM attempt successful? No   Unsuccessful attempts Attempt 3   Revoked Reason Other  [Patient was a no show for his new patient appt with Tiffanie Olivera, MANNY]            Huma Barger RN    9/20/2023, 17:54 EDT

## 2023-11-09 ENCOUNTER — TELEPHONE (OUTPATIENT)
Dept: FAMILY MEDICINE CLINIC | Facility: CLINIC | Age: 48
End: 2023-11-09

## 2023-11-09 NOTE — TELEPHONE ENCOUNTER
Patient called and stated that he over slept and did not make it to his appointment. He states that he feels awful, he has cellulitis on legs, DM and cirrhosis  of the liver  and a lesion on lower leg that is not healing and has had multiple surgeries on it. I advised the patient to go to the ER and he states that he did not because he didn't want to be admitted to the hospital due to his brothers  on Saturday. I did make patient an appointment on Monday bur strongly encouraged him to go to the hospital.

## 2023-11-10 ENCOUNTER — TELEPHONE (OUTPATIENT)
Dept: FAMILY MEDICINE CLINIC | Facility: CLINIC | Age: 48
End: 2023-11-10

## 2023-12-05 ENCOUNTER — OUTSIDE FACILITY SERVICE (OUTPATIENT)
Dept: CARDIOLOGY | Facility: CLINIC | Age: 48
End: 2023-12-05
Payer: MEDICARE

## 2023-12-08 ENCOUNTER — HOSPITAL ENCOUNTER (EMERGENCY)
Age: 48
Discharge: HOME | End: 2023-12-08
Payer: MEDICARE

## 2023-12-08 VITALS
OXYGEN SATURATION: 95 % | RESPIRATION RATE: 20 BRPM | DIASTOLIC BLOOD PRESSURE: 96 MMHG | TEMPERATURE: 98.06 F | HEART RATE: 87 BPM | BODY MASS INDEX: 35.4 KG/M2 | SYSTOLIC BLOOD PRESSURE: 154 MMHG

## 2023-12-08 VITALS
HEART RATE: 87 BPM | DIASTOLIC BLOOD PRESSURE: 78 MMHG | OXYGEN SATURATION: 98 % | SYSTOLIC BLOOD PRESSURE: 164 MMHG | RESPIRATION RATE: 18 BRPM | TEMPERATURE: 98 F

## 2023-12-08 DIAGNOSIS — K74.60: ICD-10-CM

## 2023-12-08 DIAGNOSIS — R32: Primary | ICD-10-CM

## 2023-12-08 DIAGNOSIS — I11.0: ICD-10-CM

## 2023-12-08 DIAGNOSIS — J44.9: ICD-10-CM

## 2023-12-08 DIAGNOSIS — I48.91: ICD-10-CM

## 2023-12-08 DIAGNOSIS — I50.20: ICD-10-CM

## 2023-12-08 LAB
ALBUMIN LEVEL: 2.2 G/DL (ref 3.5–5)
ALBUMIN/GLOB SERPL: 0.4 {RATIO} (ref 1.1–1.8)
ALP ISO SERPL-ACNC: 287 U/L (ref 38–126)
ALT SERPLBLD-CCNC: 58 U/L (ref 12–78)
ANION GAP SERPL CALC-SCNC: 1.7 MEQ/L (ref 5–15)
AST SERPL QL: 84 U/L (ref 17–59)
BILIRUBIN,TOTAL: 1.4 MG/DL (ref 0.2–1.3)
BUN SERPL-MCNC: 12 MG/DL (ref 9–20)
CALCIUM SPEC-MCNC: 7.5 MG/DL (ref 8.4–10.2)
CHLORIDE SPEC-SCNC: 100 MMOL/L (ref 98–107)
CO2 SERPL-SCNC: 39 MMOL/L (ref 22–30)
CREAT BLD-SCNC: 0.6 MG/DL (ref 0.66–1.25)
CREATININE CLEARANCE ESTIMATED: 227 ML/MIN (ref 50–200)
ESTIMATED GLOMERULAR FILT RATE: 143 ML/MIN (ref 60–?)
GFR (AFRICAN AMERICAN): 173 ML/MIN (ref 60–?)
GLOBULIN SER CALC-MCNC: 5.3 G/DL (ref 1.3–3.2)
GLUCOSE: 122 MG/DL (ref 74–100)
HCT VFR BLD CALC: 35.5 % (ref 42–52)
HGB BLD-MCNC: 11.2 G/DL (ref 14.1–18)
MCHC RBC-ENTMCNC: 31.6 G/DL (ref 31.8–35.4)
MCV RBC: 98.2 FL (ref 80–94)
MEAN CORPUSCULAR HEMOGLOBIN: 31 PG (ref 27–31.2)
PLATELET # BLD: 117 K/MM3 (ref 142–424)
POTASSIUM: 3.7 MMOL/L (ref 3.5–5.1)
PROT SERPL-MCNC: 7.5 G/DL (ref 6.3–8.2)
RBC # BLD AUTO: 3.61 M/MM3 (ref 4.6–6.2)
SODIUM SPEC-SCNC: 137 MMOL/L (ref 136–145)
WBC # BLD AUTO: 8.8 K/MM3 (ref 4.8–10.8)

## 2023-12-08 PROCEDURE — 99283 EMERGENCY DEPT VISIT LOW MDM: CPT

## 2023-12-08 PROCEDURE — 80053 COMPREHEN METABOLIC PANEL: CPT

## 2023-12-08 PROCEDURE — 36415 COLL VENOUS BLD VENIPUNCTURE: CPT

## 2023-12-08 PROCEDURE — 85025 COMPLETE CBC W/AUTO DIFF WBC: CPT

## 2024-01-24 ENCOUNTER — READMISSION MANAGEMENT (OUTPATIENT)
Dept: CALL CENTER | Facility: HOSPITAL | Age: 49
End: 2024-01-24
Payer: MEDICARE

## 2024-01-24 NOTE — OUTREACH NOTE
Prep Survey      Flowsheet Row Responses   [facility] facility patient discharged from? Non-BH   Is LACE score < 7 ? Non-BH Discharge   Eligibility [facility]   Date of Admission 01/18/24   Date of Discharge 01/23/24   Discharge Disposition Home or Self Care   Discharge diagnosis SOB (shortness of breath)   Does the patient have one of the following disease processes/diagnoses(primary or secondary)? Other   Prep survey completed? Yes            Gina Díaz Registered Nurse

## 2024-01-25 ENCOUNTER — TRANSITIONAL CARE MANAGEMENT TELEPHONE ENCOUNTER (OUTPATIENT)
Dept: CALL CENTER | Facility: HOSPITAL | Age: 49
End: 2024-01-25
Payer: MEDICARE

## 2024-01-25 NOTE — OUTREACH NOTE
Call Center TCM Note      Flowsheet Row Responses   [facility] patient discharged from? Non-BH   Does the patient have one of the following disease processes/diagnoses(primary or secondary)? Other   TCM attempt successful? No   Unsuccessful attempts Attempt 2   Call Status Voice mail issues            Venita Valerio RN    1/25/2024, 14:40 EST

## 2024-01-25 NOTE — OUTREACH NOTE
Call Center TCM Note      Flowsheet Row Responses   [facility] patient discharged from? Non-  []   Does the patient have one of the following disease processes/diagnoses(primary or secondary)? Other   TCM attempt successful? No   Unsuccessful attempts Attempt 1   Does the patient have an appointment with their PCP within 7-14 days of discharge? No appointments available            Lorenza Larsen RN    1/25/2024, 10:21 EST

## 2024-01-26 ENCOUNTER — TRANSITIONAL CARE MANAGEMENT TELEPHONE ENCOUNTER (OUTPATIENT)
Dept: CALL CENTER | Facility: HOSPITAL | Age: 49
End: 2024-01-26
Payer: MEDICARE

## 2024-01-26 NOTE — OUTREACH NOTE
Call Center TCM Note      Flowsheet Row Responses   [facility] patient discharged from? Non-BH   Does the patient have one of the following disease processes/diagnoses(primary or secondary)? Other   TCM attempt successful? No   Unsuccessful attempts Attempt 3   Call Status Voice mail issues            Venita Valerio RN    1/26/2024, 08:47 EST

## 2024-04-15 ENCOUNTER — HOSPITAL ENCOUNTER (OUTPATIENT)
Dept: HOSPITAL 22 - ER | Age: 49
Setting detail: OBSERVATION
LOS: 1 days | Discharge: HOME | End: 2024-04-16
Payer: MEDICARE

## 2024-04-15 VITALS
HEART RATE: 109 BPM | RESPIRATION RATE: 15 BRPM | SYSTOLIC BLOOD PRESSURE: 124 MMHG | OXYGEN SATURATION: 100 % | DIASTOLIC BLOOD PRESSURE: 97 MMHG

## 2024-04-15 VITALS
DIASTOLIC BLOOD PRESSURE: 94 MMHG | OXYGEN SATURATION: 100 % | SYSTOLIC BLOOD PRESSURE: 137 MMHG | RESPIRATION RATE: 17 BRPM | HEART RATE: 104 BPM

## 2024-04-15 VITALS
DIASTOLIC BLOOD PRESSURE: 96 MMHG | RESPIRATION RATE: 17 BRPM | HEART RATE: 112 BPM | OXYGEN SATURATION: 100 % | SYSTOLIC BLOOD PRESSURE: 149 MMHG

## 2024-04-15 VITALS
DIASTOLIC BLOOD PRESSURE: 88 MMHG | TEMPERATURE: 99.86 F | HEART RATE: 101 BPM | SYSTOLIC BLOOD PRESSURE: 147 MMHG | OXYGEN SATURATION: 100 % | RESPIRATION RATE: 24 BRPM

## 2024-04-15 VITALS
DIASTOLIC BLOOD PRESSURE: 84 MMHG | HEART RATE: 102 BPM | OXYGEN SATURATION: 100 % | RESPIRATION RATE: 17 BRPM | SYSTOLIC BLOOD PRESSURE: 131 MMHG

## 2024-04-15 VITALS
HEART RATE: 98 BPM | SYSTOLIC BLOOD PRESSURE: 135 MMHG | DIASTOLIC BLOOD PRESSURE: 95 MMHG | OXYGEN SATURATION: 99 % | RESPIRATION RATE: 25 BRPM

## 2024-04-15 VITALS
DIASTOLIC BLOOD PRESSURE: 97 MMHG | BODY MASS INDEX: 36.3 KG/M2 | HEART RATE: 66 BPM | OXYGEN SATURATION: 96 % | TEMPERATURE: 98.3 F | SYSTOLIC BLOOD PRESSURE: 160 MMHG | RESPIRATION RATE: 18 BRPM | BODY MASS INDEX: 41.5 KG/M2

## 2024-04-15 VITALS
DIASTOLIC BLOOD PRESSURE: 87 MMHG | HEART RATE: 104 BPM | OXYGEN SATURATION: 98 % | SYSTOLIC BLOOD PRESSURE: 140 MMHG | TEMPERATURE: 98.1 F | RESPIRATION RATE: 24 BRPM

## 2024-04-15 VITALS — DIASTOLIC BLOOD PRESSURE: 87 MMHG | SYSTOLIC BLOOD PRESSURE: 123 MMHG | HEART RATE: 66 BPM | OXYGEN SATURATION: 100 %

## 2024-04-15 VITALS
RESPIRATION RATE: 21 BRPM | DIASTOLIC BLOOD PRESSURE: 88 MMHG | OXYGEN SATURATION: 99 % | SYSTOLIC BLOOD PRESSURE: 125 MMHG | HEART RATE: 91 BPM

## 2024-04-15 VITALS — OXYGEN SATURATION: 100 % | DIASTOLIC BLOOD PRESSURE: 91 MMHG | SYSTOLIC BLOOD PRESSURE: 139 MMHG | HEART RATE: 66 BPM

## 2024-04-15 VITALS — OXYGEN SATURATION: 100 % | HEART RATE: 116 BPM | DIASTOLIC BLOOD PRESSURE: 98 MMHG | SYSTOLIC BLOOD PRESSURE: 139 MMHG

## 2024-04-15 VITALS
DIASTOLIC BLOOD PRESSURE: 79 MMHG | SYSTOLIC BLOOD PRESSURE: 119 MMHG | HEART RATE: 101 BPM | OXYGEN SATURATION: 98 % | RESPIRATION RATE: 17 BRPM

## 2024-04-15 VITALS — DIASTOLIC BLOOD PRESSURE: 86 MMHG | SYSTOLIC BLOOD PRESSURE: 134 MMHG

## 2024-04-15 VITALS — HEART RATE: 96 BPM

## 2024-04-15 VITALS — HEART RATE: 91 BPM

## 2024-04-15 VITALS — HEART RATE: 100 BPM

## 2024-04-15 DIAGNOSIS — J44.9: ICD-10-CM

## 2024-04-15 DIAGNOSIS — G93.41: Primary | ICD-10-CM

## 2024-04-15 DIAGNOSIS — I83.018: ICD-10-CM

## 2024-04-15 DIAGNOSIS — R18.8: ICD-10-CM

## 2024-04-15 DIAGNOSIS — K74.69: ICD-10-CM

## 2024-04-15 DIAGNOSIS — I50.22: ICD-10-CM

## 2024-04-15 DIAGNOSIS — Z79.899: ICD-10-CM

## 2024-04-15 DIAGNOSIS — I11.0: ICD-10-CM

## 2024-04-15 DIAGNOSIS — I27.20: ICD-10-CM

## 2024-04-15 DIAGNOSIS — Z87.891: ICD-10-CM

## 2024-04-15 DIAGNOSIS — I48.91: ICD-10-CM

## 2024-04-15 DIAGNOSIS — I83.028: ICD-10-CM

## 2024-04-15 LAB
ALBUMIN LEVEL: 2.6 G/DL (ref 3.5–5)
ALBUMIN/GLOB SERPL: 0.5 {RATIO} (ref 1.1–1.8)
ALP ISO SERPL-ACNC: 253 U/L (ref 38–126)
ALT SERPLBLD-CCNC: 35 U/L (ref 12–78)
AMMONIA: 62 UMOL/L (ref 9–30)
ANION GAP SERPL CALC-SCNC: 2.6 MEQ/L (ref 5–15)
APTT PPP: 29.1 SECONDS (ref 22.8–30.6)
AST SERPL QL: 65 U/L (ref 17–59)
BILIRUBIN,TOTAL: 2.7 MG/DL (ref 0.2–1.3)
BUN SERPL-MCNC: 10 MG/DL (ref 9–20)
CALCIUM SPEC-MCNC: 8.7 MG/DL (ref 8.4–10.2)
CHLAMYDOPHILA PNEUMONIAE, PCR: (no result)
CHLORIDE SPEC-SCNC: 112 MMOL/L (ref 98–107)
CO2 SERPL-SCNC: 29 MMOL/L (ref 22–30)
COLOR UR: YELLOW
CREATININE CLEARANCE ESTIMATED: 128 ML/MIN (ref 50–200)
CREATININE,SERUM: 0.6 MG/DL (ref 0.66–1.25)
ESTIMATED GLOMERULAR FILT RATE: 143 ML/MIN (ref 60–?)
GFR (AFRICAN AMERICAN): 173 ML/MIN (ref 60–?)
GLOBULIN SER CALC-MCNC: 5.6 G/DL (ref 1.3–3.2)
GLUCOSE: 115 MG/DL (ref 74–100)
HCT VFR BLD CALC: 34.7 % (ref 42–52)
HGB BLD-MCNC: 10.7 G/DL (ref 14.1–18)
INR PPP: 1.4 (ref 0.9–1.1)
LIPASE: 44 U/L (ref 23–300)
MCHC RBC-ENTMCNC: 30.8 G/DL (ref 31.8–35.4)
MCV RBC: 99.8 FL (ref 80–94)
MEAN CORPUSCULAR HEMOGLOBIN: 30.7 PG (ref 27–31.2)
MICRO URNS: (no result)
MYCOPLASMA PNEUMONIAE, PCR: (no result)
NT PRO BRAIN NATRIURETIC PEP.: 685 PG/ML (ref 0–125)
PH UR: 8 [PH] (ref 5–8.5)
PLATELET # BLD: 181 K/MM3 (ref 142–424)
POTASSIUM: 3.6 MMOL/L (ref 3.5–5.1)
PROT SERPL-MCNC: 8.2 G/DL (ref 6.3–8.2)
PT BLD: 14.8 SECONDS (ref 10.1–12.5)
RBC # BLD AUTO: 3.48 M/MM3 (ref 4.6–6.2)
SODIUM SPEC-SCNC: 140 MMOL/L (ref 136–145)
SP GR UR: 1.01 (ref 1–1.03)
TROPONIN I: 0.02 NG/ML (ref 0–0.03)
TROPONIN I: 0.03 NG/ML (ref 0–0.03)
TROPONIN I: 0.04 NG/ML (ref 0–0.03)
UROBILINOGEN UR QL: 0.2 EU/DL
WBC # BLD AUTO: 4.3 K/MM3 (ref 4.8–10.8)

## 2024-04-15 PROCEDURE — 74177 CT ABD & PELVIS W/CONTRAST: CPT

## 2024-04-15 PROCEDURE — G0378 HOSPITAL OBSERVATION PER HR: HCPCS

## 2024-04-15 PROCEDURE — 99291 CRITICAL CARE FIRST HOUR: CPT

## 2024-04-15 PROCEDURE — 80053 COMPREHEN METABOLIC PANEL: CPT

## 2024-04-15 PROCEDURE — 85730 THROMBOPLASTIN TIME PARTIAL: CPT

## 2024-04-15 PROCEDURE — 82140 ASSAY OF AMMONIA: CPT

## 2024-04-15 PROCEDURE — 83690 ASSAY OF LIPASE: CPT

## 2024-04-15 PROCEDURE — 87632 RESP VIRUS 6-11 TARGETS: CPT

## 2024-04-15 PROCEDURE — 83880 ASSAY OF NATRIURETIC PEPTIDE: CPT

## 2024-04-15 PROCEDURE — 87081 CULTURE SCREEN ONLY: CPT

## 2024-04-15 PROCEDURE — 70496 CT ANGIOGRAPHY HEAD: CPT

## 2024-04-15 PROCEDURE — 81001 URINALYSIS AUTO W/SCOPE: CPT

## 2024-04-15 PROCEDURE — 71045 X-RAY EXAM CHEST 1 VIEW: CPT

## 2024-04-15 PROCEDURE — 85610 PROTHROMBIN TIME: CPT

## 2024-04-15 PROCEDURE — 84484 ASSAY OF TROPONIN QUANT: CPT

## 2024-04-15 PROCEDURE — 83735 ASSAY OF MAGNESIUM: CPT

## 2024-04-15 PROCEDURE — 87635 SARS-COV-2 COVID-19 AMP PRB: CPT

## 2024-04-15 PROCEDURE — 83605 ASSAY OF LACTIC ACID: CPT

## 2024-04-15 PROCEDURE — 94640 AIRWAY INHALATION TREATMENT: CPT

## 2024-04-15 PROCEDURE — 36415 COLL VENOUS BLD VENIPUNCTURE: CPT

## 2024-04-15 PROCEDURE — 85025 COMPLETE CBC W/AUTO DIFF WBC: CPT

## 2024-04-15 PROCEDURE — 93306 TTE W/DOPPLER COMPLETE: CPT

## 2024-04-15 PROCEDURE — 93005 ELECTROCARDIOGRAM TRACING: CPT

## 2024-04-15 PROCEDURE — 70498 CT ANGIOGRAPHY NECK: CPT

## 2024-04-15 PROCEDURE — 70450 CT HEAD/BRAIN W/O DYE: CPT

## 2024-04-15 RX ADMIN — APIXABAN 2.5 MG: 5 TABLET, FILM COATED ORAL at 21:05

## 2024-04-15 RX ADMIN — METOPROLOL TARTRATE 12.5 MG: 25 TABLET, FILM COATED ORAL at 20:09

## 2024-04-15 RX ADMIN — IPRATROPIUM BROMIDE AND ALBUTEROL SULFATE 3 ML: .5; 3 SOLUTION RESPIRATORY (INHALATION) at 19:33

## 2024-04-15 RX ADMIN — METOPROLOL TARTRATE 5 MG: 5 INJECTION, SOLUTION INTRAVENOUS at 17:37

## 2024-04-15 RX ADMIN — BUMETANIDE 1 MG: 0.25 INJECTION INTRAMUSCULAR; INTRAVENOUS at 20:08

## 2024-04-15 RX ADMIN — SODIUM CHLORIDE 50 ML: 9 INJECTION, SOLUTION INTRAMUSCULAR; INTRAVENOUS; SUBCUTANEOUS at 13:44

## 2024-04-15 RX ADMIN — IOPAMIDOL 165 ML: 755 INJECTION, SOLUTION INTRAVENOUS at 13:44

## 2024-04-15 RX ADMIN — IPRATROPIUM BROMIDE AND ALBUTEROL SULFATE 3 ML: .5; 3 SOLUTION RESPIRATORY (INHALATION) at 23:05

## 2024-04-15 RX ADMIN — METOPROLOL TARTRATE 5 MG: 5 INJECTION, SOLUTION INTRAVENOUS at 16:14

## 2024-04-15 RX ADMIN — IPRATROPIUM BROMIDE AND ALBUTEROL SULFATE 9 ML: .5; 3 SOLUTION RESPIRATORY (INHALATION) at 13:08

## 2024-04-16 VITALS
RESPIRATION RATE: 18 BRPM | OXYGEN SATURATION: 99 % | DIASTOLIC BLOOD PRESSURE: 74 MMHG | TEMPERATURE: 98.8 F | SYSTOLIC BLOOD PRESSURE: 154 MMHG | HEART RATE: 67 BPM

## 2024-04-16 VITALS
RESPIRATION RATE: 20 BRPM | DIASTOLIC BLOOD PRESSURE: 89 MMHG | HEART RATE: 76 BPM | TEMPERATURE: 98.78 F | SYSTOLIC BLOOD PRESSURE: 152 MMHG | OXYGEN SATURATION: 100 %

## 2024-04-16 VITALS — HEART RATE: 75 BPM

## 2024-04-16 VITALS
DIASTOLIC BLOOD PRESSURE: 93 MMHG | HEART RATE: 74 BPM | OXYGEN SATURATION: 97 % | RESPIRATION RATE: 18 BRPM | TEMPERATURE: 98.24 F | SYSTOLIC BLOOD PRESSURE: 180 MMHG

## 2024-04-16 VITALS — HEART RATE: 70 BPM

## 2024-04-16 VITALS — HEART RATE: 82 BPM

## 2024-04-16 VITALS
TEMPERATURE: 98.78 F | HEART RATE: 82 BPM | OXYGEN SATURATION: 95 % | SYSTOLIC BLOOD PRESSURE: 144 MMHG | DIASTOLIC BLOOD PRESSURE: 86 MMHG | RESPIRATION RATE: 24 BRPM

## 2024-04-16 LAB
ALBUMIN LEVEL: 2.3 G/DL (ref 3.5–5)
ALBUMIN/GLOB SERPL: 0.5 {RATIO} (ref 1.1–1.8)
ALP ISO SERPL-ACNC: 224 U/L (ref 38–126)
ALT SERPLBLD-CCNC: 27 U/L (ref 12–78)
ANION GAP SERPL CALC-SCNC: 1.6 MEQ/L (ref 5–15)
AST SERPL QL: 55 U/L (ref 17–59)
BILIRUBIN,TOTAL: 1.8 MG/DL (ref 0.2–1.3)
BUN SERPL-MCNC: 10 MG/DL (ref 9–20)
CALCIUM SPEC-MCNC: 8 MG/DL (ref 8.4–10.2)
CHLORIDE SPEC-SCNC: 114 MMOL/L (ref 98–107)
CO2 SERPL-SCNC: 26 MMOL/L (ref 22–30)
CREATININE CLEARANCE ESTIMATED: 232 ML/MIN (ref 50–200)
CREATININE,SERUM: 0.6 MG/DL (ref 0.66–1.25)
ESTIMATED GLOMERULAR FILT RATE: 143 ML/MIN (ref 60–?)
GFR (AFRICAN AMERICAN): 173 ML/MIN (ref 60–?)
GLOBULIN SER CALC-MCNC: 5 G/DL (ref 1.3–3.2)
GLUCOSE: 98 MG/DL (ref 74–100)
HCT VFR BLD CALC: 30.8 % (ref 42–52)
HGB BLD-MCNC: 9.4 G/DL (ref 14.1–18)
INR PPP: 1.44 (ref 0.9–1.1)
MAGNESIUM: 1.6 MG/DL (ref 1.6–2.3)
MCHC RBC-ENTMCNC: 30.5 G/DL (ref 31.8–35.4)
MCV RBC: 98.6 FL (ref 80–94)
MEAN CORPUSCULAR HEMOGLOBIN: 30.1 PG (ref 27–31.2)
PLATELET # BLD: 163 K/MM3 (ref 142–424)
POTASSIUM: 3.6 MMOL/L (ref 3.5–5.1)
PROT SERPL-MCNC: 7.3 G/DL (ref 6.3–8.2)
PT BLD: 15.2 SECONDS (ref 10.1–12.5)
RBC # BLD AUTO: 3.13 M/MM3 (ref 4.6–6.2)
SODIUM SPEC-SCNC: 138 MMOL/L (ref 136–145)
WBC # BLD AUTO: 3.9 K/MM3 (ref 4.8–10.8)

## 2024-04-16 RX ADMIN — IPRATROPIUM BROMIDE AND ALBUTEROL SULFATE 3 ML: .5; 3 SOLUTION RESPIRATORY (INHALATION) at 06:10

## 2024-04-16 RX ADMIN — BUMETANIDE 1 MG: 0.25 INJECTION INTRAMUSCULAR; INTRAVENOUS at 08:45

## 2024-04-16 RX ADMIN — METOPROLOL TARTRATE 25 MG: 25 TABLET, FILM COATED ORAL at 08:45

## 2024-04-16 RX ADMIN — IPRATROPIUM BROMIDE AND ALBUTEROL SULFATE 3 ML: .5; 3 SOLUTION RESPIRATORY (INHALATION) at 13:33

## 2024-04-16 RX ADMIN — MORPHINE SULFATE 2 MG: 2 INJECTION, SOLUTION INTRAMUSCULAR; INTRAVENOUS at 05:39
